# Patient Record
Sex: FEMALE | Race: BLACK OR AFRICAN AMERICAN | NOT HISPANIC OR LATINO | Employment: OTHER | URBAN - METROPOLITAN AREA
[De-identification: names, ages, dates, MRNs, and addresses within clinical notes are randomized per-mention and may not be internally consistent; named-entity substitution may affect disease eponyms.]

---

## 2017-10-26 ENCOUNTER — CONVERSION ENCOUNTER (OUTPATIENT)
Dept: GENERAL RADIOLOGY | Facility: HOSPITAL | Age: 67
End: 2017-10-26

## 2018-02-06 ENCOUNTER — OFFICE VISIT CONVERTED (OUTPATIENT)
Dept: INTERNAL MEDICINE | Facility: CLINIC | Age: 68
End: 2018-02-06
Attending: INTERNAL MEDICINE

## 2018-02-06 ENCOUNTER — CONVERSION ENCOUNTER (OUTPATIENT)
Dept: INTERNAL MEDICINE | Facility: CLINIC | Age: 68
End: 2018-02-06

## 2018-04-17 ENCOUNTER — OFFICE VISIT CONVERTED (OUTPATIENT)
Dept: GASTROENTEROLOGY | Facility: CLINIC | Age: 68
End: 2018-04-17
Attending: NURSE PRACTITIONER

## 2018-06-18 ENCOUNTER — CONVERSION ENCOUNTER (OUTPATIENT)
Dept: INTERNAL MEDICINE | Facility: CLINIC | Age: 68
End: 2018-06-18

## 2018-06-18 ENCOUNTER — OFFICE VISIT CONVERTED (OUTPATIENT)
Dept: INTERNAL MEDICINE | Facility: CLINIC | Age: 68
End: 2018-06-18
Attending: INTERNAL MEDICINE

## 2018-07-23 ENCOUNTER — OFFICE VISIT CONVERTED (OUTPATIENT)
Dept: GASTROENTEROLOGY | Facility: CLINIC | Age: 68
End: 2018-07-23
Attending: INTERNAL MEDICINE

## 2018-09-10 ENCOUNTER — OFFICE VISIT CONVERTED (OUTPATIENT)
Dept: INTERNAL MEDICINE | Facility: CLINIC | Age: 68
End: 2018-09-10
Attending: INTERNAL MEDICINE

## 2018-11-16 ENCOUNTER — CONVERSION ENCOUNTER (OUTPATIENT)
Dept: GENERAL RADIOLOGY | Facility: HOSPITAL | Age: 68
End: 2018-11-16

## 2018-12-10 ENCOUNTER — OFFICE VISIT CONVERTED (OUTPATIENT)
Dept: GASTROENTEROLOGY | Facility: CLINIC | Age: 68
End: 2018-12-10
Attending: INTERNAL MEDICINE

## 2019-01-01 ENCOUNTER — HOSPITAL ENCOUNTER (OUTPATIENT)
Dept: URGENT CARE | Facility: CLINIC | Age: 69
Discharge: HOME | End: 2019-01-01
Attending: FAMILY MEDICINE

## 2019-01-07 ENCOUNTER — CONVERSION ENCOUNTER (OUTPATIENT)
Dept: INTERNAL MEDICINE | Facility: CLINIC | Age: 69
End: 2019-01-07

## 2019-01-07 ENCOUNTER — OFFICE VISIT CONVERTED (OUTPATIENT)
Dept: INTERNAL MEDICINE | Facility: CLINIC | Age: 69
End: 2019-01-07
Attending: INTERNAL MEDICINE

## 2019-02-08 ENCOUNTER — HOSPITAL ENCOUNTER (OUTPATIENT)
Dept: URGENT CARE | Facility: CLINIC | Age: 69
Discharge: HOME | End: 2019-02-08

## 2019-02-12 ENCOUNTER — OFFICE VISIT CONVERTED (OUTPATIENT)
Dept: INTERNAL MEDICINE | Facility: CLINIC | Age: 69
End: 2019-02-12
Attending: PHYSICIAN ASSISTANT

## 2019-02-12 ENCOUNTER — CONVERSION ENCOUNTER (OUTPATIENT)
Dept: INTERNAL MEDICINE | Facility: CLINIC | Age: 69
End: 2019-02-12

## 2019-02-12 ENCOUNTER — HOSPITAL ENCOUNTER (OUTPATIENT)
Dept: OTHER | Facility: HOSPITAL | Age: 69
Discharge: HOME | End: 2019-02-12
Attending: PHYSICIAN ASSISTANT

## 2019-02-14 LAB — BACTERIA UR CULT: NORMAL

## 2019-03-18 ENCOUNTER — OFFICE VISIT CONVERTED (OUTPATIENT)
Dept: GASTROENTEROLOGY | Facility: CLINIC | Age: 69
End: 2019-03-18
Attending: NURSE PRACTITIONER

## 2019-03-20 ENCOUNTER — CONVERSION ENCOUNTER (OUTPATIENT)
Dept: INTERNAL MEDICINE | Facility: CLINIC | Age: 69
End: 2019-03-20

## 2019-03-22 ENCOUNTER — CONVERSION ENCOUNTER (OUTPATIENT)
Dept: INTERNAL MEDICINE | Facility: CLINIC | Age: 69
End: 2019-03-22

## 2019-04-17 ENCOUNTER — HOSPITAL ENCOUNTER (OUTPATIENT)
Dept: GASTROENTEROLOGY | Facility: HOSPITAL | Age: 69
Setting detail: HOSPITAL OUTPATIENT SURGERY
Discharge: HOME | End: 2019-04-17
Attending: INTERNAL MEDICINE

## 2019-04-26 ENCOUNTER — HOSPITAL ENCOUNTER (OUTPATIENT)
Dept: OTHER | Facility: HOSPITAL | Age: 69
Discharge: HOME | End: 2019-04-26
Attending: PHYSICIAN ASSISTANT

## 2019-04-26 ENCOUNTER — CONVERSION ENCOUNTER (OUTPATIENT)
Dept: INTERNAL MEDICINE | Facility: CLINIC | Age: 69
End: 2019-04-26

## 2019-04-26 ENCOUNTER — OFFICE VISIT CONVERTED (OUTPATIENT)
Dept: INTERNAL MEDICINE | Facility: CLINIC | Age: 69
End: 2019-04-26
Attending: PHYSICIAN ASSISTANT

## 2019-04-26 LAB
EST. AVERAGE GLUCOSE BLD GHB EST-MCNC: 120 MG/DL
HBA1C MFR BLD: 5.8 % (ref 3.5–5.7)
T4 FREE SERPL-MCNC: 1.2 NG/DL (ref 0.9–1.8)
TSH SERPL-ACNC: 1.46 M[IU]/L (ref 0.27–4.2)

## 2019-05-30 ENCOUNTER — HOSPITAL ENCOUNTER (OUTPATIENT)
Dept: NUCLEAR MEDICINE | Facility: HOSPITAL | Age: 69
Discharge: HOME | End: 2019-05-30
Attending: SPECIALIST

## 2019-08-20 ENCOUNTER — CONVERSION ENCOUNTER (OUTPATIENT)
Dept: OTHER | Facility: HOSPITAL | Age: 69
End: 2019-08-20

## 2019-08-20 ENCOUNTER — HOSPITAL ENCOUNTER (OUTPATIENT)
Dept: OTHER | Facility: HOSPITAL | Age: 69
Discharge: HOME | End: 2019-08-20
Attending: PHYSICIAN ASSISTANT

## 2019-08-20 ENCOUNTER — OFFICE VISIT CONVERTED (OUTPATIENT)
Dept: INTERNAL MEDICINE | Facility: CLINIC | Age: 69
End: 2019-08-20
Attending: PHYSICIAN ASSISTANT

## 2019-08-20 LAB
ALBUMIN SERPL-MCNC: 4.6 G/DL (ref 3.5–5)
ALBUMIN/GLOB SERPL: 1.4 {RATIO} (ref 1.4–2.6)
ALP SERPL-CCNC: 80 U/L (ref 43–160)
ALT SERPL-CCNC: 23 U/L (ref 10–40)
ANION GAP SERPL CALC-SCNC: 16 MMOL/L (ref 8–19)
AST SERPL-CCNC: 27 U/L (ref 15–50)
BASOPHILS # BLD AUTO: 0.06 10*3/UL (ref 0–0.2)
BASOPHILS NFR BLD AUTO: 1.1 % (ref 0–3)
BILIRUB SERPL-MCNC: 0.38 MG/DL (ref 0.2–1.3)
BUN SERPL-MCNC: 15 MG/DL (ref 5–25)
BUN/CREAT SERPL: 21 {RATIO} (ref 6–20)
CALCIUM SERPL-MCNC: 9.4 MG/DL (ref 8.7–10.4)
CHLORIDE SERPL-SCNC: 102 MMOL/L (ref 99–111)
CONV ABS IMM GRAN: 0.01 10*3/UL (ref 0–0.2)
CONV CO2: 28 MMOL/L (ref 22–32)
CONV IMMATURE GRAN: 0.2 % (ref 0–1.8)
CONV TOTAL PROTEIN: 7.9 G/DL (ref 6.3–8.2)
CREAT UR-MCNC: 0.72 MG/DL (ref 0.5–0.9)
DEPRECATED RDW RBC AUTO: 46.9 FL (ref 36.4–46.3)
EOSINOPHIL # BLD AUTO: 0.23 10*3/UL (ref 0–0.7)
EOSINOPHIL # BLD AUTO: 4.1 % (ref 0–7)
ERYTHROCYTE [DISTWIDTH] IN BLOOD BY AUTOMATED COUNT: 13.6 % (ref 11.7–14.4)
GFR SERPLBLD BASED ON 1.73 SQ M-ARVRAT: >60 ML/MIN/{1.73_M2}
GLOBULIN UR ELPH-MCNC: 3.3 G/DL (ref 2–3.5)
GLUCOSE SERPL-MCNC: 93 MG/DL (ref 65–99)
HCT VFR BLD AUTO: 41.3 % (ref 37–47)
HGB BLD-MCNC: 12.9 G/DL (ref 12–16)
LYMPHOCYTES # BLD AUTO: 2.9 10*3/UL (ref 1–5)
LYMPHOCYTES NFR BLD AUTO: 51.6 % (ref 20–45)
MAGNESIUM SERPL-MCNC: 2.1 MG/DL (ref 1.6–2.3)
MCH RBC QN AUTO: 28.9 PG (ref 27–31)
MCHC RBC AUTO-ENTMCNC: 31.2 G/DL (ref 33–37)
MCV RBC AUTO: 92.6 FL (ref 81–99)
MONOCYTES # BLD AUTO: 0.46 10*3/UL (ref 0.2–1.2)
MONOCYTES NFR BLD AUTO: 8.2 % (ref 3–10)
NEUTROPHILS # BLD AUTO: 1.96 10*3/UL (ref 2–8)
NEUTROPHILS NFR BLD AUTO: 34.8 % (ref 30–85)
NRBC CBCN: 0 % (ref 0–0.7)
OSMOLALITY SERPL CALC.SUM OF ELEC: 295 MOSM/KG (ref 273–304)
PLATELET # BLD AUTO: 251 10*3/UL (ref 130–400)
PMV BLD AUTO: 11.1 FL (ref 9.4–12.3)
POTASSIUM SERPL-SCNC: 3.9 MMOL/L (ref 3.5–5.3)
RBC # BLD AUTO: 4.46 10*6/UL (ref 4.2–5.4)
SODIUM SERPL-SCNC: 142 MMOL/L (ref 135–147)
TSH SERPL-ACNC: 1.36 M[IU]/L (ref 0.27–4.2)
WBC # BLD AUTO: 5.62 10*3/UL (ref 4.8–10.8)

## 2019-08-21 LAB
IRON SATN MFR SERPL: 27 % (ref 20–55)
IRON SERPL-MCNC: 107 UG/DL (ref 60–170)
TIBC SERPL-MCNC: 402 UG/DL (ref 245–450)
TRANSFERRIN SERPL-MCNC: 281 MG/DL (ref 250–380)

## 2019-09-18 ENCOUNTER — CONVERSION ENCOUNTER (OUTPATIENT)
Dept: GASTROENTEROLOGY | Facility: CLINIC | Age: 69
End: 2019-09-18

## 2019-09-18 ENCOUNTER — OFFICE VISIT CONVERTED (OUTPATIENT)
Dept: GASTROENTEROLOGY | Facility: CLINIC | Age: 69
End: 2019-09-18
Attending: NURSE PRACTITIONER

## 2019-10-15 ENCOUNTER — HOSPITAL ENCOUNTER (OUTPATIENT)
Dept: URGENT CARE | Facility: CLINIC | Age: 69
Discharge: HOME | End: 2019-10-15
Attending: EMERGENCY MEDICINE

## 2019-10-21 ENCOUNTER — OFFICE VISIT CONVERTED (OUTPATIENT)
Dept: INTERNAL MEDICINE | Facility: CLINIC | Age: 69
End: 2019-10-21
Attending: NURSE PRACTITIONER

## 2019-10-21 ENCOUNTER — CONVERSION ENCOUNTER (OUTPATIENT)
Dept: INTERNAL MEDICINE | Facility: CLINIC | Age: 69
End: 2019-10-21

## 2019-11-18 ENCOUNTER — HOSPITAL ENCOUNTER (OUTPATIENT)
Dept: GENERAL RADIOLOGY | Facility: HOSPITAL | Age: 69
Discharge: HOME | End: 2019-11-18
Attending: INTERNAL MEDICINE

## 2019-11-23 ENCOUNTER — HOSPITAL ENCOUNTER (OUTPATIENT)
Dept: URGENT CARE | Facility: CLINIC | Age: 69
Discharge: HOME | End: 2019-11-23
Attending: EMERGENCY MEDICINE

## 2020-01-13 ENCOUNTER — OFFICE VISIT CONVERTED (OUTPATIENT)
Dept: INTERNAL MEDICINE | Facility: CLINIC | Age: 70
End: 2020-01-13
Attending: INTERNAL MEDICINE

## 2020-01-14 ENCOUNTER — HOSPITAL ENCOUNTER (OUTPATIENT)
Dept: OTHER | Facility: HOSPITAL | Age: 70
Discharge: HOME | End: 2020-01-14
Attending: INTERNAL MEDICINE

## 2020-01-14 LAB
ALBUMIN SERPL-MCNC: 4.5 G/DL (ref 3.5–5)
ALBUMIN/GLOB SERPL: 1.3 {RATIO} (ref 1.4–2.6)
ALP SERPL-CCNC: 81 U/L (ref 43–160)
ALT SERPL-CCNC: 25 U/L (ref 10–40)
ANION GAP SERPL CALC-SCNC: 21 MMOL/L (ref 8–19)
AST SERPL-CCNC: 30 U/L (ref 15–50)
BASOPHILS # BLD AUTO: 0.05 10*3/UL (ref 0–0.2)
BASOPHILS NFR BLD AUTO: 0.8 % (ref 0–3)
BILIRUB SERPL-MCNC: 0.44 MG/DL (ref 0.2–1.3)
BUN SERPL-MCNC: 15 MG/DL (ref 5–25)
BUN/CREAT SERPL: 19 {RATIO} (ref 6–20)
CALCIUM SERPL-MCNC: 9.5 MG/DL (ref 8.7–10.4)
CHLORIDE SERPL-SCNC: 104 MMOL/L (ref 99–111)
CONV ABS IMM GRAN: 0.01 10*3/UL (ref 0–0.2)
CONV CO2: 25 MMOL/L (ref 22–32)
CONV IMMATURE GRAN: 0.2 % (ref 0–1.8)
CONV TOTAL PROTEIN: 7.9 G/DL (ref 6.3–8.2)
CREAT UR-MCNC: 0.81 MG/DL (ref 0.5–0.9)
DEPRECATED RDW RBC AUTO: 46.1 FL (ref 36.4–46.3)
EOSINOPHIL # BLD AUTO: 0.18 10*3/UL (ref 0–0.7)
EOSINOPHIL # BLD AUTO: 2.9 % (ref 0–7)
ERYTHROCYTE [DISTWIDTH] IN BLOOD BY AUTOMATED COUNT: 13.5 % (ref 11.7–14.4)
EST. AVERAGE GLUCOSE BLD GHB EST-MCNC: 123 MG/DL
GFR SERPLBLD BASED ON 1.73 SQ M-ARVRAT: >60 ML/MIN/{1.73_M2}
GLOBULIN UR ELPH-MCNC: 3.4 G/DL (ref 2–3.5)
GLUCOSE SERPL-MCNC: 106 MG/DL (ref 65–99)
HBA1C MFR BLD: 5.9 % (ref 3.5–5.7)
HCT VFR BLD AUTO: 42.1 % (ref 37–47)
HGB BLD-MCNC: 13.4 G/DL (ref 12–16)
LYMPHOCYTES # BLD AUTO: 3.63 10*3/UL (ref 1–5)
LYMPHOCYTES NFR BLD AUTO: 59 % (ref 20–45)
MCH RBC QN AUTO: 29.3 PG (ref 27–31)
MCHC RBC AUTO-ENTMCNC: 31.8 G/DL (ref 33–37)
MCV RBC AUTO: 92.1 FL (ref 81–99)
MONOCYTES # BLD AUTO: 0.44 10*3/UL (ref 0.2–1.2)
MONOCYTES NFR BLD AUTO: 7.2 % (ref 3–10)
NEUTROPHILS # BLD AUTO: 1.84 10*3/UL (ref 2–8)
NEUTROPHILS NFR BLD AUTO: 29.9 % (ref 30–85)
NRBC CBCN: 0 % (ref 0–0.7)
OSMOLALITY SERPL CALC.SUM OF ELEC: 301 MOSM/KG (ref 273–304)
PLATELET # BLD AUTO: 263 10*3/UL (ref 130–400)
PMV BLD AUTO: 10.6 FL (ref 9.4–12.3)
POTASSIUM SERPL-SCNC: 4.5 MMOL/L (ref 3.5–5.3)
RBC # BLD AUTO: 4.57 10*6/UL (ref 4.2–5.4)
SODIUM SERPL-SCNC: 145 MMOL/L (ref 135–147)
WBC # BLD AUTO: 6.15 10*3/UL (ref 4.8–10.8)

## 2020-01-17 ENCOUNTER — HOSPITAL ENCOUNTER (OUTPATIENT)
Dept: GENERAL RADIOLOGY | Facility: HOSPITAL | Age: 70
Discharge: HOME | End: 2020-01-17
Attending: INTERNAL MEDICINE

## 2020-02-07 ENCOUNTER — OFFICE VISIT CONVERTED (OUTPATIENT)
Dept: INTERNAL MEDICINE | Facility: CLINIC | Age: 70
End: 2020-02-07
Attending: INTERNAL MEDICINE

## 2020-04-07 ENCOUNTER — HOSPITAL ENCOUNTER (OUTPATIENT)
Dept: LAB | Facility: HOSPITAL | Age: 70
Discharge: HOME | End: 2020-04-07
Attending: SPECIALIST

## 2020-04-07 LAB
ALBUMIN SERPL-MCNC: 4.5 G/DL (ref 3.5–5)
ALBUMIN/GLOB SERPL: 1.5 {RATIO} (ref 1.4–2.6)
ALP SERPL-CCNC: 76 U/L (ref 43–160)
ALT SERPL-CCNC: 23 U/L (ref 10–40)
ANION GAP SERPL CALC-SCNC: 19 MMOL/L (ref 8–19)
AST SERPL-CCNC: 27 U/L (ref 15–50)
BILIRUB SERPL-MCNC: 0.41 MG/DL (ref 0.2–1.3)
BUN SERPL-MCNC: 12 MG/DL (ref 5–25)
BUN/CREAT SERPL: 13 {RATIO} (ref 6–20)
CALCIUM SERPL-MCNC: 9.5 MG/DL (ref 8.7–10.4)
CHLORIDE SERPL-SCNC: 105 MMOL/L (ref 99–111)
CHOLEST SERPL-MCNC: 185 MG/DL (ref 107–200)
CHOLEST/HDLC SERPL: 2.2 {RATIO} (ref 3–6)
CONV CO2: 24 MMOL/L (ref 22–32)
CONV TOTAL PROTEIN: 7.6 G/DL (ref 6.3–8.2)
CREAT UR-MCNC: 0.92 MG/DL (ref 0.5–0.9)
EST. AVERAGE GLUCOSE BLD GHB EST-MCNC: 123 MG/DL
GFR SERPLBLD BASED ON 1.73 SQ M-ARVRAT: >60 ML/MIN/{1.73_M2}
GLOBULIN UR ELPH-MCNC: 3.1 G/DL (ref 2–3.5)
GLUCOSE SERPL-MCNC: 96 MG/DL (ref 65–99)
HBA1C MFR BLD: 5.9 % (ref 3.5–5.7)
HDLC SERPL-MCNC: 83 MG/DL (ref 40–60)
LDLC SERPL CALC-MCNC: 89 MG/DL (ref 70–100)
OSMOLALITY SERPL CALC.SUM OF ELEC: 298 MOSM/KG (ref 273–304)
POTASSIUM SERPL-SCNC: 3.9 MMOL/L (ref 3.5–5.3)
SODIUM SERPL-SCNC: 144 MMOL/L (ref 135–147)
TRIGL SERPL-MCNC: 64 MG/DL (ref 40–150)
VLDLC SERPL-MCNC: 13 MG/DL (ref 5–37)

## 2020-05-13 ENCOUNTER — TELEPHONE CONVERTED (OUTPATIENT)
Dept: INTERNAL MEDICINE | Facility: CLINIC | Age: 70
End: 2020-05-13
Attending: PHYSICIAN ASSISTANT

## 2020-06-18 ENCOUNTER — OFFICE VISIT CONVERTED (OUTPATIENT)
Dept: INTERNAL MEDICINE | Facility: CLINIC | Age: 70
End: 2020-06-18
Attending: INTERNAL MEDICINE

## 2020-08-07 ENCOUNTER — HOSPITAL ENCOUNTER (OUTPATIENT)
Dept: LAB | Facility: HOSPITAL | Age: 70
Discharge: HOME | End: 2020-08-07
Attending: INTERNAL MEDICINE

## 2020-08-07 LAB
ALBUMIN SERPL-MCNC: 4.7 G/DL (ref 3.5–5)
ALBUMIN/GLOB SERPL: 1.4 {RATIO} (ref 1.4–2.6)
ALP SERPL-CCNC: 80 U/L (ref 43–160)
ALT SERPL-CCNC: 25 U/L (ref 10–40)
ANION GAP SERPL CALC-SCNC: 19 MMOL/L (ref 8–19)
APPEARANCE UR: CLEAR
AST SERPL-CCNC: 34 U/L (ref 15–50)
BASOPHILS # BLD AUTO: 0.05 10*3/UL (ref 0–0.2)
BASOPHILS NFR BLD AUTO: 1 % (ref 0–3)
BILIRUB SERPL-MCNC: 0.51 MG/DL (ref 0.2–1.3)
BILIRUB UR QL: NEGATIVE
BUN SERPL-MCNC: 10 MG/DL (ref 5–25)
BUN/CREAT SERPL: 10 {RATIO} (ref 6–20)
CALCIUM SERPL-MCNC: 10.2 MG/DL (ref 8.7–10.4)
CHLORIDE SERPL-SCNC: 101 MMOL/L (ref 99–111)
COLOR UR: YELLOW
CONV ABS IMM GRAN: 0.01 10*3/UL (ref 0–0.2)
CONV CO2: 26 MMOL/L (ref 22–32)
CONV COLLECTION SOURCE (UA): NORMAL
CONV CREATININE URINE, RANDOM: 189.9 MG/DL (ref 10–300)
CONV IMMATURE GRAN: 0.2 % (ref 0–1.8)
CONV MICROALBUM.,U,RANDOM: 15.8 MG/L (ref 0–20)
CONV TOTAL PROTEIN: 8 G/DL (ref 6.3–8.2)
CONV UROBILINOGEN IN URINE BY AUTOMATED TEST STRIP: 0.2 {EHRLICHU}/DL (ref 0.1–1)
CREAT UR-MCNC: 0.97 MG/DL (ref 0.5–0.9)
CRP SERPL-MCNC: 3.3 MG/L (ref 0–5)
DEPRECATED RDW RBC AUTO: 45 FL (ref 36.4–46.3)
EOSINOPHIL # BLD AUTO: 0.27 10*3/UL (ref 0–0.7)
EOSINOPHIL # BLD AUTO: 5.3 % (ref 0–7)
ERYTHROCYTE [DISTWIDTH] IN BLOOD BY AUTOMATED COUNT: 13.2 % (ref 11.7–14.4)
ERYTHROCYTE [SEDIMENTATION RATE] IN BLOOD: 8 MM/H (ref 0–30)
GFR SERPLBLD BASED ON 1.73 SQ M-ARVRAT: >60 ML/MIN/{1.73_M2}
GLOBULIN UR ELPH-MCNC: 3.3 G/DL (ref 2–3.5)
GLUCOSE SERPL-MCNC: 100 MG/DL (ref 65–99)
GLUCOSE UR QL: NEGATIVE MG/DL
HCT VFR BLD AUTO: 42.6 % (ref 37–47)
HGB BLD-MCNC: 13.6 G/DL (ref 12–16)
HGB UR QL STRIP: NEGATIVE
KETONES UR QL STRIP: NEGATIVE MG/DL
LEUKOCYTE ESTERASE UR QL STRIP: NEGATIVE
LYMPHOCYTES # BLD AUTO: 2.84 10*3/UL (ref 1–5)
LYMPHOCYTES NFR BLD AUTO: 55.7 % (ref 20–45)
MCH RBC QN AUTO: 29.2 PG (ref 27–31)
MCHC RBC AUTO-ENTMCNC: 31.9 G/DL (ref 33–37)
MCV RBC AUTO: 91.6 FL (ref 81–99)
MICROALBUMIN/CREAT UR: 8.3 MG/G{CRE} (ref 0–35)
MONOCYTES # BLD AUTO: 0.44 10*3/UL (ref 0.2–1.2)
MONOCYTES NFR BLD AUTO: 8.6 % (ref 3–10)
NEUTROPHILS # BLD AUTO: 1.49 10*3/UL (ref 2–8)
NEUTROPHILS NFR BLD AUTO: 29.2 % (ref 30–85)
NITRITE UR QL STRIP: NEGATIVE
NRBC CBCN: 0 % (ref 0–0.7)
OSMOLALITY SERPL CALC.SUM OF ELEC: 293 MOSM/KG (ref 273–304)
PH UR STRIP.AUTO: 7.5 [PH] (ref 5–8)
PLATELET # BLD AUTO: 247 10*3/UL (ref 130–400)
PMV BLD AUTO: 10.5 FL (ref 9.4–12.3)
POTASSIUM SERPL-SCNC: 3.8 MMOL/L (ref 3.5–5.3)
PROT UR QL: NEGATIVE MG/DL
PROT UR-MCNC: 14 MG/DL
RBC # BLD AUTO: 4.65 10*6/UL (ref 4.2–5.4)
SODIUM SERPL-SCNC: 142 MMOL/L (ref 135–147)
SP GR UR: 1.02 (ref 1–1.03)
WBC # BLD AUTO: 5.1 10*3/UL (ref 4.8–10.8)

## 2020-08-08 LAB
C3 SERPL-MCNC: 173 MG/DL (ref 82–167)
C4 SERPL-MCNC: 23 MG/DL (ref 14–44)
DSDNA AB SER-ACNC: 7 IU/ML (ref 0–9)

## 2020-09-03 ENCOUNTER — HOSPITAL ENCOUNTER (OUTPATIENT)
Dept: MAMMOGRAPHY | Facility: HOSPITAL | Age: 70
Discharge: HOME | End: 2020-09-03
Attending: INTERNAL MEDICINE

## 2020-10-22 ENCOUNTER — HOSPITAL ENCOUNTER (OUTPATIENT)
Dept: URGENT CARE | Facility: CLINIC | Age: 70
Discharge: HOME | End: 2020-10-22
Attending: PHYSICIAN ASSISTANT

## 2020-11-02 ENCOUNTER — HOSPITAL ENCOUNTER (OUTPATIENT)
Dept: URGENT CARE | Facility: CLINIC | Age: 70
Discharge: HOME | End: 2020-11-02

## 2021-01-22 ENCOUNTER — HOSPITAL ENCOUNTER (OUTPATIENT)
Dept: MAMMOGRAPHY | Facility: HOSPITAL | Age: 71
Discharge: HOME | End: 2021-01-22
Attending: INTERNAL MEDICINE

## 2021-02-24 ENCOUNTER — HOSPITAL ENCOUNTER (OUTPATIENT)
Dept: VACCINE CLINIC | Facility: HOSPITAL | Age: 71
Discharge: HOME | End: 2021-02-24
Attending: INTERNAL MEDICINE

## 2021-03-08 ENCOUNTER — HOSPITAL ENCOUNTER (OUTPATIENT)
Dept: URGENT CARE | Facility: CLINIC | Age: 71
Discharge: HOME | End: 2021-03-08
Attending: EMERGENCY MEDICINE

## 2021-03-09 ENCOUNTER — OFFICE VISIT CONVERTED (OUTPATIENT)
Dept: INTERNAL MEDICINE | Facility: CLINIC | Age: 71
End: 2021-03-09
Attending: PHYSICIAN ASSISTANT

## 2021-03-10 LAB — BACTERIA UR CULT: NORMAL

## 2021-03-19 ENCOUNTER — HOSPITAL ENCOUNTER (OUTPATIENT)
Dept: LAB | Facility: HOSPITAL | Age: 71
Discharge: HOME | End: 2021-03-19
Attending: SPECIALIST

## 2021-03-19 LAB
25(OH)D3 SERPL-MCNC: 59.1 NG/ML (ref 30–100)
ALBUMIN SERPL-MCNC: 4.4 G/DL (ref 3.5–5)
ALBUMIN/GLOB SERPL: 1.3 {RATIO} (ref 1.4–2.6)
ALP SERPL-CCNC: 78 U/L (ref 43–160)
ALT SERPL-CCNC: 33 U/L (ref 10–40)
ANION GAP SERPL CALC-SCNC: 16 MMOL/L (ref 8–19)
AST SERPL-CCNC: 42 U/L (ref 15–50)
BILIRUB SERPL-MCNC: 0.31 MG/DL (ref 0.2–1.3)
BUN SERPL-MCNC: 14 MG/DL (ref 5–25)
BUN/CREAT SERPL: 16 {RATIO} (ref 6–20)
CALCIUM SERPL-MCNC: 9.6 MG/DL (ref 8.7–10.4)
CHLORIDE SERPL-SCNC: 106 MMOL/L (ref 99–111)
CHOLEST SERPL-MCNC: 182 MG/DL (ref 107–200)
CHOLEST/HDLC SERPL: 2.2 {RATIO} (ref 3–6)
CONV CO2: 28 MMOL/L (ref 22–32)
CONV TOTAL PROTEIN: 7.9 G/DL (ref 6.3–8.2)
CREAT UR-MCNC: 0.9 MG/DL (ref 0.5–0.9)
EST. AVERAGE GLUCOSE BLD GHB EST-MCNC: 126 MG/DL
GFR SERPLBLD BASED ON 1.73 SQ M-ARVRAT: >60 ML/MIN/{1.73_M2}
GLOBULIN UR ELPH-MCNC: 3.5 G/DL (ref 2–3.5)
GLUCOSE SERPL-MCNC: 92 MG/DL (ref 65–99)
HBA1C MFR BLD: 6 % (ref 3.5–5.7)
HDLC SERPL-MCNC: 82 MG/DL (ref 40–60)
LDLC SERPL CALC-MCNC: 89 MG/DL (ref 70–100)
OSMOLALITY SERPL CALC.SUM OF ELEC: 302 MOSM/KG (ref 273–304)
POTASSIUM SERPL-SCNC: 4 MMOL/L (ref 3.5–5.3)
SODIUM SERPL-SCNC: 146 MMOL/L (ref 135–147)
TRIGL SERPL-MCNC: 55 MG/DL (ref 40–150)
VLDLC SERPL-MCNC: 11 MG/DL (ref 5–37)

## 2021-03-26 ENCOUNTER — HOSPITAL ENCOUNTER (OUTPATIENT)
Dept: VACCINE CLINIC | Facility: HOSPITAL | Age: 71
Discharge: HOME | End: 2021-03-26
Attending: INTERNAL MEDICINE

## 2021-03-29 ENCOUNTER — OFFICE VISIT CONVERTED (OUTPATIENT)
Dept: GASTROENTEROLOGY | Facility: CLINIC | Age: 71
End: 2021-03-29
Attending: NURSE PRACTITIONER

## 2021-05-10 NOTE — H&P
History and Physical      Patient Name: Yuliana Padilla   Patient ID: [id number]   Sex: Female   YOB: 1950    Primary Care Provider: Elicia Betts MD   Referring Provider: Elicia Betts MD    Visit Date: March 29, 2021    Provider: ABI Clark   Location: [facility]   Location Address: [address] [place] [address], [address]  [id number]   Location Phone: [phone]          Chief Complaint  · IBS      History Of Present Illness     She presents for f/u of IBS and chronic pancreatitis.  She is followed by Dr. Roberts for chronic pancreatitis.      States that she recently was evaluated in the ER for chest pain.  They felt that chest discomfort was related to GERD and r/o cardiac abnormality.  She was prescribed Prilosec for 14 days.  She reports that Prilosec was helpful.  Since she completed prescription, she's been taking TUMS nightly with evening meal.      States that pain is worse after lunch and/or dinner.   Denies dysphagia.      Reports a regular bowel pattern.  Denies hematochezia and melena.                 Past Medical History  Anemia; Asthma; Cataract; Cough variant asthma; Gastroesophageal Reflux Disorder; Headache; Hernia; Hypertension; Impaired fasting glucose; Insomnia; Leukocytopenia, unspecified; Lupus; Pancreatitis; Rhinitis, Allergic; Screening for breast cancer; Shortness of breath         Past Surgical History  Colonoscopy; EGD; Hysterectomy         Medication List  Advair -21 mcg/actuation inhalation HFA aerosol inhaler; albuterol sulfate 1.25 mg/3 mL inhalation solution for nebulization; amlodipine 5 mg oral tablet; cetirizine 10 mg oral tablet; Diflucan 150 mg oral tablet; hydrochlorothiazide 12.5 mg oral tablet; Levsin/SL 0.125 mg sublingual tablet, sublingual; Mucinex Fast-Max DM Max 5-100 mg/5 mL oral liquid; multivitamin oral tablet; Nasonex 50 mcg/actuation nasal spray,non-aerosol; Omega-3 350 mg-235 mg-  "90 mg-597 mg oral capsule,delayed release(DR/EC); omeprazole 20 mg Oral capsule,delayed release(DR/EC); Plaquenil 200 mg Oral tablet; Prilosec OTC 20 mg oral tablet,delayed release (DR/EC); Restasis 0.05 % ophthalmic dropperette; Toprol XL 25 mg oral tablet extended release 24 hr; Tylenol 325 mg Oral tablet; Ventolin HFA 90 mcg/actuation inhalation HFA aerosol inhaler; Vitamin D3 2,000 unit Oral capsule         Allergy List  Bactrim; Codeine Sulfate; Levaquin; SULFA (SULFONAMIDES); TETRACYCLINES       Allergies Reconciled  Family Medical History  Diabetes, unspecified type         Reproductive History   0 Para 0 0 0 0       Social History  Alcohol (Never); Tobacco (Never)         Immunizations  Name Date Admin   Hepatitis A 2018   Influenza 2019   Influenza 2017   Influenza 2013   Pneumococcal 2013   Prevnar 13 10/03/2019         Review of Systems  · Constitutional  o Denies  o : chills, fever  · Eyes  o Denies  o : blurred vision, changes in vision  · Cardiovascular  o Denies  o : chest pain, irregular heart beats  · Respiratory  o Denies  o : shortness of breath, cough  · Gastrointestinal  o Admits  o : See HPI  · Genitourinary  o Denies  o : dysuria, blood in urine  · Integument  o Denies  o : rash, new skin lesions  · Neurologic  o Denies  o : tingling or numbness  · Musculoskeletal  o Denies  o : joint pain, joint swelling  · Endocrine  o Denies  o : weight gain, weight loss  · Psychiatric  o Denies  o : anxiety, depression      Vitals  Date Time BP Position Site L\R Cuff Size HR RR TEMP (F) WT  HT  BMI kg/m2 BSA m2 O2 Sat FR L/min FiO2 HC       2021 01:50 /80 Sitting    83 - R 12  208lbs 0oz 5'  5\" 34.61 2.08             Physical Examination  · Constitutional  o Appearance  o : well developed, well-nourished, in no acute distress  · Eyes  o Vision  o :   § Visual Fields  § : eyes move symmetrical in all directions  o Sclerae  o : anicteric  o Pupils and " Irises  o : pupils equal and symmetrical  · Neck  o Inspection/Palpation  o : supple  · Respiratory  o Respiratory Effort  o : breathing unlabored  o Inspection of Chest  o : normal appearance, no retractions  o Auscultation of Lungs  o : clear to auscultation bilaterally  · Cardiovascular  o Heart  o :   § Auscultation of Heart  § : no murmurs, gallops or rubs  · Gastrointestinal  o Abdominal Examination  o : soft, nontender to palpation, with normal active bowel sounds, no appreciable hepatosplenomegaly  o Digital Rectal Exam  o : deferred  · Lymphatic  o Neck  o : no palpable lymphadenopathy  · Skin and Subcutaneous Tissue  o General Inspection  o : without focal lesions; turgor is normal  · Psychiatric  o General  o : Alert and oriented x3  o Mood and Affect  o : Mood and affect are appropriate to circumstances  · Extremities  o Extremities  o : No edema, no cyanosis          Assessment  · Pre-op testing     V72.84/Z01.818  · GERD (gastroesophageal reflux disease)     530.81/K21.9  · Atypical chest pain     786.59/R07.89      Plan  · Orders  o [facility] Pre-Op Covid-19 Screening (81156) - - 03/29/2021  o Consent for Esophagogastroduodenoscopy (EGD) - Possible risks/complications, benefits, and alternatives to surgical or invasive procedure have been explained to patient and/or legal guardian. - Patient has been evaluated and can tolerate anesthesia and/or sedation. Risks, benefits, and alternatives to anesthesia and sedation have been explained to patient and/or legal guardian. (74178) - - 03/29/2021  · Medications  o omeprazole 20 mg Oral capsule,delayed release(DR/EC)   SIG: take 1 capsule by oral route 2 times a day for 90 days   DISP: (180) Capsule with 1 refills  Refilled on 03/29/2021     o Medications have been Reconciled  · Instructions  o Handouts provided: Pre-procedure instructions including date and time and location of procedure.  o PLAN: Proceed with procedure. Patient understands risks and benefits and  is willing to proceed. Understands the risks include, but are not limited to, bleeding and/or perforation.  o Information given on current diagnoses.  o Electronically Identified Patient Education Materials Provided Electronically  · Disposition  o Follow Up after procedure            Electronically Signed by: ABI Clark -Author on March 29, 2021 04:18:48 PM

## 2021-05-13 NOTE — PROGRESS NOTES
Quick Note      Patient Name: Yuliana Padilla   Patient ID: [id number]   Sex: Female   YOB: 1950    Primary Care Provider: Elicia Betts MD   Referring Provider: Elicia Betts MD    Visit Date: May 13, 2020    Provider: Jenn Wilburn PA-C   Location: [facility]   Location Address: [address] [place] [address]  [id number]   Location Phone: [phone]          History Of Present Illness  TELEHEALTH TELEPHONE VISIT  Chief Complaint: CHIEF COMPLAINT   Yuliana Padilla is a 69 year old /Black female who is presenting for evaluation via telehealth telephone visit. Verbal consent obtained before beginning visit.   Provider spent 10 minutes with the patient during telehealth visit.   The following staff were present during this visit: Jenn Wilburn PA-C   Past Medical History/Overview of Patient Symptoms     9:06am- 9:16am    Swelling in left ankle x 6 months.  Not swollen every day per patient.  Some days she wakes up and swelling is worse.   Yesterday the swelling was non existent.   Denies pain with swelling. Denies redness, warmth, fever to area. Denies fever.   She states she notices a lump under the swelling, feels like a nodule when it swells up but it does not stay.  She does not take any medication when it is there because it is not painful.  She states it usually lasts 1-2 days then swelling resolves on its own.   She is able to move ankle normally, even if nodule is present.   She is able to stand and walk without issues.   Denies cp, palpitations, sob, unilateral weakness.   Denies swelling or numbness/tingling in toes.   Denies known injury.  She does not want any imaging or medications at this time.    She sees ortho for arthritis.       Physical Examination                Assessment  · Left ankle swelling     719.07/M25.472  Discussed ddx left ankle nodule and limited ability to do evaluation due to phone visit.  Encouraged pt to come to office for eval but she declines, she states it is not swollen right now and that she has been watching it x months. She is scheduled for a follow up with Dr. Betts next wk who will check on ankle. discussed XR but pt declined. Encouraged to come in or go to er if sx worsen, redness/warmth/fever to leg, pain develops. Pt understands and agrees.    Problems Reconciled  Plan  · Orders  o Physican Telephone evaluation, 5-10 min (45763) - - 05/13/2020  · Medications  o Medications have been Reconciled  o Transition of Care or Provider Policy  · Instructions  o Plan Of Care:   o Chronic conditions reviewed and taken into consideration for today's treatment plan.  o Patient instructed to seek medical attention urgently for new or worsening symptoms.  · Disposition  o Call or Return if symptoms worsen or persist.  o Keep follow up appt as scheduled            Electronically Signed by: Jenn Wilburn PA-C -Author on May 13, 2020 09:20:50 AM

## 2021-05-13 NOTE — PROGRESS NOTES
Progress Note      Patient Name: Yuliana Padilla   Patient ID: [id number]   Sex: Female   YOB: 1950    Primary Care Provider: Elicia Betts MD   Referring Provider: Elicia Betts MD    Visit Date: June 18, 2020    Provider: Elicia Betts MD   Location: [facility]   Location Address: [address] [place] [address]  [id number]   Location Phone: [phone]          Chief Complaint  · Annual Wellness Exam      History Of Present Illness  The patient is a 69 year old /Black female who is presenting for evaluation via video conferencing through Zoom for her Annual Wellness Visit. Verbal consent obtained before beginning visit.   The following staff were present during the visit: Dr Elicia Betts MD   Her Primary Care Provider is Elicia Betts MD. Her comprehensive Care Team list, including suppliers, has been updated on the Facesheet. Her medical/family history, height, weight, BMI, and blood pressure have been reviewed and are in the chart. The Health Risk Assessment has been completed and scanned in the chart.   Medications are listed in the medication list.   The active problem list includes: Anemia, Cough variant asthma, Gastroesophageal Reflux Disorder, Hypertension, Impaired fasting glucose, Insomnia, Lupus, and Rhinitis, Allergic   The patient does not have a history of substance use.   Patient reports her diet is adequate.   The Mini-Cog has been administered and is scanned in chart. The results are negative. Her cognitive function is without limitation.   A hearing loss screen was completed today and the result is negative.   Patient does not have any risk factors for depression. Patient completed the PHQ-9 today and it has been scanned in the chart. The total score is 0.   The GAIT SCREENING TOOL was administered today and the result is negative.   The Valdez Index of [place] in ADLs indicated full function (score of 6).    A Falls Risk Assessment has been completed, including a review of home fall hazards and medication review.   Overall, the patient's functional ability is noted by this provider to be within normal limits. Her level of safety is noted to be within normal limits. Her balance/gait is within normal limits. There have been no falls in the past year. Patient-specific home safety recommendations have been reviewed and a copy has been given to patient.   She denies issues with leaking urine.   There are no additional risk factors identified.   Living Will/Advanced Directive previously executed and scanned in chart.   Personalized health advice was given to the patient and a written health screening schedule was established; see Plan for details.       Past Medical History  Disease Name Date Onset Notes   Anemia --  --    Asthma --  Asthma w/Acute exacerbation   Cataract --  --    Cough variant asthma 06/09/2014 --    Gastroesophageal Reflux Disorder --  --    Headache --  --    Hernia --  Haital Hernia   Hypertension --  --    Impaired fasting glucose --  --    Insomnia --  --    Leukocytopenia, unspecified --  --    Lupus --  --    Pancreatitis --  Pancreatitis w/stent placement.   Rhinitis, Allergic 06/04/2013 --    Screening for breast cancer 11/2018 wnl   Shortness of breath --  --          Past Surgical History  Procedure Name Date Notes   Colonoscopy 2008 --    EGD 2004 --    Hysterectomy 1998 For Fibroids. Both Ovaries removed.         Medication List  Name Date Started Instructions   Advair -21 mcg/actuation inhalation HFA aerosol inhaler  inhale 2 puffs by inhalation route 2 times per day in the morning and evening   albuterol sulfate 1.25 mg/3 mL inhalation solution for nebulization  inhale 3 milliliters (1.25 mg) via nebulizer by inhalation route 4 times per day   amlodipine 5 mg oral tablet  take 1 tablet (5 mg) by oral route once daily   cetirizine 10 mg oral tablet 05/07/2020 take 1 tablet (10 mg) by  oral route once daily   hydrochlorothiazide 12.5 mg oral tablet  take 1 tablet (12.5 mg) by oral route once daily   Levsin/SL 0.125 mg sublingual tablet, sublingual 09/18/2019 place 1 tablet under tongue by translingual route Q6H PRN abdominal pain   Mucinex Fast-Max DM Max 5-100 mg/5 mL oral liquid 10/21/2019 take 30 milliliters by oral route every 8 hours   multivitamin oral tablet  take 1 tablet by oral route daily   Nasonex 50 mcg/actuation nasal spray,non-aerosol 06/09/2014 spray 2 sprays in each nostril by intranasal route once daily for 30 days   Omega-3 350 mg-235 mg- 90 mg-597 mg oral capsule,delayed release(DR/EC)  --    omeprazole 20 mg Oral capsule,delayed release(DR/EC)  take 1 capsule by oral route 2 times a day for 90 days   Plaquenil 200 mg Oral tablet  take 1 tablet (200 mg) by oral route once daily   Restasis 0.05 % ophthalmic dropperette  instill 1 drop into affected eye(s) by ophthalmic route 2 times per day   Toprol XL 25 mg oral tablet extended release 24 hr 10/15/2014 take 1 tablet (25 mg) by oral route once daily   Tylenol 325 mg Oral tablet 11/21/2013 take 1 - 2 tablets (325 - 650 mg) by oral route every 4-6 hours as needed for 30 days   Ventolin HFA 90 mcg/actuation inhalation HFA aerosol inhaler 02/17/2017 inhale 1 - 2 puffs (90 - 180 mcg) by inhalation route every 4 hours as needed   Vitamin D3 2,000 unit Oral capsule  take 1 gel capsule by oral route QD         Allergy List  Allergen Name Date Reaction Notes   Bactrim --  --  --    Codeine Sulfate --  --  --    Levaquin --  --  rash and diarrhea. pt reports tolerating cipro well.   SULFA (SULFONAMIDES) --  Diarrhea & Cramping --    TETRACYCLINES --  Hives --          Family Medical History  Disease Name Relative/Age Notes   Diabetes, unspecified type Father/   --          Reproductive History  Menstrual   Pregnancy Summary   Total Pregnancies: 0 Full Term: 0 Premature: 0   Ab Induced: 0 Ab Spontaneous: 0 Ectopics: 0   Multiples: 0  "Livin         Social History  Finding Status Start/Stop Quantity Notes   Alcohol Never --/-- --  --    Tobacco Never --/-- --  --          Immunizations  NameDate Admin Mfg Trade Name Lot Number Route Inj VIS Given VIS Publication   Hepatitis [id number] SKB HAVRIX-ADULT  IM RD 2018 10/25/2011   Comments: pt tolerated well and left in stable condition   Ghqadfkza53/30/2019 PMC FLUZONE-HIGH DOSE [id number] IM LD 2019    Comments: Pt tolerated well, left office in stable condition   Lffxsanvo24/26/2017 PMC FLUZONE-HIGH DOSE [id number] IM LD 2017   Comments: PT TOLERATED WELL, LEFT OFFICE IN STABLE CONDITION   Jeiqibpzp97/27/2013 NE Not Entered  IM LD 2013    Comments:    Qchziuvbhmib83/22/2013 MSD PNEUMOVAX 23  NE NE 2013    Comments: apothecare   Prevnar 131 WAL PREVNAR 13 [id number] IM LD 10/03/2019    Comments: pt tolerated well NDC# [id number]         Vitals  Date Time BP Position Site L\R Cuff Size HR RR TEMP (F) WT  HT  BMI kg/m2 BSA m2 O2 Sat HC       2020 04:17 /76 Sitting    78 - R  98.4 203lbs 0oz 5'  5\" 33.78 2.05 98 %    2020 03:40 /72 Sitting    76 - R 15 97.3 207lbs 0oz 5'  5\" 34.45 2.08 98 %    2020 02:16 /78 Sitting    88 - R  97.8 206lbs 2oz 5'  5\" 34.3 2.07 98 %          Physical Examination  · Head and Face  o Head  o :   § Inspection  § : atraumatic, normocephalic  · Eyes  o Eyes  o : extraocular movements intact, no scleral icterus, no conjunctival injection  · Ears, Nose, Mouth and Throat  o Ears  o :   § External Ears  § : normal  o Nose  o :   § Intranasal Exam  § : nares patent  o Oral Cavity  o :   § Oral Mucosa  § : moist mucous membranes  · Respiratory  o Respiratory Effort  o : breathing comfortably, symmetric chest rise  o Auscultation of Lungs  o : clear to asculatation bilaterally, no wheezes, rales, or rhonchii  · Cardiovascular  o Heart  o :   § Auscultation of Heart  § : regular rate and rhythm, " no murmurs, rubs, or gallops  o Peripheral Vascular System  o :   § Extremities  § : no edema  · Neurologic  o Mental Status Examination  o :   § Orientation  § : grossly oriented to person, place and time  o Gait and Station  o :   § Gait Screening  § : normal gait  · Psychiatric  o General  o : normal mood and affect          Assessment  · Encounter for Medicare annual wellness exam     V70.0/Z00.00    Problems Reconciled  Plan  · Orders  o Falls Risk Assessment Completed (3288F) - V70.0/Z00.00 - 06/18/2020  o Brief hearing screening (written) [facility] () - V70.0/Z00.00 - 06/18/2020  o Presence or absence of urinary incontinence assessed (ABBEY) (1090F) - V70.0/Z00.00 - 06/18/2020  o ACO-39: Current medications updated and reviewed () - - 06/18/2020  · Medications  o Medications have been Reconciled  o Transition of Care or Provider Policy  · Instructions  o Health Risk Assessment has been reviewed with the patient.  o Written health screening schedule for next 5-10 years was established with patient; information scanned in chart and given/mailed to patient.  o Fall prevention methods discussed and a copy of recommendations given/mailed to patient.            Electronically Signed by: Elicia Betts MD -Author on July 9, 2020 08:19:59 AM

## 2021-05-14 ENCOUNTER — HOSPITAL ENCOUNTER (OUTPATIENT)
Dept: URGENT CARE | Facility: CLINIC | Age: 71
Discharge: HOME | End: 2021-05-14
Attending: FAMILY MEDICINE

## 2021-05-14 VITALS
BODY MASS INDEX: 34.85 KG/M2 | OXYGEN SATURATION: 98 % | DIASTOLIC BLOOD PRESSURE: 80 MMHG | HEART RATE: 86 BPM | HEIGHT: 65 IN | SYSTOLIC BLOOD PRESSURE: 120 MMHG | TEMPERATURE: 97.9 F | WEIGHT: 209.19 LBS

## 2021-05-14 VITALS
DIASTOLIC BLOOD PRESSURE: 80 MMHG | SYSTOLIC BLOOD PRESSURE: 149 MMHG | BODY MASS INDEX: 34.66 KG/M2 | RESPIRATION RATE: 12 BRPM | HEIGHT: 65 IN | HEART RATE: 83 BPM | WEIGHT: 208 LBS

## 2021-05-14 NOTE — PROGRESS NOTES
"   Progress Note      Patient Name: Yuliana Padilla   Patient ID: [id number]   Sex: Female   YOB: 1950    Primary Care Provider: Elicia Betts MD   Referring Provider: Elciia Betts MD    Visit Date: March 9, 2021    Provider: Luna Dunlap PA-C   Location: [facility]   Location Address: [address] [place] [address], [facility]  [place], [place]  [id number]   Location Phone: [phone]          Chief Complaint  · Acute      History Of Present Illness  Yuliana Padilla is a 70 year old /Black female who presents for evaluation and treatment of:      vaginal discomfort, foul odor occasionally.  She has had associated low back pain occasionally.  Symptoms feel like a yeast infection per patient.  Some \"tingling\" when urinating.  No fevers.  Patient has been zpak and clindamycin for bronchitis.  She did have a yeast infection and took 1 diflucan but is concerned that it never went away.  She typically takes two diflucan previously when she has had problems in the past.       Past Medical History  Disease Name Date Onset Notes   Anemia --  --    Asthma --  Asthma w/Acute exacerbation   Cataract --  --    Cough variant asthma 06/09/2014 --    Gastroesophageal Reflux Disorder --  --    Headache --  --    Hernia --  Haital Hernia   Hypertension --  --    Impaired fasting glucose --  --    Insomnia --  --    Leukocytopenia, unspecified --  --    Lupus --  --    Pancreatitis --  Pancreatitis w/stent placement.   Rhinitis, Allergic 06/04/2013 --    Screening for breast cancer 11/2018 wnl   Shortness of breath --  --          Past Surgical History  Procedure Name Date Notes   Colonoscopy 2008 --    EGD 2004 --    Hysterectomy 1998 For Fibroids. Both Ovaries removed.         Medication List  Name Date Started Instructions   Advair -21 mcg/actuation inhalation HFA aerosol inhaler  inhale 2 puffs by inhalation route 2 times per day in the morning and evening "   albuterol sulfate 1.25 mg/3 mL inhalation solution for nebulization  inhale 3 milliliters (1.25 mg) via nebulizer by inhalation route 4 times per day   amlodipine 5 mg oral tablet  take 1 tablet (5 mg) by oral route once daily   cetirizine 10 mg oral tablet 12/28/2020 take 1 tablet (10 mg) by oral route once daily   hydrochlorothiazide 12.5 mg oral tablet  take 1 tablet (12.5 mg) by oral route once daily   Levsin/SL 0.125 mg sublingual tablet, sublingual 09/18/2019 place 1 tablet under tongue by translingual route Q6H PRN abdominal pain   Mucinex Fast-Max DM Max 5-100 mg/5 mL oral liquid 10/21/2019 take 30 milliliters by oral route every 8 hours   multivitamin oral tablet  take 1 tablet by oral route daily   Nasonex 50 mcg/actuation nasal spray,non-aerosol 06/09/2014 spray 2 sprays in each nostril by intranasal route once daily for 30 days   Omega-3 350 mg-235 mg- 90 mg-597 mg oral capsule,delayed release(DR/EC)  --    omeprazole 20 mg Oral capsule,delayed release(DR/EC) 03/29/2021 take 1 capsule by oral route 2 times a day for 90 days   Plaquenil 200 mg Oral tablet  take 1 tablet (200 mg) by oral route once daily   Prilosec OTC 20 mg oral tablet,delayed release (DR/EC)  take 1 tablet by oral route daily   Restasis 0.05 % ophthalmic dropperette  instill 1 drop into affected eye(s) by ophthalmic route 2 times per day   Toprol XL 25 mg oral tablet extended release 24 hr 10/15/2014 take 1 tablet (25 mg) by oral route once daily   Tylenol 325 mg Oral tablet 11/21/2013 take 1 - 2 tablets (325 - 650 mg) by oral route every 4-6 hours as needed for 30 days   Ventolin HFA 90 mcg/actuation inhalation HFA aerosol inhaler 02/17/2017 inhale 1 - 2 puffs (90 - 180 mcg) by inhalation route every 4 hours as needed   Vitamin D3 2,000 unit Oral capsule  take 1 gel capsule by oral route QD         Allergy List  Allergen Name Date Reaction Notes   Bactrim --  --  --    Codeine Sulfate --  --  --    Levaquin --  --  rash and diarrhea.  "pt reports tolerating cipro well.   SULFA (SULFONAMIDES) --  Diarrhea & Cramping --    TETRACYCLINES --  Hives --          Family Medical History  Disease Name Relative/Age Notes   Diabetes, unspecified type Father/   --          Reproductive History  Menstrual   Pregnancy Summary   Total Pregnancies: 0 Full Term: 0 Premature: 0   Ab Induced: 0 Ab Spontaneous: 0 Ectopics: 0   Multiples: 0 Livin         Social History  Finding Status Start/Stop Quantity Notes   Alcohol Never --/-- --  --    Tobacco Never --/-- --  --          Immunizations  NameDate Admin Mfg Trade Name Lot Number Route Inj VIS Given VIS Publication   Hepatitis [id number] SKB HAVRIX-ADULT  IM RD 2018 10/25/2011   Comments: pt tolerated well and left in stable condition   Chxbdpbzq80/30/2019 PMC FLUZONE-HIGH DOSE [id number] IM LD 2019    Comments: Pt tolerated well, left office in stable condition   Xvdfgdvxtzty56/22/2013 MSD PNEUMOVAX 23  NE NE 2013    Comments: [facility]othecare   Prevnar 131 WAL PREVNAR 13 [id number] IM LD 10/03/2019    Comments: pt tolerated well NDC# [id number]         Review of Systems  · Constitutional  o Denies  o : fever, fatigue, weight loss, weight gain  · Cardiovascular  o Denies  o : lower extremity edema, claudication, chest pressure, palpitations  · Respiratory  o Denies  o : shortness of breath, wheezing, cough, hemoptysis, dyspnea on exertion  · Gastrointestinal  o Denies  o : nausea, vomiting, diarrhea, constipation, abdominal pain      Vitals  Date Time BP Position Site L\R Cuff Size HR RR TEMP (F) WT  HT  BMI kg/m2 BSA m2 O2 Sat FR L/min FiO2 HC       2020 02:16 /78 Sitting    88 - R  97.8 206lbs 2oz 5'  5\" 34.3 2.07 98 %      2021 01:54 /80 Sitting    86 - R  97.9 209lbs 3oz 5'  5\" 34.81 2.09 98 %  21%          Physical Examination  · Constitutional  o Appearance  o : no acute distress, well-nourished  · Head and Face  o Head  o :   § Inspection  § : atraumatic, " normocephalic  · Eyes  o Eyes  o : extraocular movements intact, no scleral icterus, no conjunctival injection  · Ears, Nose, Mouth and Throat  o Ears  o :   § External Ears  § : normal  o Nose  o :   § Intranasal Exam  § : nares patent  o Oral Cavity  o :   § Oral Mucosa  § : moist mucous membranes  · Respiratory  o Respiratory Effort  o : breathing comfortably, symmetric chest rise  o Auscultation of Lungs  o : clear to asculatation bilaterally, no wheezes, rales, or rhonchii  · Cardiovascular  o Heart  o :   § Auscultation of Heart  § : regular rate and rhythm, no murmurs, rubs, or gallops  o Peripheral Vascular System  o :   § Extremities  § : no edema  · Skin and Subcutaneous Tissue  o General Inspection  o : no lesions present, no areas of discoloration, skin turgor normal  · Neurologic  o Mental Status Examination  o :   § Orientation  § : grossly oriented to person, place and time  o Gait and Station  o :   § Gait Screening  § : normal gait  · Psychiatric  o General  o : normal mood and affect      exam deferred per pt           Assessment  · Yeast infection     112.9/B37.9  Pt is very hesitant to start any antibiotics at this time as she has been on several recently due to bronchitis. Concern that symptoms are secondary to unresolved vaginal yeast infection. Will repeat dose of diflucan. If symptoms persist or worsen patient needs to return.       Plan  · Orders  o ACO-39: Current medications updated and reviewed (, 1159F) - - 03/09/2021  · Medications  o Diflucan 150 mg oral tablet   SIG: take 1 tablet (150 mg) by oral route once then repeat in 3 days   DISP: (2) Tablet with 0 refills  Prescribed on 03/09/2021     o Medications have been Reconciled  o Transition of Care or Provider Policy  · Instructions  o Take all medications as prescribed/directed.  o Patient was educated/instructed on their diagnosis, treatment and medications prior to discharge from the clinic today.  o Patient instructed to  seek medical attention urgently for new or worsening symptoms.  o Call the office with any concerns or questions.  · Disposition  o Call or Return if symptoms worsen or persist.  o follow up as needed  o Medications sent electronically to pharmacy            Electronically Signed by: Luna Dunlap PA-C -Author on April 1, 2021 09:28:25 PM

## 2021-05-15 VITALS
TEMPERATURE: 97.6 F | BODY MASS INDEX: 34.41 KG/M2 | OXYGEN SATURATION: 100 % | HEIGHT: 65 IN | HEART RATE: 80 BPM | RESPIRATION RATE: 15 BRPM | DIASTOLIC BLOOD PRESSURE: 64 MMHG | SYSTOLIC BLOOD PRESSURE: 110 MMHG | WEIGHT: 206.5 LBS

## 2021-05-15 VITALS
SYSTOLIC BLOOD PRESSURE: 130 MMHG | BODY MASS INDEX: 33.82 KG/M2 | HEIGHT: 65 IN | WEIGHT: 203 LBS | OXYGEN SATURATION: 98 % | TEMPERATURE: 98.4 F | DIASTOLIC BLOOD PRESSURE: 76 MMHG | HEART RATE: 78 BPM

## 2021-05-15 VITALS
HEIGHT: 65 IN | SYSTOLIC BLOOD PRESSURE: 139 MMHG | WEIGHT: 206 LBS | BODY MASS INDEX: 34.32 KG/M2 | DIASTOLIC BLOOD PRESSURE: 70 MMHG

## 2021-05-15 VITALS
DIASTOLIC BLOOD PRESSURE: 72 MMHG | OXYGEN SATURATION: 100 % | RESPIRATION RATE: 14 BRPM | SYSTOLIC BLOOD PRESSURE: 130 MMHG | BODY MASS INDEX: 34.49 KG/M2 | WEIGHT: 207 LBS | TEMPERATURE: 98.4 F | HEART RATE: 81 BPM | HEIGHT: 65 IN

## 2021-05-15 VITALS
BODY MASS INDEX: 34.34 KG/M2 | WEIGHT: 206.12 LBS | TEMPERATURE: 97.8 F | OXYGEN SATURATION: 98 % | SYSTOLIC BLOOD PRESSURE: 134 MMHG | HEART RATE: 88 BPM | DIASTOLIC BLOOD PRESSURE: 78 MMHG | HEIGHT: 65 IN

## 2021-05-15 VITALS
HEART RATE: 82 BPM | TEMPERATURE: 98.4 F | WEIGHT: 207.5 LBS | DIASTOLIC BLOOD PRESSURE: 72 MMHG | BODY MASS INDEX: 34.57 KG/M2 | OXYGEN SATURATION: 98 % | HEIGHT: 65 IN | SYSTOLIC BLOOD PRESSURE: 128 MMHG

## 2021-05-15 VITALS
WEIGHT: 205.5 LBS | SYSTOLIC BLOOD PRESSURE: 142 MMHG | HEIGHT: 65 IN | DIASTOLIC BLOOD PRESSURE: 93 MMHG | BODY MASS INDEX: 34.24 KG/M2

## 2021-05-15 VITALS
SYSTOLIC BLOOD PRESSURE: 134 MMHG | OXYGEN SATURATION: 98 % | BODY MASS INDEX: 34.49 KG/M2 | WEIGHT: 207 LBS | HEIGHT: 65 IN | TEMPERATURE: 97.3 F | HEART RATE: 76 BPM | RESPIRATION RATE: 15 BRPM | DIASTOLIC BLOOD PRESSURE: 72 MMHG

## 2021-05-15 VITALS — SYSTOLIC BLOOD PRESSURE: 152 MMHG | DIASTOLIC BLOOD PRESSURE: 85 MMHG

## 2021-05-15 VITALS — SYSTOLIC BLOOD PRESSURE: 142 MMHG | DIASTOLIC BLOOD PRESSURE: 82 MMHG | OXYGEN SATURATION: 100 % | HEART RATE: 80 BPM

## 2021-05-16 VITALS
TEMPERATURE: 98.2 F | SYSTOLIC BLOOD PRESSURE: 102 MMHG | RESPIRATION RATE: 14 BRPM | OXYGEN SATURATION: 98 % | DIASTOLIC BLOOD PRESSURE: 72 MMHG | WEIGHT: 200 LBS | HEART RATE: 86 BPM

## 2021-05-16 VITALS
SYSTOLIC BLOOD PRESSURE: 118 MMHG | WEIGHT: 202 LBS | RESPIRATION RATE: 12 BRPM | HEART RATE: 74 BPM | TEMPERATURE: 98.1 F | HEIGHT: 65 IN | OXYGEN SATURATION: 100 % | DIASTOLIC BLOOD PRESSURE: 68 MMHG | BODY MASS INDEX: 33.66 KG/M2

## 2021-05-16 VITALS
TEMPERATURE: 98.4 F | DIASTOLIC BLOOD PRESSURE: 64 MMHG | HEIGHT: 65 IN | OXYGEN SATURATION: 100 % | RESPIRATION RATE: 16 BRPM | SYSTOLIC BLOOD PRESSURE: 113 MMHG | HEART RATE: 66 BPM | BODY MASS INDEX: 33.82 KG/M2 | WEIGHT: 203 LBS

## 2021-05-16 VITALS
SYSTOLIC BLOOD PRESSURE: 134 MMHG | DIASTOLIC BLOOD PRESSURE: 82 MMHG | OXYGEN SATURATION: 100 % | RESPIRATION RATE: 16 BRPM | HEIGHT: 65 IN | WEIGHT: 207.25 LBS | HEART RATE: 90 BPM | TEMPERATURE: 97 F | BODY MASS INDEX: 34.53 KG/M2

## 2021-05-16 VITALS
HEIGHT: 65 IN | BODY MASS INDEX: 32.72 KG/M2 | SYSTOLIC BLOOD PRESSURE: 138 MMHG | DIASTOLIC BLOOD PRESSURE: 67 MMHG | WEIGHT: 196.37 LBS

## 2021-05-16 VITALS
BODY MASS INDEX: 33.74 KG/M2 | HEIGHT: 65 IN | SYSTOLIC BLOOD PRESSURE: 142 MMHG | WEIGHT: 202.5 LBS | DIASTOLIC BLOOD PRESSURE: 82 MMHG

## 2021-05-16 VITALS
TEMPERATURE: 97.1 F | SYSTOLIC BLOOD PRESSURE: 112 MMHG | WEIGHT: 198.25 LBS | HEIGHT: 65 IN | OXYGEN SATURATION: 100 % | HEART RATE: 73 BPM | BODY MASS INDEX: 33.03 KG/M2 | RESPIRATION RATE: 18 BRPM | DIASTOLIC BLOOD PRESSURE: 72 MMHG

## 2021-05-16 VITALS
DIASTOLIC BLOOD PRESSURE: 66 MMHG | BODY MASS INDEX: 33.34 KG/M2 | SYSTOLIC BLOOD PRESSURE: 154 MMHG | WEIGHT: 200.12 LBS | HEIGHT: 65 IN

## 2021-05-22 ENCOUNTER — TRANSCRIBE ORDERS (OUTPATIENT)
Dept: LAB | Facility: HOSPITAL | Age: 71
End: 2021-05-22

## 2021-05-22 DIAGNOSIS — Z01.818 PRE-OP TESTING: Primary | ICD-10-CM

## 2021-05-27 ENCOUNTER — OFFICE VISIT CONVERTED (OUTPATIENT)
Dept: CARDIOLOGY | Facility: CLINIC | Age: 71
End: 2021-05-27
Attending: SPECIALIST

## 2021-05-28 ENCOUNTER — TRANSCRIBE ORDERS (OUTPATIENT)
Dept: CARDIOLOGY | Facility: CLINIC | Age: 71
End: 2021-05-28

## 2021-05-28 DIAGNOSIS — R06.02 SOB (SHORTNESS OF BREATH): Primary | ICD-10-CM

## 2021-06-05 NOTE — H&P
History and Physical      Patient Name: Yuliana Padilla   Patient ID: [id number]   Sex: Female   YOB: 1950    Primary Care Provider: Elicia Betts MD   Referring Provider: Elicia Betts MD    Visit Date: May 27, 2021    Provider: Toby Lin MD   Location: [facility]   Location Address: [address] [place] [address], [facility]   [place], [place]  [id number]   Location Phone: [phone]          Chief Complaint     Palpitations and chest pain.       History Of Present Illness  Consult requested by: Elicia Betts MD   Yuliana Padilla is a 70 year old /Black female that has seen Dr. Paige before for palpitations. She has had palpitations on and off for the last several years. Palpitations are substernal, last for a few minutes and are relieved spontaneously. She still has palpitations at least once a week. She also has had chest pain, substernal, aching, not exertional, relieved spontaneously. She went to the emergency room, was evaluated and discharged. Cardiac risk factors: History of hypertension is positive. No history of diabetes mellitus. Not a smoker. No history of hyperlipidemia. Family history is negative.   PAST MEDICAL HISTORY: Palpitations; Hypertension; GERD; Arthritis; Lupus; Asthma.   FAMILY HISTORY: Positive for diabetes and hypertension.   PSYCHOSOCIAL HISTORY: Denies alcohol or tobacco use. Moderate caffeine intake.   CURRENT MEDICATIONS: Prilosec 20 mg daily; Symbicort as needed; Nasonex as needed; Aspirin 81 mg daily; Vitamin D 2000 units daily; Omega 3-6-9 Complex daily; Albuterol; Toprol XL 25 mg daily; Hydrochlorothiazide 12.5 mg daily; Amlodipine Besylate 2.5 mg daily.   ALLERGIES: Tetracycline; Bactrim; Levaquin.       Review of Systems  · Constitutional  o Admits  o : fatigue, good general health lately  o Denies  o : recent weight changes   · Eyes  o Admits  o : double vision  · HENT  o Admits  o : swollen glands in neck  o Denies  o :  "hearing loss or ringing, chronic sinus problem  · Cardiovascular  o Admits  o : chest pain, palpitations (fast, fluttering, or skipping beats), shortness of breath while walking or lying flat  o Denies  o : swelling (feet, ankles, hands)  · Respiratory  o Admits  o : chronic or frequent cough, asthma or wheezing  o Denies  o : COPD  · Gastrointestinal  o Denies  o : ulcers, nausea or vomiting  · Neurologic  o Denies  o : lightheaded or dizzy, stroke, headaches  · Musculoskeletal  o Admits  o : joint pain, back pain  · Endocrine  o Denies  o : thyroid disease, diabetes, heat or cold intolerance, excessive thirst or urination  · Heme-Lymph  o Denies  o : bleeding or bruising tendency, anemia      Vitals  Date Time BP Position Site L\R Cuff Size HR RR TEMP (F) WT  HT  BMI kg/m2 BSA m2 O2 Sat FR L/min FiO2 HC       05/27/2021 12:15 /62 Sitting    78 - R   208lbs 0oz 5'  5\" 34.61 2.08       05/27/2021 12:15 /76 Sitting    88 - R                   Physical Examination  · Constitutional  o Appearance  o : Awake, alert, cooperative, pleasant.  · Eyes  o Pupils and Irises  o : Pupils equal and reacting to light and accommodation.  · Ears, Nose, Mouth and Throat  o Ears  o : Tympanic membranes are normal.  o Nose  o : Clear with no maxillary tenderness.  o Oral Cavity  o : Clear.  · Neck  o Inspection/Palpation  o : No JVD, bruits, thyromegaly, or adenopathy. Trachea midline. No axillary or cervical lymphadenopathy.  o Thyroid  o : No thyroid enlargement.   · Respiratory  o Inspection of Chest  o : No chest wall deformities, moving equal.  o Auscultation of Lungs  o : Good air entry with vesicular breath sounds.  o Percussion of Chest  o : Resonant bilaterally.   o Palpation of Chest  o : Equal movements bilaterally.  · Cardiovascular  o Heart  o :   § Auscultation of Heart  § : PMI is in the 5th intercostal space. S1 and S2 regular. No S3. No S4. No murmurs.  o Peripheral Vascular System  o :   § Extremities  § : " No pedal edema. Peripheral pulses well felt. No cyanosis.  · Gastrointestinal  o Abdominal Examination  o : No organomegaly, masses, tenderness, bruits, or abnormal pulsations. Bowel sounds are normal.  · Musculoskeletal  o General  o : Muscle strength is normal with normal tone.  · Skin and Subcutaneous Tissue  o General Inspection  o : No rash, petechiae, or suspicious skin lesions. Skin turgor is normal.  · Data  o Data  o : I reviewed her reports from the emergency room including EKG. EKG showed sinus tachycardia.           Assessment     ASSESSMENT & PLAN:     1.  Precordial atypical chest pain with positive risk factors.  We will do a sestamibi stress test to rule out any        significant ischemia.   2.  Palpitations/sinus tachycardia.  Repeat 24-hour Holter and echocardiogram.  Continue metoprolol ER 25 mg        for now.  Be on a low caffeine diet.   3.  See me back in 3 months.      Toby Lin MD, [facility]  MERLE/rt                Electronically Signed by: Monse Dean-, Other -Author on May 30, 2021 05:21:19 PM  Electronically Co-signed by: Toby Lin MD -Reviewer on June 2, 2021 12:40:37 PM

## 2021-06-07 ENCOUNTER — APPOINTMENT (OUTPATIENT)
Dept: NUCLEAR MEDICINE | Facility: HOSPITAL | Age: 71
End: 2021-06-07

## 2021-06-21 RX ORDER — BLACK COHOSH ROOT EXTRACT 80 MG
CAPSULE ORAL DAILY
COMMUNITY
End: 2021-06-22 | Stop reason: HOSPADM

## 2021-06-21 RX ORDER — HYDROCHLOROTHIAZIDE 12.5 MG/1
TABLET ORAL
COMMUNITY
End: 2021-06-22 | Stop reason: HOSPADM

## 2021-06-21 RX ORDER — METOPROLOL SUCCINATE 25 MG/1
25 TABLET, EXTENDED RELEASE ORAL DAILY
COMMUNITY
End: 2021-06-22 | Stop reason: HOSPADM

## 2021-06-21 RX ORDER — AMLODIPINE BESYLATE 5 MG/1
0.5 TABLET ORAL DAILY
COMMUNITY
End: 2021-06-22 | Stop reason: HOSPADM

## 2021-06-21 RX ORDER — ASPIRIN 81 MG/1
81 TABLET ORAL DAILY
COMMUNITY
End: 2021-06-22 | Stop reason: HOSPADM

## 2021-06-21 RX ORDER — MOMETASONE FUROATE 50 UG/1
2 SPRAY, METERED NASAL AS NEEDED
COMMUNITY
End: 2021-06-22 | Stop reason: HOSPADM

## 2021-06-21 RX ORDER — ALBUTEROL SULFATE 1.25 MG/3ML
3 SOLUTION RESPIRATORY (INHALATION)
COMMUNITY
End: 2021-06-22 | Stop reason: HOSPADM

## 2021-06-21 RX ORDER — OMEPRAZOLE 20 MG/1
TABLET, DELAYED RELEASE ORAL
COMMUNITY
End: 2021-06-22 | Stop reason: HOSPADM

## 2021-06-23 ENCOUNTER — APPOINTMENT (OUTPATIENT)
Dept: NUCLEAR MEDICINE | Facility: HOSPITAL | Age: 71
End: 2021-06-23

## 2021-06-23 ENCOUNTER — HOSPITAL ENCOUNTER (OUTPATIENT)
Dept: NUCLEAR MEDICINE | Facility: HOSPITAL | Age: 71
End: 2021-06-23

## 2021-07-01 RX ORDER — MOMETASONE FUROATE 50 UG/1
2 SPRAY, METERED NASAL AS NEEDED
COMMUNITY

## 2021-07-01 RX ORDER — METOPROLOL SUCCINATE 25 MG/1
25 TABLET, EXTENDED RELEASE ORAL DAILY
COMMUNITY

## 2021-07-01 RX ORDER — HYDROCHLOROTHIAZIDE 12.5 MG/1
12.5 TABLET ORAL DAILY
COMMUNITY
End: 2023-01-31 | Stop reason: SDUPTHER

## 2021-07-01 RX ORDER — AMLODIPINE BESYLATE 5 MG/1
5 TABLET ORAL DAILY
COMMUNITY
End: 2021-10-15 | Stop reason: SDUPTHER

## 2021-07-01 RX ORDER — CHOLECALCIFEROL (VITAMIN D3) 50 MCG
2000 TABLET ORAL WEEKLY
COMMUNITY

## 2021-07-01 RX ORDER — OMEPRAZOLE 20 MG/1
20 CAPSULE, DELAYED RELEASE ORAL DAILY
COMMUNITY
End: 2021-09-23

## 2021-07-01 RX ORDER — ASPIRIN 81 MG/1
81 TABLET ORAL DAILY
COMMUNITY
End: 2021-10-20

## 2021-07-02 ENCOUNTER — HOSPITAL ENCOUNTER (OUTPATIENT)
Dept: NUCLEAR MEDICINE | Facility: HOSPITAL | Age: 71
Discharge: HOME | End: 2021-07-02

## 2021-07-02 VITALS — BODY MASS INDEX: 32.49 KG/M2 | HEIGHT: 65 IN | WEIGHT: 195 LBS

## 2021-07-02 DIAGNOSIS — R06.02 SOB (SHORTNESS OF BREATH): ICD-10-CM

## 2021-07-02 LAB
BH CV IMMEDIATE POST RECOVERY TECH DATA SYMPTOMS: NORMAL
BH CV IMMEDIATE POST TECH DATA BLOOD PRESSURE: NORMAL MMHG
BH CV IMMEDIATE POST TECH DATA HEART RATE: 108 BPM
BH CV IMMEDIATE POST TECH DATA OXYGEN SATS: 100 %
BH CV REST NUCLEAR ISOTOPE DOSE: 10 MCI
BH CV SIX MINUTE RECOVERY TECH DATA BLOOD PRESSURE: NORMAL
BH CV SIX MINUTE RECOVERY TECH DATA HEART RATE: 96 BPM
BH CV SIX MINUTE RECOVERY TECH DATA OXYGEN SATURATION: 99 %
BH CV SIX MINUTE RECOVERY TECH DATA SYMPTOMS: NORMAL
BH CV STRESS BP STAGE 1: NORMAL
BH CV STRESS BP STAGE 2: NORMAL
BH CV STRESS DURATION MIN STAGE 1: 3
BH CV STRESS DURATION SEC STAGE 1: 0
BH CV STRESS DURATION SEC STAGE 2: 52
BH CV STRESS GRADE STAGE 1: 10
BH CV STRESS GRADE STAGE 2: 12
BH CV STRESS HR STAGE 1: 125
BH CV STRESS HR STAGE 2: 125
BH CV STRESS METS STAGE 1: 5
BH CV STRESS METS STAGE 2: 7.5
BH CV STRESS NUCLEAR ISOTOPE DOSE: 37.8 MCI
BH CV STRESS O2 STAGE 1: 94
BH CV STRESS O2 STAGE 2: 94
BH CV STRESS PROTOCOL 1: NORMAL
BH CV STRESS PROTOCOL 2 BP STAGE 1: NORMAL
BH CV STRESS PROTOCOL 2 COMMENTS STAGE 1: NORMAL
BH CV STRESS PROTOCOL 2 DOSE REGADENOSON STAGE 1: 0.4
BH CV STRESS PROTOCOL 2 DURATION MIN STAGE 1: 0
BH CV STRESS PROTOCOL 2 DURATION SEC STAGE 1: 10
BH CV STRESS PROTOCOL 2 HR STAGE 1: 86
BH CV STRESS PROTOCOL 2 O2 STAGE 1: 99
BH CV STRESS PROTOCOL 2 STAGE 1: 1
BH CV STRESS PROTOCOL 2: NORMAL
BH CV STRESS RECOVERY BP: NORMAL MMHG
BH CV STRESS RECOVERY HR: 97 BPM
BH CV STRESS RECOVERY O2: 99 %
BH CV STRESS SPEED STAGE 1: 1.7
BH CV STRESS SPEED STAGE 2: 2.5
BH CV STRESS STAGE 1: 1
BH CV STRESS STAGE 2: 2
BH CV THREE MINUTE POST TECH DATA BLOOD PRESSURE: NORMAL MMHG
BH CV THREE MINUTE POST TECH DATA HEART RATE: 103 BPM
BH CV THREE MINUTE POST TECH DATA OXYGEN SATURATION: 99 %
LV EF NUC BP: 67 %
MAXIMAL PREDICTED HEART RATE: 149 BPM
PERCENT MAX PREDICTED HR: 76.51 %
STRESS BASELINE BP: NORMAL MMHG
STRESS BASELINE HR: 75 BPM
STRESS O2 SAT REST: 95 %
STRESS PERCENT HR: 90 %
STRESS POST ESTIMATED WORKLOAD: 4.6 METS
STRESS POST EXERCISE DUR MIN: 3 MIN
STRESS POST EXERCISE DUR SEC: 52 SEC
STRESS POST O2 SAT PEAK: 98 %
STRESS POST PEAK BP: NORMAL MMHG
STRESS POST PEAK HR: 114 BPM
STRESS TARGET HR: 127 BPM

## 2021-07-02 PROCEDURE — A9502 TC99M TETROFOSMIN: HCPCS | Performed by: SPECIALIST

## 2021-07-02 PROCEDURE — 93016 CV STRESS TEST SUPVJ ONLY: CPT | Performed by: NURSE PRACTITIONER

## 2021-07-02 PROCEDURE — 78452 HT MUSCLE IMAGE SPECT MULT: CPT | Performed by: SPECIALIST

## 2021-07-02 PROCEDURE — 0 TECHNETIUM TETROFOSMIN KIT: Performed by: SPECIALIST

## 2021-07-02 PROCEDURE — 93018 CV STRESS TEST I&R ONLY: CPT | Performed by: SPECIALIST

## 2021-07-02 PROCEDURE — 93017 CV STRESS TEST TRACING ONLY: CPT

## 2021-07-02 PROCEDURE — 78452 HT MUSCLE IMAGE SPECT MULT: CPT

## 2021-07-02 PROCEDURE — 25010000002 REGADENOSON 0.4 MG/5ML SOLUTION

## 2021-07-02 RX ADMIN — REGADENOSON 0.4 MG: 0.08 INJECTION, SOLUTION INTRAVENOUS at 11:29

## 2021-07-02 RX ADMIN — TETROFOSMIN 1 DOSE: 1.38 INJECTION, POWDER, LYOPHILIZED, FOR SOLUTION INTRAVENOUS at 10:01

## 2021-07-02 RX ADMIN — TETROFOSMIN 1 DOSE: 1.38 INJECTION, POWDER, LYOPHILIZED, FOR SOLUTION INTRAVENOUS at 11:28

## 2021-07-06 ENCOUNTER — TELEPHONE (OUTPATIENT)
Dept: CARDIOLOGY | Facility: CLINIC | Age: 71
End: 2021-07-06

## 2021-07-06 NOTE — TELEPHONE ENCOUNTER
----- Message from Velvet Denny RN sent at 7/2/2021  4:43 PM EDT -----  This one needs and appointment also.  I am off Tuesday, so please have the front schedule and you can call the patient.    Thanks!  ----- Message -----  From: Haven Garcia APRN  Sent: 7/2/2021   2:51 PM EDT  To: Velvet Denny RN    Notify patient stress test results: Myocardial perfusion imaging indicates a normal myocardial perfusion study with no evidence of ischemia. Needs follow up in 3 months with Dr Lin

## 2021-07-15 VITALS
SYSTOLIC BLOOD PRESSURE: 146 MMHG | BODY MASS INDEX: 34.66 KG/M2 | HEIGHT: 65 IN | DIASTOLIC BLOOD PRESSURE: 62 MMHG | WEIGHT: 208 LBS | HEART RATE: 78 BPM

## 2021-08-26 ENCOUNTER — TELEPHONE (OUTPATIENT)
Dept: INTERNAL MEDICINE | Facility: CLINIC | Age: 71
End: 2021-08-26

## 2021-08-26 NOTE — TELEPHONE ENCOUNTER
HUB UNABLE TO WARM TRANSFER    Caller: Yuliana Da Silva    Relationship: Self    Best call back number: [phone]     What is the best time to reach you: ANY    Who are you requesting to speak with (clinical staff, provider,  specific staff member): PROVIDER    What was the call regarding: PATIENT HIGHLY UPSET BECAUSE HER APPOINTMENT WAS CHANGED FROM DR. ABERNATHY TO [facility]. SHE STATES SHE CAN NEVER SEE HER DOCTOR ANYMORE, IT'S ALWAYS A PA. IF SHE IS NOT ABLE TO SEE DR. ABERNATHY SHE WILL TRY AND FIND A NEW DOCTOR. PATIENT WANTS TO KNOW WHY SHE NEVER GETS TO SEE HER DOCTOR, SHE WANTS TO KNOW WHAT'S GOING ON.    Do you require a callback: YES, PLEASE CALL AND ADVISE

## 2021-08-26 NOTE — TELEPHONE ENCOUNTER
Called and discussed with patient that we have some providers seeing sick patients and some seeing well and that is why we switched her at the last minute. She stated understanding. No other issues or concerns noted currently per patient.

## 2021-08-27 ENCOUNTER — OFFICE VISIT (OUTPATIENT)
Dept: INTERNAL MEDICINE | Facility: CLINIC | Age: 71
End: 2021-08-27

## 2021-08-27 VITALS
HEART RATE: 75 BPM | BODY MASS INDEX: 34.66 KG/M2 | OXYGEN SATURATION: 100 % | DIASTOLIC BLOOD PRESSURE: 82 MMHG | TEMPERATURE: 97.9 F | WEIGHT: 208 LBS | SYSTOLIC BLOOD PRESSURE: 138 MMHG | HEIGHT: 65 IN

## 2021-08-27 DIAGNOSIS — R63.5 WEIGHT GAIN: ICD-10-CM

## 2021-08-27 DIAGNOSIS — I10 ESSENTIAL HYPERTENSION: ICD-10-CM

## 2021-08-27 DIAGNOSIS — D72.824 BASOPHILIA: ICD-10-CM

## 2021-08-27 DIAGNOSIS — G47.00 INSOMNIA, UNSPECIFIED TYPE: ICD-10-CM

## 2021-08-27 DIAGNOSIS — M25.572 ACUTE LEFT ANKLE PAIN: Primary | ICD-10-CM

## 2021-08-27 DIAGNOSIS — R73.01 IMPAIRED FASTING GLUCOSE: ICD-10-CM

## 2021-08-27 LAB
ALBUMIN SERPL-MCNC: 4.6 G/DL (ref 3.5–5.2)
ALBUMIN/GLOB SERPL: 1.5 G/DL
ALP SERPL-CCNC: 82 U/L (ref 39–117)
ALT SERPL W P-5'-P-CCNC: 23 U/L (ref 1–33)
ANION GAP SERPL CALCULATED.3IONS-SCNC: 10.3 MMOL/L (ref 5–15)
AST SERPL-CCNC: 26 U/L (ref 1–32)
BASOPHILS # BLD MANUAL: 0.11 10*3/MM3 (ref 0–0.2)
BASOPHILS NFR BLD AUTO: 2 % (ref 0–1.5)
BILIRUB SERPL-MCNC: 0.2 MG/DL (ref 0–1.2)
BUN SERPL-MCNC: 13 MG/DL (ref 8–23)
BUN/CREAT SERPL: 16.7 (ref 7–25)
CALCIUM SPEC-SCNC: 9.5 MG/DL (ref 8.6–10.5)
CHLORIDE SERPL-SCNC: 102 MMOL/L (ref 98–107)
CHOLEST SERPL-MCNC: 191 MG/DL (ref 0–200)
CO2 SERPL-SCNC: 27.7 MMOL/L (ref 22–29)
CREAT SERPL-MCNC: 0.78 MG/DL (ref 0.57–1)
DEPRECATED RDW RBC AUTO: 41.5 FL (ref 37–54)
EOSINOPHIL # BLD MANUAL: 0.28 10*3/MM3 (ref 0–0.4)
EOSINOPHIL NFR BLD MANUAL: 5.1 % (ref 0.3–6.2)
ERYTHROCYTE [DISTWIDTH] IN BLOOD BY AUTOMATED COUNT: 12.8 % (ref 12.3–15.4)
GFR SERPL CREATININE-BSD FRML MDRD: 88 ML/MIN/1.73
GLOBULIN UR ELPH-MCNC: 3.1 GM/DL
GLUCOSE SERPL-MCNC: 86 MG/DL (ref 65–99)
HBA1C MFR BLD: 6 % (ref 4.8–5.6)
HCT VFR BLD AUTO: 40.5 % (ref 34–46.6)
HDLC SERPL-MCNC: 76 MG/DL (ref 40–60)
HGB BLD-MCNC: 13.4 G/DL (ref 12–15.9)
LDLC SERPL CALC-MCNC: 99 MG/DL (ref 0–100)
LDLC/HDLC SERPL: 1.28 {RATIO}
LYMPHOCYTES # BLD MANUAL: 2.35 10*3/MM3 (ref 0.7–3.1)
LYMPHOCYTES NFR BLD MANUAL: 42.4 % (ref 19.6–45.3)
LYMPHOCYTES NFR BLD MANUAL: 7.1 % (ref 5–12)
MCH RBC QN AUTO: 29.5 PG (ref 26.6–33)
MCHC RBC AUTO-ENTMCNC: 33.1 G/DL (ref 31.5–35.7)
MCV RBC AUTO: 89 FL (ref 79–97)
MONOCYTES # BLD AUTO: 0.39 10*3/MM3 (ref 0.1–0.9)
NEUTROPHILS # BLD AUTO: 2.41 10*3/MM3 (ref 1.7–7)
NEUTROPHILS NFR BLD MANUAL: 43.4 % (ref 42.7–76)
PLAT MORPH BLD: NORMAL
PLATELET # BLD AUTO: 248 10*3/MM3 (ref 140–450)
PMV BLD AUTO: 11 FL (ref 6–12)
POTASSIUM SERPL-SCNC: 4.1 MMOL/L (ref 3.5–5.2)
PROT SERPL-MCNC: 7.7 G/DL (ref 6–8.5)
RBC # BLD AUTO: 4.55 10*6/MM3 (ref 3.77–5.28)
RBC MORPH BLD: NORMAL
SODIUM SERPL-SCNC: 140 MMOL/L (ref 136–145)
T-UPTAKE NFR SERPL: 1.07 TBI (ref 0.8–1.3)
T4 SERPL-MCNC: 6.89 MCG/DL (ref 4.5–11.7)
TRIGL SERPL-MCNC: 88 MG/DL (ref 0–150)
TSH SERPL DL<=0.05 MIU/L-ACNC: 1.02 UIU/ML (ref 0.27–4.2)
VLDLC SERPL-MCNC: 16 MG/DL (ref 5–40)
WBC # BLD AUTO: 5.55 10*3/MM3 (ref 3.4–10.8)
WBC MORPH BLD: NORMAL

## 2021-08-27 PROCEDURE — 80053 COMPREHEN METABOLIC PANEL: CPT | Performed by: NURSE PRACTITIONER

## 2021-08-27 PROCEDURE — 36415 COLL VENOUS BLD VENIPUNCTURE: CPT | Performed by: NURSE PRACTITIONER

## 2021-08-27 PROCEDURE — 85025 COMPLETE CBC W/AUTO DIFF WBC: CPT | Performed by: NURSE PRACTITIONER

## 2021-08-27 PROCEDURE — 85007 BL SMEAR W/DIFF WBC COUNT: CPT | Performed by: NURSE PRACTITIONER

## 2021-08-27 PROCEDURE — 84479 ASSAY OF THYROID (T3 OR T4): CPT | Performed by: NURSE PRACTITIONER

## 2021-08-27 PROCEDURE — 99214 OFFICE O/P EST MOD 30 MIN: CPT | Performed by: NURSE PRACTITIONER

## 2021-08-27 PROCEDURE — 80061 LIPID PANEL: CPT | Performed by: NURSE PRACTITIONER

## 2021-08-27 PROCEDURE — 83036 HEMOGLOBIN GLYCOSYLATED A1C: CPT | Performed by: NURSE PRACTITIONER

## 2021-08-27 PROCEDURE — 84436 ASSAY OF TOTAL THYROXINE: CPT | Performed by: NURSE PRACTITIONER

## 2021-08-27 PROCEDURE — 84443 ASSAY THYROID STIM HORMONE: CPT | Performed by: NURSE PRACTITIONER

## 2021-08-27 RX ORDER — MONTELUKAST SODIUM 10 MG/1
10 TABLET ORAL DAILY
COMMUNITY
End: 2021-12-20 | Stop reason: SDUPTHER

## 2021-08-27 RX ORDER — BUDESONIDE AND FORMOTEROL FUMARATE DIHYDRATE 160; 4.5 UG/1; UG/1
2 AEROSOL RESPIRATORY (INHALATION) 2 TIMES DAILY
COMMUNITY
End: 2021-08-27

## 2021-08-27 RX ORDER — CARBOXYMETHYLCELLULOSE SODIUM 0.5 G/100ML
1 SOLUTION/ DROPS OPHTHALMIC DAILY
COMMUNITY
Start: 2021-07-28

## 2021-08-27 RX ORDER — HYDROXYCHLOROQUINE SULFATE 200 MG/1
200 TABLET, FILM COATED ORAL DAILY
COMMUNITY
End: 2023-02-28

## 2021-08-27 RX ORDER — PSEUDOEPHEDRINE HCL 30 MG
2 TABLET ORAL AS NEEDED
COMMUNITY
End: 2022-05-02

## 2021-08-27 RX ORDER — CETIRIZINE HYDROCHLORIDE 10 MG/1
10 TABLET ORAL DAILY
COMMUNITY
Start: 2020-12-28 | End: 2022-01-24 | Stop reason: SDUPTHER

## 2021-08-27 NOTE — PROGRESS NOTES
"Chief Complaint  Lower Extremity Issue (pt has swelling in her ankle area, states it's also painful), Weight Gain (pt would like a \"diet aid\" to help her lose weight), and Insomnia (pt needs refill on trazodone )    Subjective          Yuliana Da Silva presents to [facility]  Impaired fasting glucose-  Noted on previous labs.  Most recent A1c 6.0.  Patient states she is interested in starting a medication to help with weight loss.  She is considering oral Ozempic, states she would like to try this if insurance will pay for it.  Patient states she continues to gain weight although she is trying to lose.    Insomnia-  Managed with trazodone, patient requesting a refill.    Left ankle pain-  Patient reports swelling and pain directly superior to the ankle, lateral aspect of leg.  Denies injury, erythema, warmth.  Patient reports symptoms greater than 1 year.  Symptoms are intermittent.  Does not seem to be caused by anything.  Improves with Tylenol.  Patient states she was evaluated by Dr. Betts, was told to return to clinic during an episode of swelling for imaging.    HTN-  Managed with amlodipine, HCTZ.  She does not check her blood pressure at home.  Denies chest pain, blurry vision, headache, leg swelling.        Objective   Vital Signs:   /82   Pulse 75   Temp 97.9 °F (36.6 °C)   Ht 165.1 cm (65\")   Wt 94.3 kg (208 lb)   SpO2 100%   BMI 34.61 kg/m²     Physical Exam  Constitutional:       Appearance: Normal appearance. She is normal weight.   HENT:      Head: Normocephalic and atraumatic.      Nose: Nose normal.      Mouth/Throat:      Mouth: Mucous membranes are moist.      Pharynx: Oropharynx is clear.   Eyes:      Extraocular Movements: Extraocular movements intact.      Conjunctiva/sclera: Conjunctivae normal.      Pupils: Pupils are equal, round, and reactive to light.   Cardiovascular:      Rate and Rhythm: Normal rate and regular rhythm.    "   Heart sounds: Normal heart sounds.   Pulmonary:      Effort: Pulmonary effort is normal.      Breath sounds: Normal breath sounds.   Musculoskeletal:      Comments: Trace edema superior to left ankle, lateral aspect.  Full ROM, mild tenderness on palpation.   Skin:     General: Skin is warm and dry.   Neurological:      General: No focal deficit present.      Mental Status: She is alert and oriented to person, place, and time.   Psychiatric:         Mood and Affect: Mood normal.         Behavior: Behavior normal.         Thought Content: Thought content normal.        Result Review :                 Assessment and Plan    Diagnoses and all orders for this visit:    1. Acute left ankle pain (Primary)  Comments:  Mild swelling superior to left ankle.  X-ray in clinic today.  Will determine further invention based on results of imaging.  Orders:  -     XR Ankle 3+ View Left (In Office)    2. Essential hypertension  Assessment & Plan:  Mild elevation in clinic, continue amlodipine, HCTZ.  Encourage patient to monitor blood pressure at home and to call with consistent elevations greater than 130s/80s.  Labs in clinic today to include CBC, CMP.    Orders:  -     CBC w AUTO Differential  -     Comprehensive metabolic panel  -     Lipid panel  -     Thyroid Panel With TSH    3. Impaired fasting glucose  Assessment & Plan:  Most recent A1c 6.0.  Will check labs in clinic today.  Could consider Ozempic, also discussed the option for Saxenda.  Patient will research options, will determine further plan of action based on lab results.    Orders:  -     Hemoglobin A1c    4. Weight gain  -     Thyroid Panel With TSH    5. Insomnia, unspecified type  Assessment & Plan:  Will refill trazodone at this time.        Follow Up   Return in about 1 month (around 9/27/2021) for Follow up with Dr. Betts.  Patient was given instructions and counseling regarding her condition or for health maintenance advice. Please see specific  information pulled into the AVS if appropriate.

## 2021-08-27 NOTE — ASSESSMENT & PLAN NOTE
Mild elevation in clinic, continue amlodipine, HCTZ.  Encourage patient to monitor blood pressure at home and to call with consistent elevations greater than 130s/80s.  Labs in clinic today to include CBC, CMP.

## 2021-08-27 NOTE — ASSESSMENT & PLAN NOTE
Most recent A1c 6.0.  Will check labs in clinic today.  Could consider Ozempic, also discussed the option for Saxenda.  Patient will research options, will determine further plan of action based on lab results.

## 2021-08-30 ENCOUNTER — TELEPHONE (OUTPATIENT)
Dept: INTERNAL MEDICINE | Facility: CLINIC | Age: 71
End: 2021-08-30

## 2021-08-30 DIAGNOSIS — R79.89 ABNORMAL CBC: Primary | ICD-10-CM

## 2021-09-03 ENCOUNTER — TELEPHONE (OUTPATIENT)
Dept: INTERNAL MEDICINE | Facility: CLINIC | Age: 71
End: 2021-09-03

## 2021-09-03 NOTE — TELEPHONE ENCOUNTER
Caller: Yuliana Da Silva    Relationship to patient: Self    Best call back number: [phone]    Patient is needing: PATIENT WOULD ,LIKE A CALL BACK REGARDING HER XRAY AND LABS RUN LAST WEEK. PLEASE CALL PATIENT AND ADVISE. AND REQUESTING A REFILL ON HER TRAZODONE 50 MG ONCE DAILY. HAS ABOUT 3 DAYS LEFT.      [facility] [place] [facility] -  [place], [place] - [address] - [phone]  - [phone]   [phone]

## 2021-09-07 ENCOUNTER — TELEPHONE (OUTPATIENT)
Dept: INTERNAL MEDICINE | Facility: CLINIC | Age: 71
End: 2021-09-07

## 2021-09-08 RX ORDER — TRAZODONE HYDROCHLORIDE 50 MG/1
50 TABLET ORAL NIGHTLY
Qty: 60 TABLET | Refills: 1 | Status: SHIPPED | OUTPATIENT
Start: 2021-09-08 | End: 2021-10-15

## 2021-09-08 NOTE — TELEPHONE ENCOUNTER
Patient states that she was prescribed the Trazodone in 2019 by Luna jeff and only uses medication occasionally. Discussed this with Dr. Betts who gave verbal approval for patient to have a refill of this medication. Medication sent to pharmacy.

## 2021-09-23 RX ORDER — OMEPRAZOLE 20 MG/1
CAPSULE, DELAYED RELEASE ORAL
Qty: 180 CAPSULE | Refills: 1 | Status: SHIPPED | OUTPATIENT
Start: 2021-09-23 | End: 2021-10-20 | Stop reason: SDUPTHER

## 2021-10-01 ENCOUNTER — TELEPHONE (OUTPATIENT)
Dept: INTERNAL MEDICINE | Facility: CLINIC | Age: 71
End: 2021-10-01

## 2021-10-01 DIAGNOSIS — Z12.31 VISIT FOR SCREENING MAMMOGRAM: Primary | ICD-10-CM

## 2021-10-01 NOTE — TELEPHONE ENCOUNTER
Caller: Yuliana Da Silva    Relationship: Self    Best call back number: [phone]    What orders are you requesting (i.e. lab or imaging): MAMMOGRAM    In what timeframe would the patient need to come in: WHEN POSSIBLE    Additional notes: PATIENT WOULD LIKE TO BE SCHEDULED FOR  A MAMMOGRAM.

## 2021-10-01 NOTE — TELEPHONE ENCOUNTER
Pt had mammo done in January with advisory for repeat in 12 months. This does not need to be ordered again, correct?

## 2021-10-06 NOTE — TELEPHONE ENCOUNTER
Called patient and let her know that the orders are in and doesn't need her mammo until 1-22-22. Patient stated understanding and said she has already set up appointment. No other issues or concerns noted currently per patient.

## 2021-10-14 NOTE — PROGRESS NOTES
[facility]  Cardiology progress Note    Patient Name: Yuliana Da Silva  : 1950    CHIEF COMPLAINT  Palpitations.      Subjective   Subjective     HISTORY OF PRESENT ILLNESS    Yuliana Da Silva is a 71 y.o. female with history of palpitations.  No further palpitations.  No chest pain.    Review of Systems:   Constitutional no fever,  no weight loss   Skin no rash   Otolaryngeal no difficulty swallowing   Cardiovascular See HPI   Pulmonary no cough, no sputum production   Gastrointestinal no constipation, no diarrhea   Genitourinary no dysuria, no hematuria   Hematologic no easy bruisability, no abnormal bleeding   Musculoskeletal no muscle pain   Neurologic no dizziness, no falls         Personal History     Social History:  reports that she has never smoked. She has never used smokeless tobacco. She reports that she does not drink alcohol and does not use drugs.    Home Medications:  Current Outpatient Medications on File Prior to Visit   Medication Sig   • ALBUTEROL SULFATE IN Inhale.   • amLODIPine (NORVASC) 2.5 MG tablet Take 2.5 mg by mouth Daily.   • aspirin 81 MG EC tablet Take 81 mg by mouth Daily.   • Budesonide-Formoterol Fumarate (SYMBICORT IN) Inhale 1 puff As Needed.   • cetirizine (zyrTEC) 10 MG tablet Take 10 mg by mouth Daily.   • Chlorpheniramine Maleate (CHLORPHEN SR PO) Take 10 mg by mouth Daily.   • Cholecalciferol (Vitamin D) 50 MCG (2000 UT) tablet Take 2,000 Units by mouth Daily.   • cycloSPORINE (RESTASIS) 0.05 % ophthalmic emulsion 1 drop 2 (Two) Times a Day.   • hydroCHLOROthiazide (HYDRODIURIL) 12.5 MG tablet Take 12.5 mg by mouth Daily.   • hydroxychloroquine (Plaquenil) 200 MG tablet Take 200 mg by mouth Daily.   • Lubricating Plus Eye Drops 0.5 % solution Administer 1 drop to both eyes Daily.   • metoprolol succinate XL (TOPROL-XL) 25 MG 24 hr tablet Take 25 mg by mouth Daily.   • mometasone (Asmanex, 120 Metered Doses,) 220 MCG/INH inhaler Inhale 2 puffs  Every Night.   • mometasone (NASONEX) 50 MCG/ACT nasal spray 2 sprays into the nostril(s) as directed by provider As Needed.   • montelukast (Singulair) 10 MG tablet Take 10 mg by mouth Daily.   • multivitamin with minerals tablet tablet Take 1 tablet by mouth Daily.   • Omega 3-6-9 Fatty Acids (OMEGA 3-6-9 PO) Take 1 capsule by mouth Daily.   • omeprazole (priLOSEC) 20 MG capsule TAKE 1 CAPSULE BY MOUTH TWICE DAILY   • Probiotic Product (PROBIOTIC-10 PO) Take  by mouth Daily.   • pseudoephedrine (SUDAFED) 30 MG tablet Take 2 tablets by mouth As Needed.   • traZODone (DESYREL) 50 MG tablet Take 1 tablet by mouth Every Night.   • [DISCONTINUED] amLODIPine (NORVASC) 5 MG tablet Take 5 mg by mouth Daily.     No current facility-administered medications on file prior to visit.     Allergies:  Allergies   Allergen Reactions   • Codeine Hives   • Levofloxacin Hives and Diarrhea   • Sulfamethoxazole-Trimethoprim Diarrhea   • Tetracycline Hives   • Tetracyclines & Related GI Intolerance   • Sulfa Antibiotics GI Intolerance       Objective    Objective       Vitals:   Heart Rate:  [79] 79  BP: (135-157)/(69-73) 135/73  Body mass index is 33.95 kg/m².     Physical Exam:   Constitutional: Awake, alert, No acute distress    Eyes: PERRLA, sclerae anicteric, no conjunctival injection   HENT: NCAT, mucous membranes moist   Neck: Supple, no thyromegaly, no lymphadenopathy, trachea midline   Respiratory: Clear to auscultation bilaterally, nonlabored respirations    Cardiovascular: RRR, no murmurs or rubs. Palpable pedal pulses bilaterally   Musculoskeletal: No bilateral ankle edema, no cyanosis to extremities   Psychiatric: Appropriate affect, cooperative   Neurologic: Oriented x 3, strength symmetric in all extremities, Cranial Nerves grossly intact to confrontation, speech clear   Skin: No rashes.    Result Review    Result Review:  I have personally reviewed the available results from  [x]  Laboratory  [x]  EKG  [x]   Cardiology  [x]  Medications  [x]  Old records  []  Other:   Procedures  Lab Results   Component Value Date    CHOL 191 08/27/2021     Lab Results   Component Value Date    TRIG 88 08/27/2021    TRIG 55 03/19/2021    TRIG 64 04/07/2020     Lab Results   Component Value Date    HDL 76 (H) 08/27/2021    HDL 82 (H) 03/19/2021    HDL 83 (H) 04/07/2020     Lab Results   Component Value Date    LDL 99 08/27/2021    LDL 89 03/19/2021    LDL 89 04/07/2020     Lab Results   Component Value Date    VLDL 16 08/27/2021    VLDL 11 03/19/2021    VLDL 13 04/07/2020         Impression/Plan:  1.  Palpitations/sinus tachycardia improved: Continue Toprol-XL 25 mg once a day.  2.  Precordial atypical chest pain: Negative stress test.  3.  Hypertension: Continue amlodipine 2.5 mg once a day.  Blood pressure controlled at home.  4.  Hyperlipidemia: Lipid profile is reviewed.        Toby Lin MD   10/15/21   12:32 EDT

## 2021-10-15 ENCOUNTER — OFFICE VISIT (OUTPATIENT)
Dept: CARDIOLOGY | Facility: CLINIC | Age: 71
End: 2021-10-15

## 2021-10-15 VITALS
SYSTOLIC BLOOD PRESSURE: 135 MMHG | HEART RATE: 79 BPM | DIASTOLIC BLOOD PRESSURE: 73 MMHG | WEIGHT: 204 LBS | HEIGHT: 65 IN | BODY MASS INDEX: 33.99 KG/M2

## 2021-10-15 DIAGNOSIS — I10 HYPERTENSION, ESSENTIAL: ICD-10-CM

## 2021-10-15 DIAGNOSIS — R00.2 PALPITATIONS: Primary | ICD-10-CM

## 2021-10-15 PROCEDURE — 99213 OFFICE O/P EST LOW 20 MIN: CPT | Performed by: SPECIALIST

## 2021-10-15 RX ORDER — CYCLOSPORINE 0.5 MG/ML
1 EMULSION OPHTHALMIC 2 TIMES DAILY
COMMUNITY

## 2021-10-15 RX ORDER — AMLODIPINE BESYLATE 2.5 MG/1
2.5 TABLET ORAL DAILY
COMMUNITY
Start: 2021-07-22 | End: 2021-10-20

## 2021-10-15 RX ORDER — MULTIPLE VITAMINS W/ MINERALS TAB 9MG-400MCG
1 TAB ORAL DAILY
COMMUNITY
End: 2022-01-11

## 2021-10-20 ENCOUNTER — OFFICE VISIT (OUTPATIENT)
Dept: GASTROENTEROLOGY | Facility: CLINIC | Age: 71
End: 2021-10-20

## 2021-10-20 VITALS
DIASTOLIC BLOOD PRESSURE: 84 MMHG | SYSTOLIC BLOOD PRESSURE: 143 MMHG | BODY MASS INDEX: 34.22 KG/M2 | HEIGHT: 65 IN | WEIGHT: 205.4 LBS | HEART RATE: 84 BPM

## 2021-10-20 DIAGNOSIS — K86.1 CHRONIC PANCREATITIS, UNSPECIFIED PANCREATITIS TYPE (HCC): ICD-10-CM

## 2021-10-20 DIAGNOSIS — K21.9 GASTROESOPHAGEAL REFLUX DISEASE, UNSPECIFIED WHETHER ESOPHAGITIS PRESENT: Primary | ICD-10-CM

## 2021-10-20 PROCEDURE — 99213 OFFICE O/P EST LOW 20 MIN: CPT | Performed by: NURSE PRACTITIONER

## 2021-10-20 RX ORDER — OMEPRAZOLE 20 MG/1
20 CAPSULE, DELAYED RELEASE ORAL 2 TIMES DAILY
Qty: 180 CAPSULE | Refills: 1 | Status: SHIPPED | OUTPATIENT
Start: 2021-10-20

## 2021-10-20 NOTE — PROGRESS NOTES
Chief Complaint  Follow-up (medication refills)    Yuliana Da Silva is a 71 y.o. female who presents to [facility] for follow-up of GERD and atypical chest pain.  History of present Illness  She reports that reflux is controlled with current medication.      Denies change in bowel pattern, hematochezia and melena.      Reports having a difficult time losing weight and is concerned about weight loss medications due to history of chronic pancreatitis.  She was previously followed by Dr. Roberts and underwent EUS which was c/w chronic pancreatitis.  She is asymptomatic at this time.            Past Medical History:   Diagnosis Date   • Allergic rhinitis 06/04/2013   • Anemia    • Asthma     W/ ACUTE EXACERBATION   • Atypical chest pain    • Cataract    • Cough variant asthma 06/09/2014   • Gastroesophageal reflux disease    • Headache    • Hiatal hernia    • HTN (hypertension)    • Impaired fasting glucose    • Insomnia    • Leukocytopenia    • Lupus (HCC)    • Palpitations    • Pancreatitis     W/ STENT PLACEMENT   • Screening for breast cancer 11/2018    WNL   • SOB (shortness of breath)        Past Surgical History:   Procedure Laterality Date   • COLONOSCOPY  2008   • ENDOSCOPY  2004   • HYSTERECTOMY  1998    FOR FIBROIDS. BOTH ovaries removed          Current Outpatient Medications:   •  ALBUTEROL SULFATE IN, Inhale., Disp: , Rfl:   •  Budesonide-Formoterol Fumarate (SYMBICORT IN), Inhale 1 puff As Needed., Disp: , Rfl:   •  cetirizine (zyrTEC) 10 MG tablet, Take 10 mg by mouth Daily., Disp: , Rfl:   •  Chlorpheniramine Maleate (CHLORPHEN SR PO), Take 10 mg by mouth Daily., Disp: , Rfl:   •  Cholecalciferol (Vitamin D) 50 MCG (2000 UT) tablet, Take 2,000 Units by mouth Daily., Disp: , Rfl:   •  cycloSPORINE (RESTASIS) 0.05 % ophthalmic emulsion, 1 drop 2 (Two) Times a Day., Disp: , Rfl:   •  hydroCHLOROthiazide (HYDRODIURIL) 12.5 MG tablet, Take 12.5 mg by mouth Daily.,  Disp: , Rfl:   •  hydroxychloroquine (Plaquenil) 200 MG tablet, Take 200 mg by mouth Daily., Disp: , Rfl:   •  Lubricating Plus Eye Drops 0.5 % solution, Administer 1 drop to both eyes Daily., Disp: , Rfl:   •  metoprolol succinate XL (TOPROL-XL) 25 MG 24 hr tablet, Take 25 mg by mouth Daily., Disp: , Rfl:   •  mometasone (Asmanex, 120 Metered Doses,) 220 MCG/INH inhaler, Inhale 2 puffs Every Night., Disp: , Rfl:   •  mometasone (NASONEX) 50 MCG/ACT nasal spray, 2 sprays into the nostril(s) as directed by provider As Needed., Disp: , Rfl:   •  montelukast (Singulair) 10 MG tablet, Take 10 mg by mouth Daily., Disp: , Rfl:   •  multivitamin with minerals tablet tablet, Take 1 tablet by mouth Daily., Disp: , Rfl:   •  Omega 3-6-9 Fatty Acids (OMEGA 3-6-9 PO), Take 1 capsule by mouth Daily., Disp: , Rfl:   •  omeprazole (priLOSEC) 20 MG capsule, Take 1 capsule by mouth 2 (Two) Times a Day., Disp: 180 capsule, Rfl: 1  •  Probiotic Product (PROBIOTIC-10 PO), Take  by mouth Daily., Disp: , Rfl:   •  pseudoephedrine (SUDAFED) 30 MG tablet, Take 2 tablets by mouth As Needed., Disp: , Rfl:      Allergies   Allergen Reactions   • Codeine Hives   • Levofloxacin Hives and Diarrhea   • Sulfamethoxazole-Trimethoprim Diarrhea   • Tetracycline Hives   • Tetracyclines & Related GI Intolerance   • Sulfa Antibiotics GI Intolerance       Family History   Problem Relation Age of Onset   • Diabetes Father    • Hypertension Mother    • Lung cancer Brother         Social History     Social History Narrative   • Not on file       Objective     Review of Systems   Constitutional: Negative for fever and unexpected weight loss.   Respiratory: Negative for cough and shortness of breath.    Cardiovascular: Negative for chest pain and palpitations.   Gastrointestinal:        See HPI   Neurological: Negative for weakness and confusion.        Vital Signs:   /84 (BP Location: Left arm, Patient Position: Sitting, Cuff Size: Adult)   Pulse 84   " Ht 165.1 cm (65\")   Wt 93.2 kg (205 lb 6.4 oz)   BMI 34.18 kg/m²       Physical Exam  Constitutional:       General: She is not in acute distress.     Appearance: Normal appearance. She is well-developed and normal weight.   HENT:      Head: Normocephalic and atraumatic.   Eyes:      Conjunctiva/sclera: Conjunctivae normal.      Pupils: Pupils are equal, round, and reactive to light.      Visual Fields: Right eye visual fields normal and left eye visual fields normal.   Cardiovascular:      Rate and Rhythm: Normal rate and regular rhythm.      Heart sounds: Normal heart sounds.   Pulmonary:      Effort: Pulmonary effort is normal. No retractions.      Breath sounds: Normal breath sounds and air entry.   Abdominal:      General: Bowel sounds are normal.      Palpations: Abdomen is soft.      Tenderness: There is no abdominal tenderness.      Comments: No appreciable hepatosplenomegaly or ascites   Musculoskeletal:         General: Normal range of motion.      Cervical back: Neck supple.      Right lower leg: No edema.      Left lower leg: No edema.   Lymphadenopathy:      Cervical: No cervical adenopathy.   Skin:     General: Skin is warm and dry.      Findings: No lesion.   Neurological:      General: No focal deficit present.      Mental Status: She is alert and oriented to person, place, and time.   Psychiatric:         Mood and Affect: Mood and affect normal.         Behavior: Behavior normal.         Result Review :                   Assessment and Plan    Diagnoses and all orders for this visit:    1. Gastroesophageal reflux disease, unspecified whether esophagitis present (Primary)  -     omeprazole (priLOSEC) 20 MG capsule; Take 1 capsule by mouth 2 (Two) Times a Day.  Dispense: 180 capsule; Refill: 1    2. Chronic pancreatitis, unspecified pancreatitis type (HCC)      Continue omeprazole his heartburn is currently controlled.  Recommend follow-up with PCP regarding weight management.      Follow Up "   Return in about 1 year (around 10/20/2022).  Patient was given instructions and counseling regarding her condition or for health maintenance advice. Please see specific information pulled into the AVS if appropriate.

## 2021-12-20 RX ORDER — MONTELUKAST SODIUM 10 MG/1
10 TABLET ORAL DAILY
Qty: 90 TABLET | Refills: 1 | Status: SHIPPED | OUTPATIENT
Start: 2021-12-20 | End: 2022-07-19 | Stop reason: SDUPTHER

## 2021-12-20 NOTE — TELEPHONE ENCOUNTER
Caller: Yuliana Da Silva    Relationship: Self    Best call back number: [phone]    Requested Prescriptions:   Requested Prescriptions     Pending Prescriptions Disp Refills   • montelukast (Singulair) 10 MG tablet       Sig: Take 1 tablet by mouth Daily.        Pharmacy where request should be sent: [facility] [place] [facility] [place], [place] - [address] [place] - [phone]  - [phone] FX     Additional details provided by patient: PATIENT WOULD LIKE A 90 DAY SUPPLY OF THIS MEDICATION    Does the patient have less than a 3 day supply:  [x] Yes  [] No    Delores Pruitt Rep   12/20/21 09:15 EST

## 2022-01-11 ENCOUNTER — OFFICE VISIT (OUTPATIENT)
Dept: INTERNAL MEDICINE | Facility: CLINIC | Age: 72
End: 2022-01-11

## 2022-01-11 VITALS
HEART RATE: 73 BPM | SYSTOLIC BLOOD PRESSURE: 130 MMHG | BODY MASS INDEX: 33.41 KG/M2 | TEMPERATURE: 97.6 F | HEIGHT: 65 IN | OXYGEN SATURATION: 99 % | DIASTOLIC BLOOD PRESSURE: 70 MMHG | WEIGHT: 200.5 LBS | RESPIRATION RATE: 16 BRPM

## 2022-01-11 DIAGNOSIS — R05.9 COUGH: Primary | ICD-10-CM

## 2022-01-11 PROCEDURE — 99214 OFFICE O/P EST MOD 30 MIN: CPT | Performed by: PHYSICIAN ASSISTANT

## 2022-01-11 RX ORDER — CIPROFLOXACIN 500 MG/1
500 TABLET, FILM COATED ORAL 2 TIMES DAILY
Qty: 10 TABLET | Refills: 0 | Status: SHIPPED | OUTPATIENT
Start: 2022-01-11 | End: 2022-01-16

## 2022-01-11 NOTE — PROGRESS NOTES
"Chief Complaint  Cough, Shortness of Breath (going on for abouta month, went to the Pulm.  and was given meds.  Finished Z pack, but still has it.  Given steriod, but didn't take it.), and Abdominal Pain (Ribs hurt when she coughs)    Subjective          Yuliana Da Silva presents to [facility]  Cough, SOB- symptoms started a few weeks ago.  She was seen by pulm and put on azithromycin last week.  It has not made any difference.  She is having pain on lower left side.  Tenderness in her chest to the touch.  She was given prednisone but hasn't taken it because she tries to limit her steroid use. Patient states that she has had Cipro before and did well with that. No fevers or difficulty breathing.      Objective   Vital Signs:   /70   Pulse 73   Temp 97.6 °F (36.4 °C) (Temporal)   Resp 16   Ht 165.1 cm (65\")   Wt 90.9 kg (200 lb 8 oz)   SpO2 99%   BMI 33.36 kg/m²     Physical Exam  Vitals reviewed.   Constitutional:       Appearance: Normal appearance. She is well-developed.   HENT:      Head: Normocephalic and atraumatic.      Right Ear: Tympanic membrane, ear canal and external ear normal.      Left Ear: Tympanic membrane, ear canal and external ear normal.      Mouth/Throat:      Pharynx: No oropharyngeal exudate.   Eyes:      Conjunctiva/sclera: Conjunctivae normal.      Pupils: Pupils are equal, round, and reactive to light.   Cardiovascular:      Rate and Rhythm: Normal rate and regular rhythm.      Heart sounds: No murmur heard.  No friction rub. No gallop.    Pulmonary:      Effort: Pulmonary effort is normal.      Breath sounds: Normal breath sounds. No wheezing or rhonchi.   Abdominal:      General: Bowel sounds are normal. There is no distension.      Palpations: Abdomen is soft.      Tenderness: There is no abdominal tenderness.   Skin:     General: Skin is warm and dry.   Neurological:      Mental Status: She is alert and oriented to " person, place, and time.      Cranial Nerves: No cranial nerve deficit.   Psychiatric:         Mood and Affect: Mood and affect normal.         Behavior: Behavior normal.         Thought Content: Thought content normal.         Judgment: Judgment normal.        Result Review :          Procedures      Assessment and Plan    Diagnoses and all orders for this visit:    1. Cough (Primary)  Assessment & Plan:  Will get CXR in office.  Vitals stable.  Likely will treat with Cipro and treat as exacerbation of chronic lung disease.  Will send patient to [facility] for second opinion for management of lung conditions. If patient develops new or worsening symptoms she needs to return, especially fevers, chest pain or difficulty breathing.    Orders:  -     XR Chest PA & Lateral (In Office)  -     Ambulatory Referral to Pulmonology    Other orders  -     ciprofloxacin (Cipro) 500 MG tablet; Take 1 tablet by mouth 2 (Two) Times a Day for 5 days.  Dispense: 10 tablet; Refill: 0            Follow Up   Return if symptoms worsen or fail to improve.  Patient was given instructions and counseling regarding her condition or for health maintenance advice. Please see specific information pulled into the AVS if appropriate.

## 2022-01-12 NOTE — ASSESSMENT & PLAN NOTE
Will get CXR in office.  Vitals stable.  Likely will treat with Cipro and treat as exacerbation of chronic lung disease.  Will send patient to [facility] for second opinion for management of lung conditions. If patient develops new or worsening symptoms she needs to return, especially fevers, chest pain or difficulty breathing.

## 2022-01-14 ENCOUNTER — TELEPHONE (OUTPATIENT)
Dept: INTERNAL MEDICINE | Facility: CLINIC | Age: 72
End: 2022-01-14

## 2022-01-14 NOTE — TELEPHONE ENCOUNTER
Caller: Rekha Yuliana L    Relationship: Self    Best call back number: [phone]    What medications are you currently taking:   Current Outpatient Medications on File Prior to Visit   Medication Sig Dispense Refill   • ALBUTEROL SULFATE IN Inhale.     • Budesonide-Formoterol Fumarate (SYMBICORT IN) Inhale 1 puff As Needed.     • cetirizine (zyrTEC) 10 MG tablet Take 10 mg by mouth Daily.     • Chlorpheniramine Maleate (CHLORPHEN SR PO) Take 10 mg by mouth Daily.     • Cholecalciferol (Vitamin D) 50 MCG (2000 UT) tablet Take 2,000 Units by mouth Daily.     • ciprofloxacin (Cipro) 500 MG tablet Take 1 tablet by mouth 2 (Two) Times a Day for 5 days. 10 tablet 0   • cycloSPORINE (RESTASIS) 0.05 % ophthalmic emulsion 1 drop 2 (Two) Times a Day.     • hydroCHLOROthiazide (HYDRODIURIL) 12.5 MG tablet Take 12.5 mg by mouth Daily.     • hydroxychloroquine (Plaquenil) 200 MG tablet Take 200 mg by mouth Daily.     • Lubricating Plus Eye Drops 0.5 % solution Administer 1 drop to both eyes Daily.     • metoprolol succinate XL (TOPROL-XL) 25 MG 24 hr tablet Take 25 mg by mouth Daily.     • mometasone (Asmanex, 120 Metered Doses,) 220 MCG/INH inhaler Inhale 2 puffs Every Night.     • mometasone (NASONEX) 50 MCG/ACT nasal spray 2 sprays into the nostril(s) as directed by provider As Needed.     • montelukast (Singulair) 10 MG tablet Take 1 tablet by mouth Daily. 90 tablet 1   • Omega 3-6-9 Fatty Acids (OMEGA 3-6-9 PO) Take 1 capsule by mouth Daily.     • omeprazole (priLOSEC) 20 MG capsule Take 1 capsule by mouth 2 (Two) Times a Day. 180 capsule 1   • Probiotic Product (PROBIOTIC-10 PO) Take  by mouth Daily.     • pseudoephedrine (SUDAFED) 30 MG tablet Take 2 tablets by mouth As Needed.       No current facility-administered medications on file prior to visit.        Which medication are you concerned about:    ciprofloxacin (Cipro) 500 MG tablet     Who prescribed you this medication: JEREMY ROJO    What are your  concerns: PATIENT STATES THAT SHE SEEN JEREMYHARPAL DIASB ON Monday AND WAS PRESCRIBED    ciprofloxacin (Cipro) 500 MG tablet   PATIENT STATES THAT SHE WAS TOLD BY THE PHARMACY THAT SHE SHOULD NOT BE TAKING THIS MEDICATION DUE TO THE OTHER MEDICATION SHE IS ALREADY ON. PATIENT IS WANTING A DIFFERENT ANTIBIOTIC IN PLACE OF THIS ONE. PLEASE ADVISE.     [facility] [address] [place] [phone]

## 2022-01-18 RX ORDER — AMOXICILLIN AND CLAVULANATE POTASSIUM 875; 125 MG/1; MG/1
1 TABLET, FILM COATED ORAL 2 TIMES DAILY
Qty: 20 TABLET | Refills: 0 | Status: SHIPPED | OUTPATIENT
Start: 2022-01-18 | End: 2022-01-28

## 2022-01-24 RX ORDER — CETIRIZINE HYDROCHLORIDE 10 MG/1
10 TABLET ORAL DAILY
Qty: 90 TABLET | Refills: 1 | Status: SHIPPED | OUTPATIENT
Start: 2022-01-24 | End: 2022-08-18 | Stop reason: SDUPTHER

## 2022-01-24 NOTE — TELEPHONE ENCOUNTER
Caller: Yuliana Da Silva    Relationship: Self    Best call back number: [phone]    Requested Prescriptions:   Requested Prescriptions     Pending Prescriptions Disp Refills   • cetirizine (zyrTEC) 10 MG tablet       Sig: Take 1 tablet by mouth Daily.        Pharmacy where request should be sent:    [facility] [place] [facility] [place]  [place] - [address] - [phone] [facility] [phone]   [phone]    Additional details provided by patient: PATIENT IS CURRENTLY OUT OF THIS MEDICATION.     Does the patient have less than a 3 day supply:  [x] Yes  [] No    Delores Vaughan Rep   01/24/22 10:39 EST

## 2022-01-26 ENCOUNTER — HOSPITAL ENCOUNTER (OUTPATIENT)
Dept: MAMMOGRAPHY | Facility: HOSPITAL | Age: 72
Discharge: HOME | End: 2022-01-26
Admitting: INTERNAL MEDICINE

## 2022-01-26 DIAGNOSIS — Z12.31 VISIT FOR SCREENING MAMMOGRAM: ICD-10-CM

## 2022-01-26 PROCEDURE — 77063 BREAST TOMOSYNTHESIS BI: CPT

## 2022-01-26 PROCEDURE — 77067 SCR MAMMO BI INCL CAD: CPT

## 2022-02-28 ENCOUNTER — TRANSCRIBE ORDERS (OUTPATIENT)
Dept: LAB | Facility: HOSPITAL | Age: 72
End: 2022-02-28

## 2022-02-28 ENCOUNTER — LAB (OUTPATIENT)
Dept: LAB | Facility: HOSPITAL | Age: 72
End: 2022-02-28

## 2022-02-28 DIAGNOSIS — I10 ESSENTIAL HYPERTENSION, MALIGNANT: ICD-10-CM

## 2022-02-28 DIAGNOSIS — E78.5 HYPERLIPIDEMIA, UNSPECIFIED HYPERLIPIDEMIA TYPE: ICD-10-CM

## 2022-02-28 DIAGNOSIS — R73.03 PREDIABETES: ICD-10-CM

## 2022-02-28 DIAGNOSIS — R73.03 PREDIABETES: Primary | ICD-10-CM

## 2022-02-28 LAB
ALBUMIN SERPL-MCNC: 4.9 G/DL (ref 3.5–5.2)
ALBUMIN/GLOB SERPL: 1.7 G/DL
ALP SERPL-CCNC: 78 U/L (ref 39–117)
ALT SERPL W P-5'-P-CCNC: 28 U/L (ref 1–33)
ANION GAP SERPL CALCULATED.3IONS-SCNC: 12 MMOL/L (ref 5–15)
AST SERPL-CCNC: 30 U/L (ref 1–32)
BILIRUB SERPL-MCNC: 0.5 MG/DL (ref 0–1.2)
BUN SERPL-MCNC: 11 MG/DL (ref 8–23)
BUN/CREAT SERPL: 13.3 (ref 7–25)
CALCIUM SPEC-SCNC: 9.6 MG/DL (ref 8.6–10.5)
CHLORIDE SERPL-SCNC: 105 MMOL/L (ref 98–107)
CHOLEST SERPL-MCNC: 203 MG/DL (ref 0–200)
CO2 SERPL-SCNC: 27 MMOL/L (ref 22–29)
CREAT SERPL-MCNC: 0.83 MG/DL (ref 0.57–1)
EGFRCR SERPLBLD CKD-EPI 2021: 75 ML/MIN/1.73
GLOBULIN UR ELPH-MCNC: 2.9 GM/DL
GLUCOSE SERPL-MCNC: 87 MG/DL (ref 65–99)
HBA1C MFR BLD: 6 % (ref 4.8–5.6)
HDLC SERPL-MCNC: 89 MG/DL (ref 40–60)
LDLC SERPL CALC-MCNC: 104 MG/DL (ref 0–100)
LDLC/HDLC SERPL: 1.16 {RATIO}
POTASSIUM SERPL-SCNC: 3.5 MMOL/L (ref 3.5–5.2)
PROT SERPL-MCNC: 7.8 G/DL (ref 6–8.5)
SODIUM SERPL-SCNC: 144 MMOL/L (ref 136–145)
TRIGL SERPL-MCNC: 54 MG/DL (ref 0–150)
VLDLC SERPL-MCNC: 10 MG/DL (ref 5–40)

## 2022-02-28 PROCEDURE — 80061 LIPID PANEL: CPT

## 2022-02-28 PROCEDURE — 36415 COLL VENOUS BLD VENIPUNCTURE: CPT

## 2022-02-28 PROCEDURE — 80053 COMPREHEN METABOLIC PANEL: CPT

## 2022-02-28 PROCEDURE — 83036 HEMOGLOBIN GLYCOSYLATED A1C: CPT

## 2022-03-22 ENCOUNTER — OFFICE VISIT (OUTPATIENT)
Dept: INTERNAL MEDICINE | Facility: CLINIC | Age: 72
End: 2022-03-22

## 2022-03-22 VITALS
WEIGHT: 202 LBS | DIASTOLIC BLOOD PRESSURE: 80 MMHG | HEIGHT: 65 IN | TEMPERATURE: 97.8 F | SYSTOLIC BLOOD PRESSURE: 140 MMHG | HEART RATE: 80 BPM | OXYGEN SATURATION: 98 % | BODY MASS INDEX: 33.66 KG/M2

## 2022-03-22 DIAGNOSIS — I10 ESSENTIAL HYPERTENSION: ICD-10-CM

## 2022-03-22 DIAGNOSIS — R73.03 PRE-DIABETES: Primary | ICD-10-CM

## 2022-03-22 PROCEDURE — 99214 OFFICE O/P EST MOD 30 MIN: CPT | Performed by: PHYSICIAN ASSISTANT

## 2022-03-22 RX ORDER — AMLODIPINE BESYLATE 2.5 MG/1
2.5 TABLET ORAL DAILY
COMMUNITY
End: 2023-01-31 | Stop reason: SDUPTHER

## 2022-03-22 RX ORDER — METFORMIN HYDROCHLORIDE 500 MG/1
500 TABLET, EXTENDED RELEASE ORAL
Qty: 30 TABLET | Refills: 1 | Status: SHIPPED | OUTPATIENT
Start: 2022-03-22 | End: 2022-05-18 | Stop reason: SDUPTHER

## 2022-03-22 NOTE — ASSESSMENT & PLAN NOTE
Slightly elevated today, continue to monitor daily and call if staying above 140/90.  Will check labs before next visit.

## 2022-03-22 NOTE — ASSESSMENT & PLAN NOTE
Will go ahead and start patient on Metformin.  Will recheck A1c in 2 months prior to visit with me.  Discussed starting ozempic to also help with weight loss but due to risk of pancreatitis flare will hold off on that for now.  Discussed common side effects of metformin including diarrhea, call for any concerns or questions.

## 2022-03-22 NOTE — PROGRESS NOTES
"Chief Complaint  Prediabetes (Would like to talk about ozempic)    Subjective          Yuliana Da Silva presents to [facility]  Pre-diabetes: patient states that she recently had her blood drawn and her levels are still in the pre-diabetic range.  She is interested in starting medication to help lower her risk of developing diabetes.  Patient is interested in starting Ozempic but is concerned about her history of chronic pancreatitis which is current well controlled. Patient has not been on metformin previously.  Patient states that she is planning to work on her diet and exercise by trying to cut back on the sweets and start going to the gym more.  She did discuss the possibility of going to pain management for her chronic pain but she would like to wait on this for now.     Reviewed recent labs with patient, A1c 6.0%.      Objective   Vital Signs:   /80 (BP Location: Right arm, Patient Position: Sitting, Cuff Size: Large Adult)   Pulse 80   Temp 97.8 °F (36.6 °C) (Temporal)   Ht 165.1 cm (65\")   Wt 91.6 kg (202 lb)   SpO2 98%   BMI 33.61 kg/m²     Physical Exam  Vitals reviewed.   Constitutional:       Appearance: Normal appearance. She is well-developed.   HENT:      Head: Normocephalic and atraumatic.   Eyes:      Conjunctiva/sclera: Conjunctivae normal.      Pupils: Pupils are equal, round, and reactive to light.   Cardiovascular:      Rate and Rhythm: Normal rate and regular rhythm.      Heart sounds: No murmur heard.    No friction rub. No gallop.   Pulmonary:      Effort: Pulmonary effort is normal.      Breath sounds: Normal breath sounds. No wheezing or rhonchi.   Skin:     General: Skin is warm and dry.   Neurological:      Mental Status: She is alert and oriented to person, place, and time.      Cranial Nerves: No cranial nerve deficit.   Psychiatric:         Mood and Affect: Mood and affect normal.         Behavior: Behavior normal.       "   Thought Content: Thought content normal.         Judgment: Judgment normal.        Result Review :          Procedures      Assessment and Plan    Diagnoses and all orders for this visit:    1. Pre-diabetes (Primary)  Assessment & Plan:  Will go ahead and start patient on Metformin.  Will recheck A1c in 2 months prior to visit with me.  Discussed starting ozempic to also help with weight loss but due to risk of pancreatitis flare will hold off on that for now.  Discussed common side effects of metformin including diarrhea, call for any concerns or questions.    Orders:  -     metFORMIN ER (GLUCOPHAGE-XR) 500 MG 24 hr tablet; Take 1 tablet by mouth Daily With Breakfast for 30 days.  Dispense: 30 tablet; Refill: 1  -     Hemoglobin A1c; Future    2. Essential hypertension  Assessment & Plan:  Slightly elevated today, continue to monitor daily and call if staying above 140/90.  Will check labs before next visit.    Orders:  -     CBC w AUTO Differential; Future  -     Comprehensive metabolic panel; Future  -     Lipid panel; Future  -     Hemoglobin A1c; Future            Follow Up   Return in about 2 months (around 5/22/2022).  Patient was given instructions and counseling regarding her condition or for health maintenance advice. Please see specific information pulled into the AVS if appropriate.

## 2022-04-19 ENCOUNTER — OFFICE VISIT (OUTPATIENT)
Dept: PULMONOLOGY | Facility: CLINIC | Age: 72
End: 2022-04-19

## 2022-04-19 VITALS
OXYGEN SATURATION: 97 % | HEART RATE: 79 BPM | RESPIRATION RATE: 19 BRPM | DIASTOLIC BLOOD PRESSURE: 77 MMHG | HEIGHT: 65 IN | WEIGHT: 204 LBS | TEMPERATURE: 97.3 F | BODY MASS INDEX: 33.99 KG/M2 | SYSTOLIC BLOOD PRESSURE: 132 MMHG

## 2022-04-19 DIAGNOSIS — R05.9 COUGH: Primary | ICD-10-CM

## 2022-04-19 DIAGNOSIS — J45.909 PERSISTENT ASTHMA WITHOUT COMPLICATION, UNSPECIFIED ASTHMA SEVERITY: ICD-10-CM

## 2022-04-19 DIAGNOSIS — M32.13 SYSTEMIC LUPUS ERYTHEMATOSUS WITH LUNG INVOLVEMENT, UNSPECIFIED SLE TYPE: ICD-10-CM

## 2022-04-19 PROCEDURE — 99204 OFFICE O/P NEW MOD 45 MIN: CPT | Performed by: INTERNAL MEDICINE

## 2022-04-19 RX ORDER — FLUTICASONE PROPIONATE 50 MCG
2 SPRAY, SUSPENSION (ML) NASAL 2 TIMES DAILY
COMMUNITY
Start: 2022-03-28

## 2022-04-19 RX ORDER — PREDNISONE 20 MG/1
TABLET ORAL DAILY PRN
COMMUNITY
Start: 2022-04-05 | End: 2022-12-23

## 2022-04-19 RX ORDER — AZITHROMYCIN 250 MG/1
TABLET, FILM COATED ORAL
COMMUNITY
Start: 2022-04-05 | End: 2022-05-02

## 2022-04-19 NOTE — PROGRESS NOTES
Primary Care Provider  Elicia Betts MD   Referring Provider  Luna Dunlap PA-C      Patient Complaint  Establish Care, Cough (Dry ), Bronchitis (Chronic), Wheezing, and Shortness of Breath      Subjective          Yuliana Da Silva presents to [facility]MONARY [facility]ARE MEDICINE      History of Presenting Illness  Yuliana Da Silva is a 71 y.o. female  presents to [facility] PULMONARY & CRITICAL CARE MEDICINE  for further evaluation and management of chronic cough.  The patient reports history of allergies to medications and asthma, currently on Symbicort 2 puffs at night and in the morning albuterol and as needed Asmanex.  I think she misunderstood the instructions regarding Asmanex and albuterol.  She is also on montelukast.  The patient despite the above management she continues to have significant cough that bothers her and interferes with her sleep.  The patient also reports wheezing especially at night and dyspnea on exertion, modified medical research Curyung dyspnea scale of 3.  The patient's past medical history is significant for SLE diagnosed in 1989 for which she was placed on prednisone and Plaquenil.  She was tapered off prednisone around 6 years ago and continues on Plaquenil 200 mg daily.  The patient denies hemoptysis, fever, chills, night sweats, and weight loss.  She she is a non-smoker.    I have personally reviewed the review of systems, past family, social, medical and surgical histories; and agree with their findings.        Review of Systems  Constitutional symptoms:  Denied complaints   Ear, nose, throat: Denied complaints  Cardiovascular:  Denied complaints  Respiratory: See above  Gastrointestinal: Denied complaints  Musculoskeletal: Denied complaints  Genitourinary: Denied complaints  Allergy / Immunology: Denied complaints  Hematologic: Denied complaints  Neurologic: Denied complaints  Skin: Denied  complaints  Endocrine: Denied complaints  Psychiatric: Denied complaints      Family History   Problem Relation Age of Onset   • Diabetes Father    • Hypertension Mother    • Lung cancer Brother         Social History     Socioeconomic History   • Marital status:    Tobacco Use   • Smoking status: Never Smoker   • Smokeless tobacco: Never Used   • Tobacco comment: Grew up exposed to second hand smoke   Vaping Use   • Vaping Use: Never used   Substance and Sexual Activity   • Alcohol use: Never   • Drug use: Never   • Sexual activity: Defer        Past Medical History:   Diagnosis Date   • Allergic rhinitis 06/04/2013   • Anemia    • Asthma     W/ ACUTE EXACERBATION   • Atypical chest pain    • Cataract    • Cough variant asthma 06/09/2014   • Gastroesophageal reflux disease    • Headache    • Hiatal hernia    • HTN (hypertension)    • Impaired fasting glucose    • Insomnia    • Leukocytopenia    • Lupus (HCC)    • Palpitations    • Pancreatitis     W/ STENT PLACEMENT   • Screening for breast cancer 11/2018    WNL   • SOB (shortness of breath)         Immunization History   Administered Date(s) Administered   • COVID-19 (MODERNA) 1st, 2nd, 3rd Dose Only 02/24/2021   • Fluzone High Dose =>65 Years ([employer] ONLY) 09/01/2021   • Fluzone High-Dose 65+yrs 09/09/2020   • Hepatitis A 04/26/2018   • Influenza TIV (IM) 09/27/2013, 09/26/2017, 09/30/2019   • Pneumococcal Conjugate 13-Valent (PCV13) 10/03/2019   • Pneumococcal Polysaccharide (PPSV23) 11/22/2013         Allergies   Allergen Reactions   • Codeine Hives   • Levofloxacin Hives and Diarrhea   • Sulfamethoxazole-Trimethoprim Diarrhea   • Tetracycline Hives   • Tetracyclines & Related GI Intolerance   • Sulfa Antibiotics GI Intolerance          Current Outpatient Medications:   •  ALBUTEROL SULFATE IN, Inhale., Disp: , Rfl:   •  amLODIPine (NORVASC) 2.5 MG tablet, Take 2.5 mg by mouth Daily., Disp: , Rfl:   •  Budesonide-Formoterol Fumarate (SYMBICORT IN),  Inhale 1 puff As Needed., Disp: , Rfl:   •  cetirizine (zyrTEC) 10 MG tablet, Take 1 tablet by mouth Daily., Disp: 90 tablet, Rfl: 1  •  Cholecalciferol (Vitamin D) 50 MCG (2000 UT) tablet, Take 2,000 Units by mouth Daily., Disp: , Rfl:   •  cycloSPORINE (RESTASIS) 0.05 % ophthalmic emulsion, 1 drop 2 (Two) Times a Day., Disp: , Rfl:   •  fluticasone (FLONASE) 50 MCG/ACT nasal spray, , Disp: , Rfl:   •  hydroCHLOROthiazide (HYDRODIURIL) 12.5 MG tablet, Take 12.5 mg by mouth Daily., Disp: , Rfl:   •  hydroxychloroquine (PLAQUENIL) 200 MG tablet, Take 200 mg by mouth Daily., Disp: , Rfl:   •  Lubricating Plus Eye Drops 0.5 % solution, Administer 1 drop to both eyes Daily., Disp: , Rfl:   •  metFORMIN ER (GLUCOPHAGE-XR) 500 MG 24 hr tablet, Take 1 tablet by mouth Daily With Breakfast for 30 days., Disp: 30 tablet, Rfl: 1  •  metoprolol succinate XL (TOPROL-XL) 25 MG 24 hr tablet, Take 25 mg by mouth Daily., Disp: , Rfl:   •  mometasone (Asmanex, 120 Metered Doses,) 220 MCG/INH inhaler, Inhale 2 puffs Every Night., Disp: , Rfl:   •  montelukast (Singulair) 10 MG tablet, Take 1 tablet by mouth Daily., Disp: 90 tablet, Rfl: 1  •  Omega 3-6-9 Fatty Acids (OMEGA 3-6-9 PO), Take 1 capsule by mouth Daily., Disp: , Rfl:   •  omeprazole (priLOSEC) 20 MG capsule, Take 1 capsule by mouth 2 (Two) Times a Day., Disp: 180 capsule, Rfl: 1  •  Probiotic Product (PROBIOTIC-10 PO), Take  by mouth Daily., Disp: , Rfl:   •  pseudoephedrine (SUDAFED) 30 MG tablet, Take 2 tablets by mouth As Needed., Disp: , Rfl:   •  azithromycin (ZITHROMAX) 250 MG tablet, , Disp: , Rfl:   •  Chlorpheniramine Maleate (CHLORPHEN SR PO), Take 10 mg by mouth Daily., Disp: , Rfl:   •  mometasone (NASONEX) 50 MCG/ACT nasal spray, 2 sprays into the nostril(s) as directed by provider As Needed., Disp: , Rfl:   •  predniSONE (DELTASONE) 20 MG tablet, , Disp: , Rfl:          Objective     Vital Signs:   /77 (BP Location: Left arm, Patient Position: Sitting,  "Cuff Size: Large Adult)   Pulse 79   Temp 97.3 °F (36.3 °C) (Temporal)   Resp 19   Ht 165.1 cm (65\")   Wt 92.5 kg (204 lb)   SpO2 97% Comment: room air  BMI 33.95 kg/m²     Physical Exam  Vital Signs Reviewed   WDWN, Alert, pleasant and well-groomed, NAD.    HEENT:  PERRL, EOMI.  OP, nares clear, no sinus tenderness  Neck:  Supple, no masses  Lymph: no axillary, cervical, supraclavicular lymphadenopathy noted bilaterally  Chest:  good aeration, clear to auscultation bilaterally, no work of breathing noted  CV: RRR, no MGR, pulses 2+, equal.  Abd:  Soft, NT, ND, + BS, no HSM  EXT:  no clubbing, no cyanosis, no edema, no joint tenderness  Neuro:  A&Ox3, CN grossly intact, no focal deficits.  Skin: No rashes or lesions noted       Result Review :   I have personally reviewed the the patient's medical records including provider notes, labs, imaging reports and films.    January 11, 2022      PROCEDURE:  XR CHEST PA AND LATERAL     COMPARISON: [facility], CR, CHEST AP/PA 1 VIEW, 3/09/2021, 17:55.     INDICATIONS:  cough     FINDINGS:          The heart is normal in size.  The lungs are well-expanded and free of infiltrates.     Moderate degenerative spurring is seen in the thoracic spine.     IMPRESSION:               No active cardiopulmonary disease is seen.            KIN JORGENSEN MD         Electronically Signed and Approved By: KIN JORGENSEN MD on 1/11/2022 at 15:04             Pulmonary Functions Testing Results:    No results found for: FEV1, FVC, ZRL0IKO, TLC, DLCO        Assessment and Plan      Patient Active Problem List   Diagnosis   • Essential hypertension   • Impaired fasting glucose   • Weight gain   • Insomnia   • Cough   • Pancreatitis   • Pre-diabetes           Impression and Plan:  Mrs. Da Silva is a 71-year-old -American pleasant lady who presents with chronic dry cough strongly suggestive of asthma but additional SLE related interstitial lung disease could add to the " patient's symptom.  Discussed with the patient the differential diagnosis.  Plan:  1) we will increase the dose of Symbicort to 2 puffs twice daily.  The patient will stop using the Asmanex and continue using albuterol on an as-needed basis.  Montelukast to be continued.  2) the patient is scheduled for high-resolution CT scan of the chest to evaluate for SLE related interstitial lung disease  3) the patient is scheduled for full pulmonary function test including spirometry lung volumes and DLCO to evaluate for the above differential diagnosis.  4) CBC and differential, and total IgE.  CBC done last year showed no eosinophilia.  5) the patient will return for a follow-up visit in late May.        Medications personally reviewed.      Follow Up     Patient was given instructions and counseling regarding her condition or for health maintenance advice. Please see specific information pulled into the AVS if appropriate.

## 2022-04-21 ENCOUNTER — TELEPHONE (OUTPATIENT)
Dept: PULMONOLOGY | Facility: CLINIC | Age: 72
End: 2022-04-21

## 2022-04-21 ENCOUNTER — PREP FOR SURGERY (OUTPATIENT)
Dept: OTHER | Facility: HOSPITAL | Age: 72
End: 2022-04-21

## 2022-04-21 ENCOUNTER — CLINICAL SUPPORT (OUTPATIENT)
Dept: GASTROENTEROLOGY | Facility: CLINIC | Age: 72
End: 2022-04-21

## 2022-04-21 ENCOUNTER — TELEPHONE (OUTPATIENT)
Dept: GASTROENTEROLOGY | Facility: CLINIC | Age: 72
End: 2022-04-21

## 2022-04-21 DIAGNOSIS — Z12.11 COLON CANCER SCREENING: Primary | ICD-10-CM

## 2022-04-21 RX ORDER — PEG-3350, SODIUM SULFATE, SODIUM CHLORIDE, POTASSIUM CHLORIDE, SODIUM ASCORBATE AND ASCORBIC ACID 7.5-2.691G
1000 KIT ORAL TAKE AS DIRECTED
Qty: 1000 ML | Refills: 0 | Status: ON HOLD | OUTPATIENT
Start: 2022-04-21 | End: 2023-03-01

## 2022-04-21 NOTE — PROGRESS NOTES
SPOKE WITH PT ON A DATE FOR COLONOSCOPY OF 8/25/22 . MADE SURE CHART WAS UP TO DATE. WENT OVER PREP AND MAILED OUT INSTRUCTIONS. PUT IN ORDER FOR COLON AND SENT PREP TO PHARMACY

## 2022-04-21 NOTE — TELEPHONE ENCOUNTER
Patient called for guidance on lab testing. Advised the orders are entered in the computer and can be collected at any of the  facilities.  Patient verbalized understanding.

## 2022-04-21 NOTE — TELEPHONE ENCOUNTER
Yuliana Da Silva  REASON FOR CALL encounter for colon screening  SENT IN PREP moviprep  Past Medical History:   Diagnosis Date   • Allergic rhinitis 06/04/2013   • Anemia    • Asthma     W/ ACUTE EXACERBATION   • Atypical chest pain    • Cataract    • Cough variant asthma 06/09/2014   • Gastroesophageal reflux disease    • Headache    • Hiatal hernia    • HTN (hypertension)    • Impaired fasting glucose    • Insomnia    • Leukocytopenia    • Lupus (HCC)    • Palpitations    • Pancreatitis     W/ STENT PLACEMENT   • Screening for breast cancer 11/2018    WNL   • SOB (shortness of breath)      Allergies   Allergen Reactions   • Codeine Hives   • Levofloxacin Hives and Diarrhea   • Sulfamethoxazole-Trimethoprim Diarrhea   • Tetracycline Hives   • Tetracyclines & Related GI Intolerance   • Sulfa Antibiotics GI Intolerance     Past Surgical History:   Procedure Laterality Date   • COLONOSCOPY  2008   • ENDOSCOPY  2004   • HYSTERECTOMY  1998    FOR FIBROIDS. BOTH ovaries removed      Social History     Socioeconomic History   • Marital status:    Tobacco Use   • Smoking status: Never Smoker   • Smokeless tobacco: Never Used   • Tobacco comment: Grew up exposed to second hand smoke   Vaping Use   • Vaping Use: Never used   Substance and Sexual Activity   • Alcohol use: Never   • Drug use: Never   • Sexual activity: Defer     Family History   Problem Relation Age of Onset   • Diabetes Father    • Hypertension Mother    • Lung cancer Brother        Current Outpatient Medications:   •  ALBUTEROL SULFATE IN, Inhale., Disp: , Rfl:   •  amLODIPine (NORVASC) 2.5 MG tablet, Take 2.5 mg by mouth Daily., Disp: , Rfl:   •  azithromycin (ZITHROMAX) 250 MG tablet, , Disp: , Rfl:   •  Budesonide-Formoterol Fumarate (SYMBICORT IN), Inhale 1 puff As Needed., Disp: , Rfl:   •  cetirizine (zyrTEC) 10 MG tablet, Take 1 tablet by mouth Daily., Disp: 90 tablet, Rfl: 1  •  Chlorpheniramine Maleate (CHLORPHEN SR PO), Take 10 mg by  mouth Daily., Disp: , Rfl:   •  Cholecalciferol (Vitamin D) 50 MCG (2000 UT) tablet, Take 2,000 Units by mouth Daily., Disp: , Rfl:   •  cycloSPORINE (RESTASIS) 0.05 % ophthalmic emulsion, 1 drop 2 (Two) Times a Day., Disp: , Rfl:   •  fluticasone (FLONASE) 50 MCG/ACT nasal spray, , Disp: , Rfl:   •  hydroCHLOROthiazide (HYDRODIURIL) 12.5 MG tablet, Take 12.5 mg by mouth Daily., Disp: , Rfl:   •  hydroxychloroquine (PLAQUENIL) 200 MG tablet, Take 200 mg by mouth Daily., Disp: , Rfl:   •  Lubricating Plus Eye Drops 0.5 % solution, Administer 1 drop to both eyes Daily., Disp: , Rfl:   •  metFORMIN ER (GLUCOPHAGE-XR) 500 MG 24 hr tablet, Take 1 tablet by mouth Daily With Breakfast for 30 days., Disp: 30 tablet, Rfl: 1  •  metoprolol succinate XL (TOPROL-XL) 25 MG 24 hr tablet, Take 25 mg by mouth Daily., Disp: , Rfl:   •  mometasone (Asmanex, 120 Metered Doses,) 220 MCG/INH inhaler, Inhale 2 puffs Every Night., Disp: , Rfl:   •  mometasone (NASONEX) 50 MCG/ACT nasal spray, 2 sprays into the nostril(s) as directed by provider As Needed., Disp: , Rfl:   •  montelukast (Singulair) 10 MG tablet, Take 1 tablet by mouth Daily., Disp: 90 tablet, Rfl: 1  •  Omega 3-6-9 Fatty Acids (OMEGA 3-6-9 PO), Take 1 capsule by mouth Daily., Disp: , Rfl:   •  omeprazole (priLOSEC) 20 MG capsule, Take 1 capsule by mouth 2 (Two) Times a Day., Disp: 180 capsule, Rfl: 1  •  predniSONE (DELTASONE) 20 MG tablet, , Disp: , Rfl:   •  Probiotic Product (PROBIOTIC-10 PO), Take  by mouth Daily., Disp: , Rfl:   •  pseudoephedrine (SUDAFED) 30 MG tablet, Take 2 tablets by mouth As Needed., Disp: , Rfl:

## 2022-04-22 ENCOUNTER — LAB (OUTPATIENT)
Dept: LAB | Facility: HOSPITAL | Age: 72
End: 2022-04-22

## 2022-04-22 DIAGNOSIS — J45.909 PERSISTENT ASTHMA WITHOUT COMPLICATION, UNSPECIFIED ASTHMA SEVERITY: ICD-10-CM

## 2022-04-22 DIAGNOSIS — M32.13 SYSTEMIC LUPUS ERYTHEMATOSUS WITH LUNG INVOLVEMENT, UNSPECIFIED SLE TYPE: ICD-10-CM

## 2022-04-22 DIAGNOSIS — R05.9 COUGH: ICD-10-CM

## 2022-04-22 LAB
BASOPHILS # BLD AUTO: 0.04 10*3/MM3 (ref 0–0.2)
BASOPHILS NFR BLD AUTO: 0.8 % (ref 0–1.5)
DEPRECATED RDW RBC AUTO: 41.8 FL (ref 37–54)
EOSINOPHIL # BLD AUTO: 0.12 10*3/MM3 (ref 0–0.4)
EOSINOPHIL NFR BLD AUTO: 2.5 % (ref 0.3–6.2)
ERYTHROCYTE [DISTWIDTH] IN BLOOD BY AUTOMATED COUNT: 12.9 % (ref 12.3–15.4)
HCT VFR BLD AUTO: 40.2 % (ref 34–46.6)
HGB BLD-MCNC: 13 G/DL (ref 12–15.9)
IMM GRANULOCYTES # BLD AUTO: 0.01 10*3/MM3 (ref 0–0.05)
IMM GRANULOCYTES NFR BLD AUTO: 0.2 % (ref 0–0.5)
LYMPHOCYTES # BLD AUTO: 2.22 10*3/MM3 (ref 0.7–3.1)
LYMPHOCYTES NFR BLD AUTO: 46.2 % (ref 19.6–45.3)
MCH RBC QN AUTO: 28.5 PG (ref 26.6–33)
MCHC RBC AUTO-ENTMCNC: 32.3 G/DL (ref 31.5–35.7)
MCV RBC AUTO: 88.2 FL (ref 79–97)
MONOCYTES # BLD AUTO: 0.44 10*3/MM3 (ref 0.1–0.9)
MONOCYTES NFR BLD AUTO: 9.1 % (ref 5–12)
NEUTROPHILS NFR BLD AUTO: 1.98 10*3/MM3 (ref 1.7–7)
NEUTROPHILS NFR BLD AUTO: 41.2 % (ref 42.7–76)
NRBC BLD AUTO-RTO: 0 /100 WBC (ref 0–0.2)
PLATELET # BLD AUTO: 243 10*3/MM3 (ref 140–450)
PMV BLD AUTO: 10.4 FL (ref 6–12)
RBC # BLD AUTO: 4.56 10*6/MM3 (ref 3.77–5.28)
WBC NRBC COR # BLD: 4.81 10*3/MM3 (ref 3.4–10.8)

## 2022-04-22 PROCEDURE — 36415 COLL VENOUS BLD VENIPUNCTURE: CPT

## 2022-04-22 PROCEDURE — 85025 COMPLETE CBC W/AUTO DIFF WBC: CPT

## 2022-04-22 PROCEDURE — 82785 ASSAY OF IGE: CPT

## 2022-04-25 LAB — IGE SERPL-ACNC: 34.8 KU/L

## 2022-05-02 ENCOUNTER — OFFICE VISIT (OUTPATIENT)
Dept: INTERNAL MEDICINE | Facility: CLINIC | Age: 72
End: 2022-05-02

## 2022-05-02 VITALS
HEIGHT: 65 IN | OXYGEN SATURATION: 100 % | HEART RATE: 63 BPM | SYSTOLIC BLOOD PRESSURE: 138 MMHG | DIASTOLIC BLOOD PRESSURE: 86 MMHG | BODY MASS INDEX: 33.99 KG/M2 | WEIGHT: 204 LBS | TEMPERATURE: 97.5 F

## 2022-05-02 DIAGNOSIS — J01.10 ACUTE NON-RECURRENT FRONTAL SINUSITIS: Primary | ICD-10-CM

## 2022-05-02 PROCEDURE — 99213 OFFICE O/P EST LOW 20 MIN: CPT | Performed by: PHYSICIAN ASSISTANT

## 2022-05-02 RX ORDER — AMOXICILLIN AND CLAVULANATE POTASSIUM 875; 125 MG/1; MG/1
1 TABLET, FILM COATED ORAL 2 TIMES DAILY
Qty: 20 TABLET | Refills: 0 | Status: SHIPPED | OUTPATIENT
Start: 2022-05-02 | End: 2022-05-12

## 2022-05-02 NOTE — PROGRESS NOTES
"Chief Complaint  Earache (Both ears/)    Subjective          Yuliana Da Silva presents to [facility] MEDIC[facility]ED[facility]ATRICS  Ear fullness, sinus pressure and congestion-  Symptoms started last week.  She has been taking chloracidine and tylenol, zyrtec and singulair. No fevers.  No sick contacts.        Objective   Vital Signs:   /86 (BP Location: Left arm, Patient Position: Sitting, Cuff Size: Adult)   Pulse 63   Temp 97.5 °F (36.4 °C) (Temporal)   Ht 165.1 cm (65\")   Wt 92.5 kg (204 lb)   SpO2 100%   BMI 33.95 kg/m²     Physical Exam  Vitals reviewed.   Constitutional:       Appearance: Normal appearance. She is well-developed.   HENT:      Head: Normocephalic and atraumatic.      Comments: Bilateral frontal sinus tenderness     Right Ear: Tympanic membrane, ear canal and external ear normal.      Left Ear: Tympanic membrane, ear canal and external ear normal.      Mouth/Throat:      Pharynx: No oropharyngeal exudate.   Eyes:      Conjunctiva/sclera: Conjunctivae normal.      Pupils: Pupils are equal, round, and reactive to light.   Cardiovascular:      Rate and Rhythm: Normal rate and regular rhythm.      Heart sounds: No murmur heard.    No friction rub. No gallop.   Pulmonary:      Effort: Pulmonary effort is normal.      Breath sounds: Normal breath sounds. No wheezing or rhonchi.   Skin:     General: Skin is warm and dry.   Neurological:      Mental Status: She is alert and oriented to person, place, and time.      Cranial Nerves: No cranial nerve deficit.   Psychiatric:         Mood and Affect: Mood and affect normal.         Behavior: Behavior normal.         Thought Content: Thought content normal.         Judgment: Judgment normal.        Result Review :          Procedures      Assessment and Plan    Diagnoses and all orders for this visit:    1. Acute non-recurrent frontal sinusitis (Primary)  Assessment & Plan:  Due to duration of symptoms and physical exam " findings will treat with Augmentin due to concern of bacterial rhinosinusitis.  Okay to continue conservative treatment as well with Mucinex, Flonase and ibuprofen as needed.  If symptoms persist or worsen patient needs to return.      Other orders  -     amoxicillin-clavulanate (Augmentin) 875-125 MG per tablet; Take 1 tablet by mouth 2 (Two) Times a Day for 10 days.  Dispense: 20 tablet; Refill: 0            Follow Up   Return if symptoms worsen or fail to improve.  Patient was given instructions and counseling regarding her condition or for health maintenance advice. Please see specific information pulled into the AVS if appropriate.

## 2022-05-02 NOTE — ASSESSMENT & PLAN NOTE
Due to duration of symptoms and physical exam findings will treat with Augmentin due to concern of bacterial rhinosinusitis.  Okay to continue conservative treatment as well with Mucinex, Flonase and ibuprofen as needed.  If symptoms persist or worsen patient needs to return.

## 2022-05-18 DIAGNOSIS — R73.03 PRE-DIABETES: ICD-10-CM

## 2022-05-18 RX ORDER — METFORMIN HYDROCHLORIDE 500 MG/1
500 TABLET, EXTENDED RELEASE ORAL
Qty: 90 TABLET | Refills: 1 | Status: SHIPPED | OUTPATIENT
Start: 2022-05-18 | End: 2022-09-22 | Stop reason: SDUPTHER

## 2022-05-18 NOTE — TELEPHONE ENCOUNTER
Caller: Yuliana Da Silva    Relationship: Self    Best call back number: [phone]    Requested Prescriptions:   Requested Prescriptions     Pending Prescriptions Disp Refills   • metFORMIN ER (GLUCOPHAGE-XR) 500 MG 24 hr tablet 30 tablet 1     Sig: Take 1 tablet by mouth Daily With Breakfast for 30 days.        [facility]  [place] [facility] [address] [phone] [facility] [phone] FX     THE PATIENT WOULD LIKE A CALL BACK TO CONFIRM THIS HAS BEEN SENT TO THE PHARMACY. A DETAILED VOICEMAIL CAN BE LEFT IF SHE CANNOT ANSWER    Does the patient have less than a 3 day supply:  [] Yes  [x] No    Delores Carmichael Rep   05/18/22 10:42 EDT

## 2022-05-20 ENCOUNTER — HOSPITAL ENCOUNTER (OUTPATIENT)
Dept: CT IMAGING | Facility: HOSPITAL | Age: 72
Discharge: HOME | End: 2022-05-20
Admitting: INTERNAL MEDICINE

## 2022-05-20 DIAGNOSIS — R05.9 COUGH: ICD-10-CM

## 2022-05-20 DIAGNOSIS — M32.13 SYSTEMIC LUPUS ERYTHEMATOSUS WITH LUNG INVOLVEMENT, UNSPECIFIED SLE TYPE: ICD-10-CM

## 2022-05-20 PROCEDURE — 71250 CT THORAX DX C-: CPT

## 2022-05-23 ENCOUNTER — OFFICE VISIT (OUTPATIENT)
Dept: INTERNAL MEDICINE | Facility: CLINIC | Age: 72
End: 2022-05-23

## 2022-05-23 VITALS
TEMPERATURE: 97.3 F | WEIGHT: 202 LBS | BODY MASS INDEX: 33.66 KG/M2 | HEIGHT: 65 IN | SYSTOLIC BLOOD PRESSURE: 126 MMHG | HEART RATE: 80 BPM | OXYGEN SATURATION: 100 % | DIASTOLIC BLOOD PRESSURE: 72 MMHG

## 2022-05-23 DIAGNOSIS — J30.1 SEASONAL ALLERGIC RHINITIS DUE TO POLLEN: ICD-10-CM

## 2022-05-23 DIAGNOSIS — R73.03 PRE-DIABETES: Primary | ICD-10-CM

## 2022-05-23 PROCEDURE — 99213 OFFICE O/P EST LOW 20 MIN: CPT | Performed by: PHYSICIAN ASSISTANT

## 2022-05-23 NOTE — PROGRESS NOTES
"Chief Complaint  Sinusitis    Subjective          Yuliana Da Silva presents to [facility] MEDICINE [facility] PED[facility]ATRICS  Pre-diabetes: doing well on metformin.  Has not experienced any side effects that she is aware of.  Patient has not lost much weight but is interested to see how her a1c is running.  Patient would like to try ozempic to help with weight loss but due to her history of pancreatitis we will hold off on that for now.     Seasonal allergies- is not able to get allergy shots while taking Metoprolol.  She has appointment with cardiologist this week and is going to discuss this further with him as she has been experiencing significant allergy symptoms the past month.       Objective   Vital Signs:   /72 (BP Location: Left arm, Patient Position: Sitting, Cuff Size: Adult)   Pulse 80   Temp 97.3 °F (36.3 °C) (Temporal)   Ht 165.1 cm (65\")   Wt 91.6 kg (202 lb)   SpO2 100%   BMI 33.61 kg/m²     Physical Exam  Vitals reviewed.   Constitutional:       Appearance: Normal appearance. She is well-developed.   HENT:      Head: Normocephalic and atraumatic.   Eyes:      Conjunctiva/sclera: Conjunctivae normal.      Pupils: Pupils are equal, round, and reactive to light.   Cardiovascular:      Rate and Rhythm: Normal rate and regular rhythm.      Heart sounds: No murmur heard.    No friction rub. No gallop.   Pulmonary:      Effort: Pulmonary effort is normal.      Breath sounds: Normal breath sounds. No wheezing or rhonchi.   Musculoskeletal:      Right lower leg: No edema.      Left lower leg: No edema.   Skin:     General: Skin is warm and dry.   Neurological:      Mental Status: She is alert and oriented to person, place, and time.      Cranial Nerves: No cranial nerve deficit.   Psychiatric:         Mood and Affect: Mood and affect normal.         Behavior: Behavior normal.         Thought Content: Thought content normal.         Judgment: Judgment normal.        Result " Review :          Procedures      Assessment and Plan    Diagnoses and all orders for this visit:    1. Pre-diabetes (Primary)  Assessment & Plan:  Tolerating metformin.  Will continue this for now while working on diet and exercise.  Discussed with patient to try to cut back on sweets daily.  Will have patient follow up with Dr. Betts to review labs and discuss other medications to help with glucose control and possible weight loss.        2. Seasonal allergic rhinitis due to pollen  Assessment & Plan:  Patient is to discuss risk vs. Benefits with cardiologist and allergist about allergy shots and metoprolol.  Call for any questions or concerns.               Follow Up   Return in about 3 months (around 8/23/2022), or recheck with Dr. Betts.  Patient was given instructions and counseling regarding her condition or for health maintenance advice. Please see specific information pulled into the AVS if appropriate.

## 2022-05-23 NOTE — ASSESSMENT & PLAN NOTE
Patient is to discuss risk vs. Benefits with cardiologist and allergist about allergy shots and metoprolol.  Call for any questions or concerns.

## 2022-05-23 NOTE — ASSESSMENT & PLAN NOTE
Tolerating metformin.  Will continue this for now while working on diet and exercise.  Discussed with patient to try to cut back on sweets daily.  Will have patient follow up with Dr. Betts to review labs and discuss other medications to help with glucose control and possible weight loss.

## 2022-05-31 ENCOUNTER — OFFICE VISIT (OUTPATIENT)
Dept: PULMONOLOGY | Facility: CLINIC | Age: 72
End: 2022-05-31

## 2022-05-31 VITALS
DIASTOLIC BLOOD PRESSURE: 75 MMHG | SYSTOLIC BLOOD PRESSURE: 116 MMHG | OXYGEN SATURATION: 98 % | WEIGHT: 201 LBS | HEART RATE: 86 BPM | BODY MASS INDEX: 33.49 KG/M2 | RESPIRATION RATE: 19 BRPM | TEMPERATURE: 97.8 F | HEIGHT: 65 IN

## 2022-05-31 DIAGNOSIS — J45.909 PERSISTENT ASTHMA WITHOUT COMPLICATION, UNSPECIFIED ASTHMA SEVERITY: Primary | ICD-10-CM

## 2022-05-31 PROCEDURE — 99213 OFFICE O/P EST LOW 20 MIN: CPT | Performed by: INTERNAL MEDICINE

## 2022-05-31 RX ORDER — BUDESONIDE AND FORMOTEROL FUMARATE DIHYDRATE 160; 4.5 UG/1; UG/1
2 AEROSOL RESPIRATORY (INHALATION) AS NEEDED
Qty: 1 EACH | Refills: 12 | Status: SHIPPED | OUTPATIENT
Start: 2022-05-31

## 2022-05-31 NOTE — PROGRESS NOTES
Primary Care Provider  Elicia Betts MD   Referring Provider  No ref. provider found      Patient Complaint  No chief complaint on file.      Subjective          Yuliana Da Silva presents to [facility]      History of Presenting Illness  Yuliana Da Silva is a 71 y.o. pleasant -American lady presents to [facility] for follow-up for asthma.  The patient was seen last about 6 weeks ago for chronic cough felt to be asthma related but because of history of SLE lupus related ILD needed to be ruled out.  The patient comes to the clinic today after undergoing blood tests and high-resolution CT scan of the chest.  PFT is still pending and is scheduled for August 18.  The patient was instructed to up her Symbicort dose to 2 puffs twice daily but unfortunately she did not as there was some issue with her prescription and she waited to be seen today.  As a result the patient has been complaining of cough as in the previous visit with dyspnea on exertion and frequent nighttime wheezing.  There is no history of fever, chills, night sweats, hemoptysis.      I have personally reviewed the review of systems, medication, past , family, social, medical and surgical histories; and agree with their findings.      Review of Systems  Constitutional symptoms:  Denied complaints   Ear, nose, throat: Denied complaints  Cardiovascular:  Denied complaints  Respiratory: see above  Gastrointestinal: Denied complaints  Musculoskeletal: Denied complaints        Family History   Problem Relation Age of Onset   • Diabetes Father    • Hypertension Mother    • Lung cancer Brother         Social History     Socioeconomic History   • Marital status:    Tobacco Use   • Smoking status: Never Smoker   • Smokeless tobacco: Never Used   • Tobacco comment: Grew up exposed to second hand smoke   Vaping Use   • Vaping  Use: Never used   Substance and Sexual Activity   • Alcohol use: Never   • Drug use: Never   • Sexual activity: Defer        Past Medical History:   Diagnosis Date   • Allergic rhinitis 06/04/2013   • Anemia    • Asthma     W/ ACUTE EXACERBATION   • Atypical chest pain    • Cataract    • Cough variant asthma 06/09/2014   • Gastroesophageal reflux disease    • Headache    • Hiatal hernia    • HTN (hypertension)    • Impaired fasting glucose    • Insomnia    • Leukocytopenia    • Lupus (HCC)    • Palpitations    • Pancreatitis     W/ STENT PLACEMENT   • Screening for breast cancer 11/2018    WNL   • SOB (shortness of breath)         Immunization History   Administered Date(s) Administered   • COVID-19 (MODERNA) 1st, 2nd, 3rd Dose Only 02/24/2021   • Fluzone High Dose =>65 Years ([employer] ONLY) 09/01/2021   • Fluzone High-Dose 65+yrs 09/09/2020   • Hepatitis A 04/26/2018   • Influenza TIV (IM) 09/27/2013, 09/26/2017, 09/30/2019   • Pneumococcal Conjugate 13-Valent (PCV13) 10/03/2019   • Pneumococcal Polysaccharide (PPSV23) 11/22/2013         Allergies   Allergen Reactions   • Codeine Hives   • Levofloxacin Hives and Diarrhea   • Sulfamethoxazole-Trimethoprim Diarrhea   • Tetracycline Hives   • Tetracyclines & Related GI Intolerance   • Sulfa Antibiotics GI Intolerance          Current Outpatient Medications:   •  ALBUTEROL SULFATE IN, Inhale., Disp: , Rfl:   •  amLODIPine (NORVASC) 2.5 MG tablet, Take 2.5 mg by mouth Daily., Disp: , Rfl:   •  Budesonide-Formoterol Fumarate (SYMBICORT IN), Inhale 1 puff As Needed., Disp: , Rfl:   •  cetirizine (zyrTEC) 10 MG tablet, Take 1 tablet by mouth Daily., Disp: 90 tablet, Rfl: 1  •  Chlorpheniramine Maleate (CHLORPHEN SR PO), Take 10 mg by mouth Daily., Disp: , Rfl:   •  Cholecalciferol (Vitamin D) 50 MCG (2000 UT) tablet, Take 2,000 Units by mouth Daily., Disp: , Rfl:   •  cycloSPORINE (RESTASIS) 0.05 % ophthalmic emulsion, 1 drop 2 (Two) Times a Day., Disp: , Rfl:   •   fluticasone (FLONASE) 50 MCG/ACT nasal spray, , Disp: , Rfl:   •  hydroCHLOROthiazide (HYDRODIURIL) 12.5 MG tablet, Take 12.5 mg by mouth Daily., Disp: , Rfl:   •  hydroxychloroquine (PLAQUENIL) 200 MG tablet, Take 200 mg by mouth Daily., Disp: , Rfl:   •  Lubricating Plus Eye Drops 0.5 % solution, Administer 1 drop to both eyes Daily., Disp: , Rfl:   •  metFORMIN ER (GLUCOPHAGE-XR) 500 MG 24 hr tablet, Take 1 tablet by mouth Daily With Breakfast for 30 days., Disp: 90 tablet, Rfl: 1  •  metoprolol succinate XL (TOPROL-XL) 25 MG 24 hr tablet, Take 25 mg by mouth Daily., Disp: , Rfl:   •  mometasone (NASONEX) 50 MCG/ACT nasal spray, 2 sprays into the nostril(s) as directed by provider As Needed., Disp: , Rfl:   •  montelukast (Singulair) 10 MG tablet, Take 1 tablet by mouth Daily., Disp: 90 tablet, Rfl: 1  •  Omega 3-6-9 Fatty Acids (OMEGA 3-6-9 PO), Take 1 capsule by mouth Daily., Disp: , Rfl:   •  omeprazole (priLOSEC) 20 MG capsule, Take 1 capsule by mouth 2 (Two) Times a Day., Disp: 180 capsule, Rfl: 1  •  PEG-KCl-NaCl-NaSulf-Na Asc-C (MoviPrep) 100 g reconstituted solution powder, Take 1,000 mL by mouth Take As Directed. Take as directed by prescriber's office, Disp: 1000 mL, Rfl: 0  •  predniSONE (DELTASONE) 20 MG tablet, , Disp: , Rfl:   •  Probiotic Product (PROBIOTIC-10 PO), Take  by mouth Daily., Disp: , Rfl:          Objective     Vital Signs:   There were no vitals taken for this visit.    Physical Exam  Vital Signs Reviewed   WDWN, Alert, NAD.    HEENT:  PERRL, EOMI.  OP, nares clear  Neck:  Supple, no JVD, no masses  Chest:  good aeration, clear to auscultation bilaterally, resonant to percussion bilaterally, no work of breathing noted  CV: RRR, no MGR, pulses 2+, equal.  Abd:  Soft, NT, ND, + BS  EXT:  no clubbing, no cyanosis, no edema  Neuro:  A&Ox3, CN grossly intact, no focal deficits.  Skin: No rashes or lesions noted       Result Review :   I have personally reviewed the the patient's medical  records including provider notes, labs, imaging reports and films.        CT Chest Hi Resolution Diagnostic    Result Date: 5/20/2022  PROCEDURE: CT CHEST HI RESOLUTION DIAGNOSTIC  COMPARISON: [facility], CT, CHEST W/O CONTRAST, 1/17/2020, 11:05.  INDICATIONS: INTERSTITAL LUNG DISEASE. CHRONIC COUGH. NON SMOKER.  TECHNIQUE: CT images were created without the administration of contrast material.   PROTOCOL:   High Resolution Protocol    RADIATION:   DLP: 1149.8mGy*cm   Automated exposure control was utilized to minimize radiation dose.  FINDINGS:  The central tracheobronchial tree is clear.  The lungs are clear.  No significant emphysema or interstitial lung disease is identified.  There is no pleural effusion.  The heart size appears normal.  The great vessels are normal in caliber.  No abnormally enlarged lymph nodes are identified.  Partial evaluation of the upper abdomen is unremarkable.  No aggressive osseous lesions are identified.      Impression:   1. No acute cardiopulmonary process. 2. No significant interstitial lung disease identified.     OMID CARRANZA MD       Electronically Signed and Approved By: OMID CARRANZA MD on 5/20/2022 at 12:13              Pulmonary Functions Testing Results:    Scheduled for 8/18/2022       Assessment and Plan      Patient Active Problem List   Diagnosis   • Essential hypertension   • Impaired fasting glucose   • Weight gain   • Insomnia   • Cough   • Pancreatitis   • Pre-diabetes   • Colon cancer screening   • Acute non-recurrent frontal sinusitis   • Allergic rhinitis           Impression and Plan:  Mrs. Da Silva is a 71-year-old lady with history of asthma not controlled off Symbicort for the past few weeks.  Blood work-up showed no eosinophilia with borderline minimal elevation of IgE and high-resolution CT scan of the chest that ruled out interstitial lung disease considering the patient's history of SLE.  Plan: Symbicort was represcribed to  another pharmacy 2 puffs twice daily.  The patient will return to the clinic in late August to evaluate response and to go over the PFT results.        Follow Up     Patient was given instructions and counseling regarding her condition or for health maintenance advice. Please see specific information pulled into the AVS if appropriate.

## 2022-06-22 NOTE — PROGRESS NOTES
[facility]  Cardiology progress Note    Patient Name: Yuliana Da Silva  : 1950    CHIEF COMPLAINT  Palpitations      Subjective   Subjective     HISTORY OF PRESENT ILLNESS    Yuliana Da Silva is a 71 y.o. female with palpitations and sinus tachycardia.  No chest pain or shortness of breath.    Review of Systems:   Constitutional no fever,  no weight loss   Skin no rash   Otolaryngeal no difficulty swallowing   Cardiovascular See HPI   Pulmonary no cough, no sputum production   Gastrointestinal no constipation, no diarrhea   Genitourinary no dysuria, no hematuria   Hematologic no easy bruisability, no abnormal bleeding   Musculoskeletal no muscle pain   Neurologic no dizziness, no falls         Personal History     Social History:  reports that she has never smoked. She has never used smokeless tobacco. She reports that she does not drink alcohol and does not use drugs.    Home Medications:  Current Outpatient Medications on File Prior to Visit   Medication Sig   • ALBUTEROL SULFATE IN Inhale.   • amLODIPine (NORVASC) 2.5 MG tablet Take 2.5 mg by mouth Daily.   • budesonide-formoterol (Symbicort) 160-4.5 MCG/ACT inhaler Inhale 2 puffs As Needed (Cough).   • cetirizine (zyrTEC) 10 MG tablet Take 1 tablet by mouth Daily.   • Chlorpheniramine Maleate (CHLORPHEN SR PO) Take 10 mg by mouth Daily.   • Cholecalciferol (Vitamin D) 50 MCG (2000 UT) tablet Take 2,000 Units by mouth Daily.   • cycloSPORINE (RESTASIS) 0.05 % ophthalmic emulsion 1 drop 2 (Two) Times a Day.   • fluticasone (FLONASE) 50 MCG/ACT nasal spray    • hydroCHLOROthiazide (HYDRODIURIL) 12.5 MG tablet Take 12.5 mg by mouth Daily.   • hydroxychloroquine (PLAQUENIL) 200 MG tablet Take 200 mg by mouth Daily.   • Lubricating Plus Eye Drops 0.5 % solution Administer 1 drop to both eyes Daily.   • metFORMIN ER (GLUCOPHAGE-XR) 500 MG 24 hr tablet Take 1 tablet by mouth Daily With Breakfast for 30 days.   • metoprolol succinate XL  (TOPROL-XL) 25 MG 24 hr tablet Take 25 mg by mouth Daily.   • mometasone (NASONEX) 50 MCG/ACT nasal spray 2 sprays into the nostril(s) as directed by provider As Needed.   • montelukast (Singulair) 10 MG tablet Take 1 tablet by mouth Daily.   • Omega 3-6-9 Fatty Acids (OMEGA 3-6-9 PO) Take 1 capsule by mouth Daily.   • omeprazole (priLOSEC) 20 MG capsule Take 1 capsule by mouth 2 (Two) Times a Day.   • PEG-KCl-NaCl-NaSulf-Na Asc-C (MoviPrep) 100 g reconstituted solution powder Take 1,000 mL by mouth Take As Directed. Take as directed by prescriber's office   • predniSONE (DELTASONE) 20 MG tablet    • Probiotic Product (PROBIOTIC-10 PO) Take  by mouth Daily.     No current facility-administered medications on file prior to visit.     Allergies:  Allergies   Allergen Reactions   • Codeine Hives   • Levofloxacin Hives and Diarrhea   • Sulfamethoxazole-Trimethoprim Diarrhea   • Tetracycline Hives   • Tetracyclines & Related GI Intolerance   • Sulfa Antibiotics GI Intolerance       Objective    Objective       Vitals:   Heart Rate:  [76] 76  BP: (138)/(70) 138/70  Body mass index is 32.95 kg/m².     Physical Exam:   Constitutional: Awake, alert, No acute distress    Eyes: PERRLA, sclerae anicteric, no conjunctival injection   HENT: NCAT, mucous membranes moist   Neck: Supple, no thyromegaly, no lymphadenopathy, trachea midline   Respiratory: Clear to auscultation bilaterally, nonlabored respirations    Cardiovascular: RRR, no murmurs or rubs. Palpable pedal pulses bilaterally   Musculoskeletal: No bilateral ankle edema, no cyanosis to extremities   Psychiatric: Appropriate affect, cooperative   Neurologic: Oriented x 3, strength symmetric in all extremities, Cranial Nerves grossly intact to confrontation, speech clear   Skin: No rashes.    Result Review    Result Review:  I have personally reviewed the available results from  [x]  Laboratory  [x]  EKG  [x]  Cardiology  [x]  Medications  [x]  Old records  []  Other:    Procedures  Lab Results   Component Value Date    CHOL 203 (H) 02/28/2022    CHOL 191 08/27/2021     Lab Results   Component Value Date    TRIG 54 02/28/2022    TRIG 88 08/27/2021    TRIG 55 03/19/2021     Lab Results   Component Value Date    HDL 89 (H) 02/28/2022    HDL 76 (H) 08/27/2021    HDL 82 (H) 03/19/2021     Lab Results   Component Value Date     (H) 02/28/2022    LDL 99 08/27/2021    LDL 89 03/19/2021     Lab Results   Component Value Date    VLDL 10 02/28/2022    VLDL 16 08/27/2021    VLDL 11 03/19/2021        Impression/Plan:   1.  Palpitations/sinus tachycardia improved: Continue Toprol-XL 25 mg once a day.  2.  Precordial atypical chest pain: Negative stress test.  3.  Hypertension: Continue amlodipine 2.5 mg once a day.  Blood pressure controlled at home.  4.  Hyperlipidemia: Lipid profile is reviewed.        Toby Lin MD   06/24/22   10:59 EDT

## 2022-06-24 ENCOUNTER — OFFICE VISIT (OUTPATIENT)
Dept: CARDIOLOGY | Facility: CLINIC | Age: 72
End: 2022-06-24

## 2022-06-24 VITALS
HEIGHT: 65 IN | HEART RATE: 76 BPM | SYSTOLIC BLOOD PRESSURE: 138 MMHG | DIASTOLIC BLOOD PRESSURE: 70 MMHG | BODY MASS INDEX: 32.99 KG/M2 | WEIGHT: 198 LBS

## 2022-06-24 DIAGNOSIS — E78.2 HYPERLIPEMIA, MIXED: ICD-10-CM

## 2022-06-24 DIAGNOSIS — I10 HYPERTENSION, ESSENTIAL: ICD-10-CM

## 2022-06-24 DIAGNOSIS — R00.2 PALPITATIONS: Primary | ICD-10-CM

## 2022-06-24 PROCEDURE — 99214 OFFICE O/P EST MOD 30 MIN: CPT | Performed by: SPECIALIST

## 2022-07-19 NOTE — TELEPHONE ENCOUNTER
Caller: Yuliana Da Silva    Relationship: Self    Best call back number: [phone]    Requested Prescriptions:   Requested Prescriptions     Pending Prescriptions Disp Refills   • montelukast (Singulair) 10 MG tablet 90 tablet 1     Sig: Take 1 tablet by mouth Daily.        Pharmacy where request should be sent: [facility] [place], [place]  [address] [facility] - [phone] [facility] [phone] FX     Additional details provided by patient: PATIENT STATES TO PLEASE CALL WHEN THE REFILL IS SENT.    Does the patient have less than a 3 day supply:  [] Yes  [x] No    Delores NAZARIO Rep   07/19/22 10:08 EDT

## 2022-07-20 RX ORDER — MONTELUKAST SODIUM 10 MG/1
10 TABLET ORAL DAILY
Qty: 90 TABLET | Refills: 1 | Status: SHIPPED | OUTPATIENT
Start: 2022-07-20

## 2022-08-02 PROCEDURE — 82962 GLUCOSE BLOOD TEST: CPT

## 2022-08-15 ENCOUNTER — TELEPHONE (OUTPATIENT)
Dept: GASTROENTEROLOGY | Facility: CLINIC | Age: 72
End: 2022-08-15

## 2022-08-18 ENCOUNTER — APPOINTMENT (OUTPATIENT)
Dept: RESPIRATORY THERAPY | Facility: HOSPITAL | Age: 72
End: 2022-08-18

## 2022-08-18 RX ORDER — CETIRIZINE HYDROCHLORIDE 10 MG/1
10 TABLET ORAL DAILY
Qty: 90 TABLET | Refills: 1 | Status: SHIPPED | OUTPATIENT
Start: 2022-08-18 | End: 2023-03-02 | Stop reason: SDUPTHER

## 2022-08-18 NOTE — TELEPHONE ENCOUNTER
Caller: Yuliana Da Silva    Relationship: Self    Best call back number: [phone]    Requested Prescriptions:   Requested Prescriptions     Pending Prescriptions Disp Refills   • cetirizine (zyrTEC) 10 MG tablet 90 tablet 1     Sig: Take 1 tablet by mouth Daily.      Pharmacy where request should be sent:    [facility]  [address] [place], [place] [address] [phone]    Additional details provided by patient: PLEASE CALL PATIENT WHEN THE REFILL HAS BEEN SENT TO THE PHARMACY.    Does the patient have less than a 3 day supply:  [] Yes  [x] No    Delores Betts Rep   08/18/22 12:02 EDT

## 2022-08-19 ENCOUNTER — HOSPITAL ENCOUNTER (OUTPATIENT)
Dept: RESPIRATORY THERAPY | Facility: HOSPITAL | Age: 72
Discharge: HOME | End: 2022-08-19
Admitting: INTERNAL MEDICINE

## 2022-08-19 DIAGNOSIS — M32.13 SYSTEMIC LUPUS ERYTHEMATOSUS WITH LUNG INVOLVEMENT, UNSPECIFIED SLE TYPE: ICD-10-CM

## 2022-08-19 DIAGNOSIS — J45.909 PERSISTENT ASTHMA WITHOUT COMPLICATION, UNSPECIFIED ASTHMA SEVERITY: Primary | ICD-10-CM

## 2022-08-19 DIAGNOSIS — J45.909 PERSISTENT ASTHMA WITHOUT COMPLICATION, UNSPECIFIED ASTHMA SEVERITY: ICD-10-CM

## 2022-08-19 DIAGNOSIS — R05.9 COUGH: ICD-10-CM

## 2022-08-19 PROCEDURE — 94729 DIFFUSING CAPACITY: CPT

## 2022-08-19 PROCEDURE — 94060 EVALUATION OF WHEEZING: CPT

## 2022-08-19 PROCEDURE — 94726 PLETHYSMOGRAPHY LUNG VOLUMES: CPT | Performed by: INTERNAL MEDICINE

## 2022-08-19 PROCEDURE — 94729 DIFFUSING CAPACITY: CPT | Performed by: INTERNAL MEDICINE

## 2022-08-19 PROCEDURE — 94726 PLETHYSMOGRAPHY LUNG VOLUMES: CPT

## 2022-08-19 PROCEDURE — 94060 EVALUATION OF WHEEZING: CPT | Performed by: INTERNAL MEDICINE

## 2022-08-19 RX ORDER — ALBUTEROL SULFATE 2.5 MG/3ML
2.5 SOLUTION RESPIRATORY (INHALATION) ONCE
Status: COMPLETED | OUTPATIENT
Start: 2022-08-19 | End: 2022-08-19

## 2022-08-19 RX ADMIN — ALBUTEROL SULFATE 2.5 MG: 2.5 SOLUTION RESPIRATORY (INHALATION) at 13:21

## 2022-08-19 NOTE — PROGRESS NOTES
The ABCs of the Annual Wellness Visit  Subsequent Medicare Wellness Visit    Chief Complaint   Patient presents with   • Diabetes     3 month   • Annual Exam     AWV      Subjective    History of Present Illness:  Yuliana Da Silva is a 71 y.o. female who presents for a Subsequent Medicare Wellness Visit.    The following portions of the patient's history were reviewed and   updated as appropriate: allergies, current medications, past family history, past medical history, past social history, past surgical history and problem list.    Compared to one year ago, the patient feels her physical   health is better.    Compared to one year ago, the patient feels her mental   health is better.    Recent Hospitalizations:  She was not admitted to the hospital during the last year.       Current Medical Providers:  Patient Care Team:  Elicia Betts MD as PCP - General (Pediatrics)    Outpatient Medications Prior to Visit   Medication Sig Dispense Refill   • ALBUTEROL SULFATE IN Inhale.     • amLODIPine (NORVASC) 2.5 MG tablet Take 2.5 mg by mouth Daily.     • budesonide-formoterol (Symbicort) 160-4.5 MCG/ACT inhaler Inhale 2 puffs As Needed (Cough). 1 each 12   • cetirizine (zyrTEC) 10 MG tablet Take 1 tablet by mouth Daily. 90 tablet 1   • Chlorpheniramine Maleate (CHLORPHEN SR PO) Take 10 mg by mouth Daily.     • Cholecalciferol (Vitamin D) 50 MCG (2000 UT) tablet Take 2,000 Units by mouth Daily.     • cycloSPORINE (RESTASIS) 0.05 % ophthalmic emulsion 1 drop 2 (Two) Times a Day.     • fluticasone (FLONASE) 50 MCG/ACT nasal spray      • hydroCHLOROthiazide (HYDRODIURIL) 12.5 MG tablet Take 12.5 mg by mouth Daily.     • hydroxychloroquine (PLAQUENIL) 200 MG tablet Take 200 mg by mouth Daily.     • Lubricating Plus Eye Drops 0.5 % solution Administer 1 drop to both eyes Daily.     • metFORMIN ER (GLUCOPHAGE-XR) 500 MG 24 hr tablet Take 1 tablet by mouth Daily With Breakfast for 30 days. 90 tablet 1   •  "metoprolol succinate XL (TOPROL-XL) 25 MG 24 hr tablet Take 25 mg by mouth Daily.     • mometasone (NASONEX) 50 MCG/ACT nasal spray 2 sprays into the nostril(s) as directed by provider As Needed.     • montelukast (Singulair) 10 MG tablet Take 1 tablet by mouth Daily. 90 tablet 1   • Omega 3-6-9 Fatty Acids (OMEGA 3-6-9 PO) Take 1 capsule by mouth Daily.     • omeprazole (priLOSEC) 20 MG capsule Take 1 capsule by mouth 2 (Two) Times a Day. 180 capsule 1   • PEG-KCl-NaCl-NaSulf-Na Asc-C (MoviPrep) 100 g reconstituted solution powder Take 1,000 mL by mouth Take As Directed. Take as directed by prescriber's office 1000 mL 0   • predniSONE (DELTASONE) 20 MG tablet Daily As Needed.     • Probiotic Product (PROBIOTIC-10 PO) Take  by mouth Daily.       No facility-administered medications prior to visit.       No opioid medication identified on active medication list. I have reviewed chart for other potential  high risk medication/s and harmful drug interactions in the elderly.          Aspirin is not on active medication list.  Aspirin use is not indicated based on review of current medical condition/s. Risk of harm outweighs potential benefits.  .    Patient Active Problem List   Diagnosis   • Essential hypertension   • Impaired fasting glucose   • Weight gain   • Insomnia   • Cough   • Pancreatitis   • Pre-diabetes   • Colon cancer screening   • Acute non-recurrent frontal sinusitis   • Allergic rhinitis     Advance Care Planning  Advance Directive is on file.  ACP discussion was held with the patient during this visit. Patient has an advance directive in EMR which is still valid.           Objective    Vitals:    08/24/22 1357   BP: 90/70   BP Location: Left arm   Patient Position: Sitting   Cuff Size: Adult   Pulse: 77   Resp: 12   Temp: 97.2 °F (36.2 °C)   TempSrc: Temporal   SpO2: 100%   Weight: 87.2 kg (192 lb 3.2 oz)   Height: 166.4 cm (65.5\")     Estimated body mass index is 32.8 kg/m² as calculated from the " "following:    Height as of 6/24/22: 165.1 cm (65\").    Weight as of 8/2/22: 89.4 kg (197 lb 1.6 oz).    BMI is >= 30 and <35. (Class 1 Obesity). The following options were offered after discussion;: exercise counseling/recommendations and nutrition counseling/recommendations      Does the patient have evidence of cognitive impairment? No    Physical Exam  Vitals reviewed.   Constitutional:       Appearance: Normal appearance. She is well-developed.   HENT:      Head: Normocephalic and atraumatic.      Mouth/Throat:      Pharynx: No oropharyngeal exudate.   Eyes:      Conjunctiva/sclera: Conjunctivae normal.      Pupils: Pupils are equal, round, and reactive to light.   Cardiovascular:      Rate and Rhythm: Normal rate and regular rhythm.      Heart sounds: No murmur heard.    No friction rub. No gallop.   Pulmonary:      Effort: Pulmonary effort is normal.      Breath sounds: Normal breath sounds. No wheezing or rhonchi.   Skin:     General: Skin is warm and dry.   Neurological:      Mental Status: She is alert and oriented to person, place, and time.   Psychiatric:         Mood and Affect: Affect normal.                 HEALTH RISK ASSESSMENT    Smoking Status:  Social History     Tobacco Use   Smoking Status Never Smoker   Smokeless Tobacco Never Used   Tobacco Comment    Grew up exposed to second hand smoke     Alcohol Consumption:  Social History     Substance and Sexual Activity   Alcohol Use Never     Fall Risk Screen:    STEADI Fall Risk Assessment was completed, and patient is at LOW risk for falls.Assessment completed on:3/22/2022    Depression Screening:  PHQ-2/PHQ-9 Depression Screening 3/22/2022   Retired PHQ-9 Total Score -   Retired Total Score -   Little Interest or Pleasure in Doing Things 0-->not at all   Feeling Down, Depressed or Hopeless 0-->not at all   PHQ-9: Brief Depression Severity Measure Score 0       Health Habits and Functional and Cognitive Screening:  Functional & Cognitive Status " 8/24/2022   Do you have difficulty preparing food and eating? No   Do you have difficulty bathing yourself, getting dressed or grooming yourself? No   Do you have difficulty using the toilet? No   Do you have difficulty moving around from place to place? No   Do you have trouble with steps or getting out of a bed or a chair? No   Current Diet Well Balanced Diet   Dental Exam Up to date   Eye Exam Up to date   Exercise (times per week) 4 times per week   Current Exercises Include Walking   Do you need help using the phone?  No   Are you deaf or do you have serious difficulty hearing?  No   Do you need help with transportation? No   Do you need help shopping? No   Do you need help preparing meals?  No   Do you need help with housework?  No   Do you need help with laundry? No   Do you need help taking your medications? No   Do you need help managing money? No   Do you ever drive or ride in a car without wearing a seat belt? No   Have you felt unusual stress, anger or loneliness in the last month? Yes   Who do you live with? Spouse   If you need help, do you have trouble finding someone available to you? No   Have you been bothered in the last four weeks by sexual problems? No       Age-appropriate Screening Schedule:  Refer to the list below for future screening recommendations based on patient's age, sex and/or medical conditions. Orders for these recommended tests are listed in the plan section. The patient has been provided with a written plan.    Health Maintenance   Topic Date Due   • TDAP/TD VACCINES (1 - Tdap) Never done   • ZOSTER VACCINE (1 of 2) Never done   • DXA SCAN  09/03/2022   • INFLUENZA VACCINE  10/01/2022   • LIPID PANEL  02/28/2023   • MAMMOGRAM  01/26/2024              Assessment & Plan   CMS Preventative Services Quick Reference  Risk Factors Identified During Encounter  Obesity/Overweight   The above risks/problems have been discussed with the patient.  Follow up actions/plans if indicated are  seen below in the Assessment/Plan Section.  Pertinent information has been shared with the patient in the After Visit Summary.    Diagnoses and all orders for this visit:    1. Encounter for subsequent annual wellness visit (AWV) in Medicare patient (Primary)    2. Impaired fasting glucose  Assessment & Plan:  Checking follow up labs today        Follow Up:   Return in about 6 months (around 2/24/2023) for Next scheduled follow up.     An After Visit Summary and PPPS were made available to the patient.

## 2022-08-22 NOTE — PROGRESS NOTES
Primary Care Provider  Elicia Betts MD   Referring Provider  No ref. provider found    Patient Complaint  Shortness of Breath (Every day, no more than usual. ), Asthma (6 month f/u ), and Wheezing (Often, mostly at night when laying down )      SUBJECTIVE    History of Presenting Illness  Yuliana Da Silva is a pleasant 71 y.o. female who presents to [facility] for follow-up on pulmonary function test.  Patient last saw Dr. Benavidez 5/31/2022.  Patient had been having a chronic cough.  Patient was given Symbicort however she did not get that filled at [place].  In addition patient is under the care of a another pulmonologist, Dr. Hemphill.  He has her on Advair albuterol Singulair and Zyrtec at this time.  Patient states she only came here to get a second opinion on her lung function.  At present time she states she does not have any dyspnea, coughing, wheezing, headaches, chest pain, weight loss or hemoptysis. Denies fevers, chills and night sweats. Yuliana Da Silva is able to perform ADLs without difficulties and denies any swollen glands/lymph nodes in the head or neck.    I have personally reviewed the review of systems, past family, social, medical and surgical histories; and agree with their findings.    Review of Systems   Constitutional: Negative.  Negative for chills, fatigue, fever and unexpected weight change.   HENT: Negative.    Eyes: Negative.    Respiratory: Negative.  Negative for cough, shortness of breath and wheezing.    Cardiovascular: Negative.  Negative for chest pain, palpitations and leg swelling.   Musculoskeletal: Negative.    Skin: Negative.         Family History   Problem Relation Age of Onset   • Diabetes Father    • Hypertension Mother    • Lung cancer Brother         Social History     Socioeconomic History   • Marital status:    Tobacco Use   • Smoking status: Never Smoker   • Smokeless tobacco: Never  Used   • Tobacco comment: Grew up exposed to second hand smoke   Vaping Use   • Vaping Use: Never used   Substance and Sexual Activity   • Alcohol use: Never   • Drug use: Never   • Sexual activity: Defer        Past Medical History:   Diagnosis Date   • Allergic rhinitis 06/04/2013   • Anemia    • Asthma     W/ ACUTE EXACERBATION   • Atypical chest pain    • Cataract    • Cough variant asthma 06/09/2014   • Gastroesophageal reflux disease    • Headache    • Hiatal hernia    • HTN (hypertension)    • Impaired fasting glucose    • Insomnia    • Leukocytopenia    • Lupus (HCC)    • Palpitations    • Pancreatitis     W/ STENT PLACEMENT   • Screening for breast cancer 11/2018    WNL   • SOB (shortness of breath)         Immunization History   Administered Date(s) Administered   • COVID-19 (MODERNA) 1st, 2nd, 3rd Dose Only 02/24/2021   • Fluzone High Dose =>65 Years ([employer] ONLY) 09/01/2021   • Fluzone High-Dose 65+yrs 09/09/2020   • Hepatitis A 04/26/2018   • Influenza TIV (IM) 09/27/2013, 09/26/2017, 09/30/2019   • Pneumococcal Conjugate 13-Valent (PCV13) 10/03/2019   • Pneumococcal Polysaccharide (PPSV23) 11/22/2013       Allergies   Allergen Reactions   • Codeine Hives   • Levofloxacin Hives and Diarrhea   • Sulfamethoxazole-Trimethoprim Diarrhea   • Tetracycline Hives   • Tetracyclines & Related GI Intolerance   • Sulfa Antibiotics GI Intolerance          Current Outpatient Medications:   •  ALBUTEROL SULFATE IN, Inhale., Disp: , Rfl:   •  amLODIPine (NORVASC) 2.5 MG tablet, Take 2.5 mg by mouth Daily., Disp: , Rfl:   •  budesonide-formoterol (Symbicort) 160-4.5 MCG/ACT inhaler, Inhale 2 puffs As Needed (Cough)., Disp: 1 each, Rfl: 12  •  cetirizine (zyrTEC) 10 MG tablet, Take 1 tablet by mouth Daily., Disp: 90 tablet, Rfl: 1  •  Chlorpheniramine Maleate (CHLORPHEN SR PO), Take 10 mg by mouth Daily., Disp: , Rfl:   •  Cholecalciferol (Vitamin D) 50 MCG (2000 UT) tablet, Take 2,000 Units by mouth Daily., Disp: ,  "Rfl:   •  cycloSPORINE (RESTASIS) 0.05 % ophthalmic emulsion, 1 drop 2 (Two) Times a Day., Disp: , Rfl:   •  fluticasone (FLONASE) 50 MCG/ACT nasal spray, , Disp: , Rfl:   •  hydroCHLOROthiazide (HYDRODIURIL) 12.5 MG tablet, Take 12.5 mg by mouth Daily., Disp: , Rfl:   •  hydroxychloroquine (PLAQUENIL) 200 MG tablet, Take 200 mg by mouth Daily., Disp: , Rfl:   •  hydroxychloroquine (PLAQUENIL) 200 MG tablet, Take 1 tablet by mouth Daily., Disp: , Rfl:   •  Lubricating Plus Eye Drops 0.5 % solution, Administer 1 drop to both eyes Daily., Disp: , Rfl:   •  metFORMIN (GLUCOPHAGE) 500 MG tablet, Take 1 tablet by mouth Daily., Disp: , Rfl:   •  metoprolol succinate XL (TOPROL-XL) 25 MG 24 hr tablet, Take 25 mg by mouth Daily., Disp: , Rfl:   •  mometasone (NASONEX) 50 MCG/ACT nasal spray, 2 sprays into the nostril(s) as directed by provider As Needed., Disp: , Rfl:   •  montelukast (Singulair) 10 MG tablet, Take 1 tablet by mouth Daily., Disp: 90 tablet, Rfl: 1  •  Omega 3-6-9 Fatty Acids (OMEGA 3-6-9 PO), Take 1 capsule by mouth Daily., Disp: , Rfl:   •  omeprazole (priLOSEC) 20 MG capsule, Take 1 capsule by mouth 2 (Two) Times a Day., Disp: 180 capsule, Rfl: 1  •  PEG-KCl-NaCl-NaSulf-Na Asc-C (MoviPrep) 100 g reconstituted solution powder, Take 1,000 mL by mouth Take As Directed. Take as directed by prescriber's office, Disp: 1000 mL, Rfl: 0  •  Probiotic Product (PROBIOTIC-10 PO), Take  by mouth Daily., Disp: , Rfl:   •  metFORMIN ER (GLUCOPHAGE-XR) 500 MG 24 hr tablet, Take 1 tablet by mouth Daily With Breakfast for 30 days., Disp: 90 tablet, Rfl: 1  •  predniSONE (DELTASONE) 20 MG tablet, Daily As Needed., Disp: , Rfl:        OBJECTIVE    Vital Signs   /83 (BP Location: Left arm, Patient Position: Sitting, Cuff Size: Adult)   Pulse 72   Temp 97.7 °F (36.5 °C) (Infrared)   Resp 16   Ht 165.1 cm (65\")   Wt 88.5 kg (195 lb)   SpO2 99% Comment: Room air  BMI 32.45 kg/m²     Physical Exam  Vitals reviewed. "   Constitutional:       General: She is not in acute distress.     Appearance: Normal appearance. She is obese. She is not ill-appearing.   HENT:      Head: Normocephalic and atraumatic.      Nose: Nose normal.      Mouth/Throat:      Mouth: Mucous membranes are moist.      Pharynx: Oropharynx is clear.   Eyes:      Extraocular Movements: Extraocular movements intact.      Conjunctiva/sclera: Conjunctivae normal.      Pupils: Pupils are equal, round, and reactive to light.   Cardiovascular:      Rate and Rhythm: Normal rate and regular rhythm.      Pulses: Normal pulses.      Heart sounds: Normal heart sounds.   Pulmonary:      Effort: Pulmonary effort is normal. No respiratory distress.      Breath sounds: Normal breath sounds. No stridor. No wheezing, rhonchi or rales.   Abdominal:      General: Bowel sounds are normal.   Musculoskeletal:         General: Normal range of motion.      Cervical back: Normal range of motion and neck supple.      Right lower leg: No edema.      Left lower leg: No edema.   Lymphadenopathy:      Cervical: No cervical adenopathy.   Skin:     General: Skin is warm and dry.   Neurological:      General: No focal deficit present.      Mental Status: She is alert and oriented to person, place, and time.   Psychiatric:         Mood and Affect: Mood normal.         Behavior: Behavior normal.          Results Review  I have personally reviewed the prior office note of Dr. Benavidez and pulmonary function test which was within normal limits.                                      File Link    Scan on 8/19/2022 by Casimiro Benavidez MD: PULMONARY FUNCTION TEST, [facility], 08/19/2022        Key Information    Document ID File Type Document Type Description   [id number] Image PULMONARY FUNCTION TEST - SCAN PULMONARY FUNCTION TEST, [facility], 08/19/2022       Import Information    Attached At Date Time User Dept   Order Level 8/19/2022  Casimiro Benavidez MD        Order    Pulmonary Function Test [[id number]]        Encounter    Hospital Encounter on 8/19/22 with [facility]       ASSESSMENT & PLAN    Patient Active Problem List   Diagnosis   • Essential hypertension   • Impaired fasting glucose   • Weight gain   • Insomnia   • Cough   • Pancreatitis   • Pre-diabetes   • Colon cancer screening   • Acute non-recurrent frontal sinusitis   • Allergic rhinitis       Diagnoses and all orders for this visit:    1. Cough (Primary)    2. Systemic lupus erythematosus with lung involvement, unspecified SLE type (HCC)    3. Persistent asthma without complication, unspecified asthma severity           Plan:  At this time patient to continue with her current medications as prescribed by her other pulmonologist.  Recommend patient to follow-up on a as needed basis.    Smoking status: Never  Vaccination status: 1 dose of COVID current on flu and pneumonia  Medications personally reviewed    Follow Up  Return if symptoms worsen or fail to improve.    Patient was given instructions and counseling regarding her condition or for health maintenance advice. Please see specific information pulled into the AVS if appropriate.

## 2022-08-23 ENCOUNTER — OFFICE VISIT (OUTPATIENT)
Dept: PULMONOLOGY | Facility: CLINIC | Age: 72
End: 2022-08-23

## 2022-08-23 VITALS
DIASTOLIC BLOOD PRESSURE: 83 MMHG | TEMPERATURE: 97.7 F | HEIGHT: 65 IN | SYSTOLIC BLOOD PRESSURE: 148 MMHG | WEIGHT: 195 LBS | HEART RATE: 72 BPM | RESPIRATION RATE: 16 BRPM | BODY MASS INDEX: 32.49 KG/M2 | OXYGEN SATURATION: 99 %

## 2022-08-23 DIAGNOSIS — R05.9 COUGH: Primary | ICD-10-CM

## 2022-08-23 DIAGNOSIS — M32.13 SYSTEMIC LUPUS ERYTHEMATOSUS WITH LUNG INVOLVEMENT, UNSPECIFIED SLE TYPE: ICD-10-CM

## 2022-08-23 DIAGNOSIS — J45.909 PERSISTENT ASTHMA WITHOUT COMPLICATION, UNSPECIFIED ASTHMA SEVERITY: ICD-10-CM

## 2022-08-23 PROCEDURE — 99213 OFFICE O/P EST LOW 20 MIN: CPT | Performed by: NURSE PRACTITIONER

## 2022-08-23 RX ORDER — HYDROXYCHLOROQUINE SULFATE 200 MG/1
1 TABLET, FILM COATED ORAL DAILY
COMMUNITY
Start: 2022-07-26 | End: 2022-08-24

## 2022-08-24 ENCOUNTER — OFFICE VISIT (OUTPATIENT)
Dept: INTERNAL MEDICINE | Facility: CLINIC | Age: 72
End: 2022-08-24

## 2022-08-24 ENCOUNTER — TELEPHONE (OUTPATIENT)
Dept: GASTROENTEROLOGY | Facility: CLINIC | Age: 72
End: 2022-08-24

## 2022-08-24 VITALS
HEART RATE: 77 BPM | TEMPERATURE: 97.2 F | WEIGHT: 192.2 LBS | DIASTOLIC BLOOD PRESSURE: 70 MMHG | HEIGHT: 66 IN | SYSTOLIC BLOOD PRESSURE: 90 MMHG | RESPIRATION RATE: 12 BRPM | BODY MASS INDEX: 30.89 KG/M2 | OXYGEN SATURATION: 100 %

## 2022-08-24 DIAGNOSIS — R73.01 IMPAIRED FASTING GLUCOSE: ICD-10-CM

## 2022-08-24 DIAGNOSIS — Z00.00 ENCOUNTER FOR SUBSEQUENT ANNUAL WELLNESS VISIT (AWV) IN MEDICARE PATIENT: Primary | ICD-10-CM

## 2022-08-24 PROBLEM — H26.9 CATARACT: Status: ACTIVE | Noted: 2022-08-24

## 2022-08-24 PROBLEM — G47.00 INSOMNIA: Status: RESOLVED | Noted: 2021-08-27 | Resolved: 2022-08-24

## 2022-08-24 PROBLEM — K21.9 ESOPHAGEAL REFLUX: Status: ACTIVE | Noted: 2022-08-24

## 2022-08-24 PROBLEM — Z12.39 SCREENING FOR BREAST CANCER: Status: ACTIVE | Noted: 2018-11-01

## 2022-08-24 PROBLEM — R06.02 SHORTNESS OF BREATH: Status: ACTIVE | Noted: 2022-08-24

## 2022-08-24 PROBLEM — K46.9 ABDOMINAL HERNIA: Status: ACTIVE | Noted: 2022-08-24

## 2022-08-24 PROBLEM — D72.819 LEUKOCYTOPENIA: Status: ACTIVE | Noted: 2022-08-24

## 2022-08-24 PROBLEM — K85.90 PANCREATITIS: Status: ACTIVE | Noted: 2022-08-24

## 2022-08-24 PROBLEM — G47.00 INSOMNIA: Status: ACTIVE | Noted: 2022-08-24

## 2022-08-24 PROBLEM — I10 HYPERTENSION: Status: ACTIVE | Noted: 2022-08-24

## 2022-08-24 PROBLEM — D64.9 ANEMIA: Status: ACTIVE | Noted: 2022-08-24

## 2022-08-24 PROBLEM — R51.9 HEADACHE: Status: ACTIVE | Noted: 2022-08-24

## 2022-08-24 PROBLEM — J45.909 ASTHMA: Status: ACTIVE | Noted: 2022-08-24

## 2022-08-24 PROCEDURE — 1159F MED LIST DOCD IN RCRD: CPT | Performed by: INTERNAL MEDICINE

## 2022-08-24 PROCEDURE — 1170F FXNL STATUS ASSESSED: CPT | Performed by: INTERNAL MEDICINE

## 2022-08-24 PROCEDURE — G0439 PPPS, SUBSEQ VISIT: HCPCS | Performed by: INTERNAL MEDICINE

## 2022-08-24 NOTE — TELEPHONE ENCOUNTER
Per PAT, patient cancelled appt and will call back at a later time to reschedule due to family member inpatient.

## 2022-08-24 NOTE — PATIENT INSTRUCTIONS
Medicare Wellness  Personal Prevention Plan of Service     Date of Office Visit:    Encounter Provider:  Elicia Betts MD  Place of Service:  [facility]  Patient Name: Yuliana Da Silva  :  1950    As part of the Medicare Wellness portion of your visit today, we are providing you with this personalized preventive plan of services (PPPS). This plan is based upon recommendations of the [place] Preventive Services Task Force (USPSTF) and the Advisory Committee on Immunization Practices (ACIP).    This lists the preventive care services that should be considered, and provides dates of when you are due. Items listed as completed are up-to-date and do not require any further intervention.    Health Maintenance   Topic Date Due    COLORECTAL CANCER SCREENING  Never done    TDAP/TD VACCINES (1 - Tdap) Never done    ZOSTER VACCINE (1 of 2) Never done    COVID-19 Vaccine (2 - Moderna series) 2021    Pneumococcal Vaccine 65+ (3 - PPSV23 or PCV20) 2024 (Originally 10/3/2020)    DXA SCAN  2022    INFLUENZA VACCINE  10/01/2022    LIPID PANEL  2023    ANNUAL WELLNESS VISIT  2023    MAMMOGRAM  2024    HEPATITIS C SCREENING  Completed       No orders of the defined types were placed in this encounter.      Return in about 6 months (around 2023) for Next scheduled follow up.

## 2022-08-25 ENCOUNTER — TELEPHONE (OUTPATIENT)
Dept: GASTROENTEROLOGY | Facility: CLINIC | Age: 72
End: 2022-08-25

## 2022-08-25 ENCOUNTER — CLINICAL SUPPORT (OUTPATIENT)
Dept: INTERNAL MEDICINE | Facility: CLINIC | Age: 72
End: 2022-08-25

## 2022-08-25 DIAGNOSIS — I10 ESSENTIAL HYPERTENSION: ICD-10-CM

## 2022-08-25 DIAGNOSIS — R73.03 PRE-DIABETES: ICD-10-CM

## 2022-08-25 DIAGNOSIS — D72.824 BASOPHILIA: ICD-10-CM

## 2022-08-25 LAB
ALBUMIN SERPL-MCNC: 4.4 G/DL (ref 3.5–5.2)
ALBUMIN/GLOB SERPL: 1.7 G/DL
ALP SERPL-CCNC: 70 U/L (ref 39–117)
ALT SERPL W P-5'-P-CCNC: 23 U/L (ref 1–33)
ANION GAP SERPL CALCULATED.3IONS-SCNC: 12.3 MMOL/L (ref 5–15)
AST SERPL-CCNC: 29 U/L (ref 1–32)
BASOPHILS # BLD MANUAL: 0.05 10*3/MM3 (ref 0–0.2)
BASOPHILS NFR BLD MANUAL: 1 % (ref 0–1.5)
BILIRUB SERPL-MCNC: 0.4 MG/DL (ref 0–1.2)
BUN SERPL-MCNC: 11 MG/DL (ref 8–23)
BUN/CREAT SERPL: 13.3 (ref 7–25)
BURR CELLS BLD QL SMEAR: ABNORMAL
CALCIUM SPEC-SCNC: 9.2 MG/DL (ref 8.6–10.5)
CHLORIDE SERPL-SCNC: 105 MMOL/L (ref 98–107)
CHOLEST SERPL-MCNC: 198 MG/DL (ref 0–200)
CLUMPED PLATELETS: PRESENT
CO2 SERPL-SCNC: 23.7 MMOL/L (ref 22–29)
CREAT SERPL-MCNC: 0.83 MG/DL (ref 0.57–1)
DEPRECATED RDW RBC AUTO: 42.5 FL (ref 37–54)
DEPRECATED RDW RBC AUTO: 43.3 FL (ref 37–54)
EGFRCR SERPLBLD CKD-EPI 2021: 75 ML/MIN/1.73
EOSINOPHIL # BLD MANUAL: 0.2 10*3/MM3 (ref 0–0.4)
EOSINOPHIL # BLD MANUAL: 0.4 10*3/MM3 (ref 0–0.4)
EOSINOPHIL NFR BLD MANUAL: 4.1 % (ref 0.3–6.2)
EOSINOPHIL NFR BLD MANUAL: 8 % (ref 0.3–6.2)
ERYTHROCYTE [DISTWIDTH] IN BLOOD BY AUTOMATED COUNT: 13.1 % (ref 12.3–15.4)
ERYTHROCYTE [DISTWIDTH] IN BLOOD BY AUTOMATED COUNT: 13.2 % (ref 12.3–15.4)
GLOBULIN UR ELPH-MCNC: 2.6 GM/DL
GLUCOSE SERPL-MCNC: 94 MG/DL (ref 65–99)
HBA1C MFR BLD: 6.3 % (ref 4.8–5.6)
HCT VFR BLD AUTO: 39 % (ref 34–46.6)
HCT VFR BLD AUTO: 39.5 % (ref 34–46.6)
HDLC SERPL-MCNC: 91 MG/DL (ref 40–60)
HGB BLD-MCNC: 12.8 G/DL (ref 12–15.9)
HGB BLD-MCNC: 12.9 G/DL (ref 12–15.9)
LARGE PLATELETS: ABNORMAL
LDLC SERPL CALC-MCNC: 94 MG/DL (ref 0–100)
LDLC/HDLC SERPL: 1.02 {RATIO}
LYMPHOCYTES # BLD MANUAL: 3.18 10*3/MM3 (ref 0.7–3.1)
LYMPHOCYTES # BLD MANUAL: 3.42 10*3/MM3 (ref 0.7–3.1)
LYMPHOCYTES NFR BLD MANUAL: 6 % (ref 5–12)
LYMPHOCYTES NFR BLD MANUAL: 9.2 % (ref 5–12)
MCH RBC QN AUTO: 29.3 PG (ref 26.6–33)
MCH RBC QN AUTO: 29.3 PG (ref 26.6–33)
MCHC RBC AUTO-ENTMCNC: 32.7 G/DL (ref 31.5–35.7)
MCHC RBC AUTO-ENTMCNC: 32.8 G/DL (ref 31.5–35.7)
MCV RBC AUTO: 89.2 FL (ref 79–97)
MCV RBC AUTO: 89.6 FL (ref 79–97)
MONOCYTES # BLD: 0.3 10*3/MM3 (ref 0.1–0.9)
MONOCYTES # BLD: 0.44 10*3/MM3 (ref 0.1–0.9)
MYELOCYTES NFR BLD MANUAL: 1 % (ref 0–0)
NEUTROPHILS # BLD AUTO: 0.88 10*3/MM3 (ref 1.7–7)
NEUTROPHILS # BLD AUTO: 0.91 10*3/MM3 (ref 1.7–7)
NEUTROPHILS NFR BLD MANUAL: 18 % (ref 42.7–76)
NEUTROPHILS NFR BLD MANUAL: 18.4 % (ref 42.7–76)
NRBC SPEC MANUAL: 2 /100 WBC (ref 0–0.2)
PATHOLOGY REVIEW: YES
PLAT MORPH BLD: NORMAL
PLATELET # BLD AUTO: 243 10*3/MM3 (ref 140–450)
PLATELET # BLD AUTO: 245 10*3/MM3 (ref 140–450)
PMV BLD AUTO: 10.2 FL (ref 6–12)
PMV BLD AUTO: 10.8 FL (ref 6–12)
POIKILOCYTOSIS BLD QL SMEAR: ABNORMAL
POTASSIUM SERPL-SCNC: 3.9 MMOL/L (ref 3.5–5.2)
PROT SERPL-MCNC: 7 G/DL (ref 6–8.5)
RBC # BLD AUTO: 4.37 10*6/MM3 (ref 3.77–5.28)
RBC # BLD AUTO: 4.41 10*6/MM3 (ref 3.77–5.28)
RBC MORPH BLD: NORMAL
SMALL PLATELETS BLD QL SMEAR: ADEQUATE
SMUDGE CELLS BLD QL SMEAR: ABNORMAL
SODIUM SERPL-SCNC: 141 MMOL/L (ref 136–145)
TRIGL SERPL-MCNC: 71 MG/DL (ref 0–150)
VARIANT LYMPHS NFR BLD MANUAL: 66.3 % (ref 19.6–45.3)
VARIANT LYMPHS NFR BLD MANUAL: 68 % (ref 19.6–45.3)
VLDLC SERPL-MCNC: 13 MG/DL (ref 5–40)
WBC MORPH BLD: NORMAL
WBC NRBC COR # BLD: 4.8 10*3/MM3 (ref 3.4–10.8)
WBC NRBC COR # BLD: 5.03 10*3/MM3 (ref 3.4–10.8)

## 2022-08-25 PROCEDURE — 85025 COMPLETE CBC W/AUTO DIFF WBC: CPT | Performed by: PHYSICIAN ASSISTANT

## 2022-08-25 PROCEDURE — 85027 COMPLETE CBC AUTOMATED: CPT | Performed by: NURSE PRACTITIONER

## 2022-08-25 PROCEDURE — 85007 BL SMEAR W/DIFF WBC COUNT: CPT | Performed by: NURSE PRACTITIONER

## 2022-08-25 PROCEDURE — 83036 HEMOGLOBIN GLYCOSYLATED A1C: CPT | Performed by: PHYSICIAN ASSISTANT

## 2022-08-25 PROCEDURE — 80053 COMPREHEN METABOLIC PANEL: CPT | Performed by: PHYSICIAN ASSISTANT

## 2022-08-25 PROCEDURE — 85007 BL SMEAR W/DIFF WBC COUNT: CPT | Performed by: PHYSICIAN ASSISTANT

## 2022-08-25 PROCEDURE — 80061 LIPID PANEL: CPT | Performed by: PHYSICIAN ASSISTANT

## 2022-08-25 NOTE — TELEPHONE ENCOUNTER
Pt called and left a vm to cancel her scope due to having to be at the hospital with her brother. Pt has been cancelled and stated that she would call back to reschedule at a later time.

## 2022-08-29 LAB
CYTO UR: NORMAL
LAB AP CASE REPORT: NORMAL
LAB AP CLINICAL INFORMATION: NORMAL
PATH REPORT.FINAL DX SPEC: NORMAL
PATH REPORT.GROSS SPEC: NORMAL

## 2022-09-22 ENCOUNTER — TELEPHONE (OUTPATIENT)
Dept: INTERNAL MEDICINE | Facility: CLINIC | Age: 72
End: 2022-09-22

## 2022-09-22 DIAGNOSIS — R73.03 PRE-DIABETES: ICD-10-CM

## 2022-09-22 RX ORDER — METFORMIN HYDROCHLORIDE 500 MG/1
500 TABLET, EXTENDED RELEASE ORAL 2 TIMES DAILY
Qty: 180 TABLET | Refills: 1 | Status: SHIPPED | OUTPATIENT
Start: 2022-09-22 | End: 2022-11-21

## 2022-09-22 NOTE — TELEPHONE ENCOUNTER
----- Message from Elicia Betts MD sent at 9/21/2022  8:42 AM EDT -----  Can increase Metformin to BID dosing.

## 2022-09-23 NOTE — TELEPHONE ENCOUNTER
Red rule verified and correct.    Pt calling stating that [place] pharmacy does not have her Rx; advised it was sent yesterday at 12:57 pm, receipt confirmed.    She will check with them again.

## 2022-10-03 DIAGNOSIS — D70.9 NEUTROPENIA, UNSPECIFIED TYPE: Primary | ICD-10-CM

## 2022-10-06 ENCOUNTER — TELEPHONE (OUTPATIENT)
Dept: INTERNAL MEDICINE | Facility: CLINIC | Age: 72
End: 2022-10-06

## 2022-10-06 NOTE — TELEPHONE ENCOUNTER
----- Message from Luna Dunlap PA-C sent at 10/3/2022  3:41 PM EDT -----  Neutophil (part of the white blood cells) levels have been decreasing over the past couple years, with worsening decline the past month.  Would recommend patient be evaluated by hematology/oncology.  Order placed.

## 2022-10-06 NOTE — TELEPHONE ENCOUNTER
I called the patient to inform them of the message about lab results from Luna Dunlap. Patient said she has an appointment next month with hematology/oncology. She will keep that appointment to follow up results.

## 2022-10-20 ENCOUNTER — OFFICE VISIT (OUTPATIENT)
Dept: GASTROENTEROLOGY | Facility: CLINIC | Age: 72
End: 2022-10-20

## 2022-10-20 VITALS
WEIGHT: 193 LBS | BODY MASS INDEX: 31.02 KG/M2 | HEART RATE: 73 BPM | SYSTOLIC BLOOD PRESSURE: 143 MMHG | DIASTOLIC BLOOD PRESSURE: 71 MMHG | HEIGHT: 66 IN

## 2022-10-20 DIAGNOSIS — K21.9 GASTROESOPHAGEAL REFLUX DISEASE, UNSPECIFIED WHETHER ESOPHAGITIS PRESENT: Primary | ICD-10-CM

## 2022-10-20 DIAGNOSIS — K86.1 CHRONIC PANCREATITIS, UNSPECIFIED PANCREATITIS TYPE: ICD-10-CM

## 2022-10-20 PROCEDURE — 99213 OFFICE O/P EST LOW 20 MIN: CPT | Performed by: NURSE PRACTITIONER

## 2022-10-20 NOTE — PROGRESS NOTES
Chief Complaint     Heartburn    History of Present Illness     Yuliana Da Silva is a 72 y.o. female who presents to [facility] for follow-up of GERD and chronic pancreatitis    She reports that she's only needing omeprazole once every 2 weeks.      She was started on metformin and has lost some weight.  Reports that it has reduced her appetite.      Denies abdominal pain.      She was scheduled for a colonoscopy and had to cancel due to her brother being hospitalized.  It is rescheduled for March.         History      Past Medical History:   Diagnosis Date   • Allergic rhinitis 06/04/2013   • Anemia    • Asthma     W/ ACUTE EXACERBATION   • Atypical chest pain    • Cataract    • Cough variant asthma 06/09/2014   • Gastroesophageal reflux disease    • Headache    • Hiatal hernia    • HTN (hypertension)    • Impaired fasting glucose    • Insomnia    • Leukocytopenia    • Lupus (HCC)    • Palpitations    • Pancreatitis     W/ STENT PLACEMENT   • Screening for breast cancer 11/2018    WNL   • SOB (shortness of breath)      Past Surgical History:   Procedure Laterality Date   • COLONOSCOPY  2008   • ENDOSCOPY  2004   • HYSTERECTOMY  1998    FOR FIBROIDS. BOTH ovaries removed      Family History   Problem Relation Age of Onset   • Diabetes Father    • Hypertension Mother    • Lung cancer Brother         Current Medications       Current Outpatient Medications:   •  ALBUTEROL SULFATE IN, Inhale., Disp: , Rfl:   •  amLODIPine (NORVASC) 2.5 MG tablet, Take 2.5 mg by mouth Daily., Disp: , Rfl:   •  budesonide-formoterol (Symbicort) 160-4.5 MCG/ACT inhaler, Inhale 2 puffs As Needed (Cough)., Disp: 1 each, Rfl: 12  •  cetirizine (zyrTEC) 10 MG tablet, Take 1 tablet by mouth Daily., Disp: 90 tablet, Rfl: 1  •  Chlorpheniramine Maleate (CHLORPHEN SR PO), Take 10 mg by mouth Daily., Disp: , Rfl:   •  Cholecalciferol (Vitamin D) 50 MCG (2000 UT) tablet, Take 2,000 Units by mouth 1 (One) Time  Per Week., Disp: , Rfl:   •  cycloSPORINE (RESTASIS) 0.05 % ophthalmic emulsion, 1 drop 2 (Two) Times a Day., Disp: , Rfl:   •  fluticasone (FLONASE) 50 MCG/ACT nasal spray, , Disp: , Rfl:   •  hydroCHLOROthiazide (HYDRODIURIL) 12.5 MG tablet, Take 12.5 mg by mouth Daily., Disp: , Rfl:   •  hydroxychloroquine (PLAQUENIL) 200 MG tablet, Take 200 mg by mouth Daily., Disp: , Rfl:   •  Lubricating Plus Eye Drops 0.5 % solution, Administer 1 drop to both eyes Daily., Disp: , Rfl:   •  metFORMIN ER (GLUCOPHAGE-XR) 500 MG 24 hr tablet, Take 1 tablet by mouth 2 (Two) Times a Day for 30 days., Disp: 180 tablet, Rfl: 1  •  metoprolol succinate XL (TOPROL-XL) 25 MG 24 hr tablet, Take 25 mg by mouth Daily., Disp: , Rfl:   •  mometasone (NASONEX) 50 MCG/ACT nasal spray, 2 sprays into the nostril(s) as directed by provider As Needed., Disp: , Rfl:   •  montelukast (Singulair) 10 MG tablet, Take 1 tablet by mouth Daily., Disp: 90 tablet, Rfl: 1  •  Omega 3-6-9 Fatty Acids (OMEGA 3-6-9 PO), Take 1 capsule by mouth Daily., Disp: , Rfl:   •  omeprazole (priLOSEC) 20 MG capsule, Take 1 capsule by mouth 2 (Two) Times a Day., Disp: 180 capsule, Rfl: 1  •  PEG-KCl-NaCl-NaSulf-Na Asc-C (MoviPrep) 100 g reconstituted solution powder, Take 1,000 mL by mouth Take As Directed. Take as directed by prescriber's office, Disp: 1000 mL, Rfl: 0  •  predniSONE (DELTASONE) 20 MG tablet, Daily As Needed., Disp: , Rfl:   •  Probiotic Product (PROBIOTIC-10 PO), Take  by mouth Daily., Disp: , Rfl:      Allergies     Allergies   Allergen Reactions   • Codeine Hives   • Levofloxacin Hives and Diarrhea   • Sulfamethoxazole-Trimethoprim Diarrhea   • Tetracycline Hives   • Tetracyclines & Related GI Intolerance   • Sulfa Antibiotics GI Intolerance       Social History       Social History     Social History Narrative   • Not on file         Objective       /71 (BP Location: Left arm, Patient Position: Sitting, Cuff Size: Adult)   Pulse 73   Ht 166.4  "cm (65.5\")   Wt 87.5 kg (193 lb)   BMI 31.63 kg/m²       Physical Exam    Results       Result Review :    The following data was reviewed by: ABI Rolle on 10/20/2022:    CBC w/diff    CBC w/Diff 4/22/22 8/25/22 8/25/22     0956 0956   WBC 4.81 5.03 4.80   RBC 4.56 4.41 4.37   Hemoglobin 13.0 12.9 12.8   Hematocrit 40.2 39.5 39.0   MCV 88.2 89.6 89.2   MCH 28.5 29.3 29.3   MCHC 32.3 32.7 32.8   RDW 12.9 13.2 13.1   Platelets 243 245 243   Neutrophil Rel % 41.2 (A)     Immature Granulocyte Rel % 0.2     Lymphocyte Rel % 46.2 (A)     Monocyte Rel % 9.1     Eosinophil Rel % 2.5     Basophil Rel % 0.8     (A) Abnormal value            CMP    CMP 2/28/22 8/25/22   Glucose 87 94   BUN 11 11   Creatinine 0.83 0.83   Sodium 144 141   Potassium 3.5 3.9   Chloride 105 105   Calcium 9.6 9.2   Albumin 4.90 4.40   Total Bilirubin 0.5 0.4   Alkaline Phosphatase 78 70   AST (SGOT) 30 29   ALT (SGPT) 28 23                      Assessment and Plan              Diagnoses and all orders for this visit:    1. Gastroesophageal reflux disease, unspecified whether esophagitis present (Primary)    2. Chronic pancreatitis, unspecified pancreatitis type (HCC)      Recommend that she continue omeprazole PRN.  F/u with Dr. Alejandra GARCIA pancreatitis.          Follow Up     Follow Up   Return if symptoms worsen or fail to improve.  Patient was given instructions and counseling regarding her condition or for health maintenance advice. Please see specific information pulled into the AVS if appropriate.     "

## 2022-11-21 ENCOUNTER — HOSPITAL ENCOUNTER (OUTPATIENT)
Dept: GENERAL RADIOLOGY | Facility: HOSPITAL | Age: 72
Discharge: HOME | End: 2022-11-21

## 2022-11-21 ENCOUNTER — OFFICE VISIT (OUTPATIENT)
Dept: INTERNAL MEDICINE | Facility: CLINIC | Age: 72
End: 2022-11-21

## 2022-11-21 VITALS
SYSTOLIC BLOOD PRESSURE: 140 MMHG | DIASTOLIC BLOOD PRESSURE: 86 MMHG | HEART RATE: 87 BPM | OXYGEN SATURATION: 98 % | BODY MASS INDEX: 31.79 KG/M2 | TEMPERATURE: 97.9 F | HEIGHT: 66 IN | WEIGHT: 197.8 LBS

## 2022-11-21 DIAGNOSIS — M79.602 LEFT ARM PAIN: Primary | ICD-10-CM

## 2022-11-21 PROCEDURE — 99213 OFFICE O/P EST LOW 20 MIN: CPT | Performed by: NURSE PRACTITIONER

## 2022-11-21 PROCEDURE — 73070 X-RAY EXAM OF ELBOW: CPT

## 2022-11-21 PROCEDURE — 73030 X-RAY EXAM OF SHOULDER: CPT

## 2022-11-21 RX ORDER — METFORMIN HYDROCHLORIDE 500 MG/1
1 TABLET, EXTENDED RELEASE ORAL EVERY MORNING
COMMUNITY

## 2022-11-21 NOTE — PROGRESS NOTES
"Chief Complaint  Upper Extremity Issue (LEFT ARM, CAN HARDLY MOVE ABOVE SHOULDER )    Subjective        Yuliana Da Silva presents to [facility] for History of Present Illness  Left arm pain.  Patient reports that for the last several weeks she has had increasing left arm pain, primarily in the shoulder and the elbow.  Patient does have history of lupus.  She has not had any major joint problems in the past.  She is on Plaquenil.  She does not report any significant injury, she has been using Tylenol as needed for pain with some relief.  No other significant concerns or complaints today.       Objective   Vital Signs:   /86 (BP Location: Left arm, Patient Position: Sitting, Cuff Size: Adult)   Pulse 87   Temp 97.9 °F (36.6 °C) (Temporal)   Ht 166.4 cm (65.5\")   Wt 89.7 kg (197 lb 12.8 oz)   SpO2 98%   BMI 32.42 kg/m²     Physical Exam  Vitals and nursing note reviewed.   Constitutional:       Appearance: Normal appearance.   HENT:      Head: Normocephalic and atraumatic.   Pulmonary:      Effort: Pulmonary effort is normal.   Musculoskeletal:      Left shoulder: Tenderness present. No swelling or deformity. Normal range of motion. Normal strength.      Left elbow: No swelling or deformity. Normal range of motion. Tenderness present.   Neurological:      Mental Status: She is alert.   Psychiatric:         Mood and Affect: Mood normal.        Result Review :  {The following data was reviewed by ABI Elizondo on 11/21/22                Diagnoses and all orders for this visit:    1. Left arm pain (Primary)  -     XR Shoulder 2+ View Left (In Office)  -     XR Elbow 2 View Left (In Office)    Other orders  -     diclofenac (VOLTAREN) 50 MG EC tablet; Take 1 tablet by mouth 2 (Two) Times a Day.  Dispense: 60 tablet; Refill: 0    We will go ahead and get x-rays, patient will try to get these done today.  We will start on anti-inflammatory, diclofenac, she is leaving tomorrow " for a trip for 9 days.  Will refer to Ortho if appropriate.  Awaiting results.      Follow Up   No follow-ups on file.  Patient was given instructions and counseling regarding her condition or for health maintenance advice. Please see specific information pulled into the AVS if appropriate.     Justen De Jesus, APRN  11/21/2022  This note was electronically signed.

## 2022-11-22 DIAGNOSIS — M25.512 LEFT SHOULDER PAIN, UNSPECIFIED CHRONICITY: Primary | ICD-10-CM

## 2022-12-06 ENCOUNTER — TRANSCRIBE ORDERS (OUTPATIENT)
Dept: ADMINISTRATIVE | Facility: HOSPITAL | Age: 72
End: 2022-12-06

## 2022-12-06 DIAGNOSIS — Z92.89 HISTORY OF MAMMOGRAPHY, SCREENING: Primary | ICD-10-CM

## 2022-12-07 ENCOUNTER — OFFICE VISIT (OUTPATIENT)
Dept: ORTHOPEDIC SURGERY | Facility: CLINIC | Age: 72
End: 2022-12-07

## 2022-12-07 VITALS — OXYGEN SATURATION: 98 % | HEIGHT: 65 IN | HEART RATE: 76 BPM | WEIGHT: 190 LBS | BODY MASS INDEX: 31.65 KG/M2

## 2022-12-07 DIAGNOSIS — M19.012 ARTHRITIS OF LEFT ACROMIOCLAVICULAR JOINT: Primary | ICD-10-CM

## 2022-12-07 DIAGNOSIS — M25.522 LEFT ELBOW PAIN: ICD-10-CM

## 2022-12-07 PROCEDURE — 99203 OFFICE O/P NEW LOW 30 MIN: CPT | Performed by: ORTHOPAEDIC SURGERY

## 2022-12-07 NOTE — PROGRESS NOTES
"Chief Complaint  Initial Evaluation of the Left Shoulder and Initial Evaluation of the Left Elbow     Subjective      Yuliana Da Silva presents to [facility] for left elbow and left shoulder.  She has had pain the last couple of months.  She has pain that hits in her elbow and runs up to the shoulder.  She has had no new injury.  She has difficulty with reaching overhead and has pain with movement.     Allergies   Allergen Reactions   • Codeine Hives   • Levofloxacin Hives, Diarrhea and Unknown - High Severity   • Sulfamethoxazole-Trimethoprim Diarrhea and Unknown - High Severity   • Tetracycline Hives and Unknown - High Severity   • Tetracyclines & Related GI Intolerance   • Sulfa Antibiotics GI Intolerance        Social History     Socioeconomic History   • Marital status:    Tobacco Use   • Smoking status: Never   • Smokeless tobacco: Never   • Tobacco comments:     Grew up exposed to second hand smoke   Vaping Use   • Vaping Use: Never used   Substance and Sexual Activity   • Alcohol use: Never   • Drug use: Never   • Sexual activity: Defer        Review of Systems     Objective   Vital Signs:   Pulse 76   Ht 165.1 cm (65\")   Wt 86.2 kg (190 lb)   SpO2 98%   BMI 31.62 kg/m²       Physical Exam  Constitutional:       Appearance: Normal appearance. Patient is well-developed and normal weight.   HENT:      Head: Normocephalic.      Right Ear: Hearing and external ear normal.      Left Ear: Hearing and external ear normal.      Nose: Nose normal.   Eyes:      Conjunctiva/sclera: Conjunctivae normal.   Cardiovascular:      Rate and Rhythm: Normal rate.   Pulmonary:      Effort: Pulmonary effort is normal.      Breath sounds: No wheezing or rales.   Abdominal:      Palpations: Abdomen is soft.      Tenderness: There is no abdominal tenderness.   Musculoskeletal:      Cervical back: Normal range of motion.   Skin:     Findings: No rash.   Neurological:      Mental Status: " Patient  is alert and oriented to person, place, and time.   Psychiatric:         Mood and Affect: Mood and affect normal.         Judgment: Judgment normal.       Ortho Exam      LEFT SHOULDER AC joint tenderness with palpation. Radial pulse 2+. Ulnar pulse 2+. Good tone of deltoid, bicep, triceps, wrist extensors and flexors. Dorsal pedal pulse 2+. Posterior tibialis pulse is 2+. Sensation intact. Neurovascular Intact.     LEFT ELBOW Soft tissue mass in antecubitalfossa about the size of the thumb. Radial pulse 2+. Ulnar pulse 2+. Good tone of deltoid, bicep, triceps, wrist extensors and flexors. Radial pulse 2+. Ulnar pulse 2+. Full flexion extension.     Procedures        Imaging Results (Most Recent)     None           Result Review :         XR Shoulder 2+ View Left (In Office)    Result Date: 11/22/2022  Narrative: PROCEDURE: XR ELBOW 2 VW LEFT, 11/21/2022, 14:36 XR SHOULDER 2+ VW LEFT, 11/21/2022, 14:34  COMPARISON: None  INDICATIONS: LEFT ANTERIOR ELBOW PAIN, LIMITED ROM  FINDINGS:  Elbow:  Bone mineral density is normal.  No other joint effusion.  Mild joint space narrowing.  Findings of some chronic calcific tendinitis of the common flexor tendon origin.  No fractures or dislocations.  No significant soft tissue swelling.  Shoulder:  Mild glenohumeral joint space narrowing.  Subchondral cystic change of the greater tuberosity which can be associated with chronic rotator cuff pathology.  Mild, age-appropriate degenerative change of the acromioclavicular joint.  No fractures or dislocations.  No aggressive osseous lesions.  Visualized thorax is clear.      Impression:   1. Elbow:  Mild arthritis.  Findings of calcific tendinitis of the common flexor tendon origin. 2. 3. Shoulder:  Mild glenohumeral joint arthritis.  Findings there os insertion with chronic rotator cuff pathology.  Mild, age-appropriate degenerative change of the acromioclavicular joint.      IZA ARREGUIN MD       Electronically Signed and  Approved By: IZA ARREGUIN MD on 11/22/2022 at 7:28             XR Elbow 2 View Left (In Office)    Result Date: 11/22/2022  Narrative: PROCEDURE: XR ELBOW 2 VW LEFT, 11/21/2022, 14:36 XR SHOULDER 2+ VW LEFT, 11/21/2022, 14:34  COMPARISON: None  INDICATIONS: LEFT ANTERIOR ELBOW PAIN, LIMITED ROM  FINDINGS:  Elbow:  Bone mineral density is normal.  No other joint effusion.  Mild joint space narrowing.  Findings of some chronic calcific tendinitis of the common flexor tendon origin.  No fractures or dislocations.  No significant soft tissue swelling.  Shoulder:  Mild glenohumeral joint space narrowing.  Subchondral cystic change of the greater tuberosity which can be associated with chronic rotator cuff pathology.  Mild, age-appropriate degenerative change of the acromioclavicular joint.  No fractures or dislocations.  No aggressive osseous lesions.  Visualized thorax is clear.      Impression:   1. Elbow:  Mild arthritis.  Findings of calcific tendinitis of the common flexor tendon origin. 2. 3. Shoulder:  Mild glenohumeral joint arthritis.  Findings there os insertion with chronic rotator cuff pathology.  Mild, age-appropriate degenerative change of the acromioclavicular joint.      IZA ARREGUIN MD       Electronically Signed and Approved By: IZA ARREGUIN MD on 11/22/2022 at 7:28                      Assessment and Plan     Diagnoses and all orders for this visit:    1. Arthritis of left acromioclavicular joint (Primary)        Discussed the treatment plan with the patient. Discussed conservative measures as exercises, anti-inflammatory and injection. She is to get a MRI to the elbow and shoulder to assess further medical intervention.     Call or return if worsening symptoms.    Follow Up     After MRI      Patient was given instructions and counseling regarding her condition or for health maintenance advice. Please see specific information pulled into the AVS if appropriate.     Scribed for Derrick Cano MD by  Orin Parham MA.  12/07/22   14:15 EST      I have personally performed the services described in this document as scribed by the above individual and it is both accurate and complete. Derrick Cano MD 12/07/22

## 2022-12-08 ENCOUNTER — TELEPHONE (OUTPATIENT)
Dept: ORTHOPEDIC SURGERY | Facility: CLINIC | Age: 72
End: 2022-12-08

## 2022-12-08 DIAGNOSIS — M19.012 ARTHRITIS OF LEFT ACROMIOCLAVICULAR JOINT: Primary | ICD-10-CM

## 2022-12-08 RX ORDER — DIAZEPAM 5 MG/1
TABLET ORAL
Qty: 2 TABLET | Refills: 0 | Status: SHIPPED | OUTPATIENT
Start: 2022-12-08 | End: 2023-02-28

## 2022-12-08 NOTE — TELEPHONE ENCOUNTER
Provider: DR AVINA    Caller: SOILA MAN    Relationship to Patient: SELF    Pharmacy: [facility], [facility]    Phone Number: [phone]    Reason for Call: PATIENT STATED THAT SHE NEEDS SOMETHING TO HELP HER RELAX FOR HER MRI THAT IS SCHEDULED 01/06/2023    PLEASE CALL PATIENT WHEN MEDICATION HAS BEEN SENT TO PHARMACY. THANK YOU

## 2022-12-23 PROBLEM — J40 BRONCHITIS: Status: ACTIVE | Noted: 2022-12-23

## 2023-01-06 ENCOUNTER — HOSPITAL ENCOUNTER (OUTPATIENT)
Dept: MRI IMAGING | Facility: HOSPITAL | Age: 73
Discharge: HOME | End: 2023-01-06
Payer: MEDICARE

## 2023-01-06 DIAGNOSIS — M25.522 LEFT ELBOW PAIN: ICD-10-CM

## 2023-01-06 DIAGNOSIS — M19.012 ARTHRITIS OF LEFT ACROMIOCLAVICULAR JOINT: ICD-10-CM

## 2023-01-06 PROCEDURE — 73221 MRI JOINT UPR EXTREM W/O DYE: CPT

## 2023-01-11 ENCOUNTER — OFFICE VISIT (OUTPATIENT)
Dept: ORTHOPEDIC SURGERY | Facility: CLINIC | Age: 73
End: 2023-01-11
Payer: MEDICARE

## 2023-01-11 VITALS — BODY MASS INDEX: 32.65 KG/M2 | WEIGHT: 196 LBS | HEIGHT: 65 IN

## 2023-01-11 DIAGNOSIS — M25.522 LEFT ELBOW PAIN: ICD-10-CM

## 2023-01-11 DIAGNOSIS — M19.012 ARTHRITIS OF LEFT ACROMIOCLAVICULAR JOINT: Primary | ICD-10-CM

## 2023-01-11 PROCEDURE — 99213 OFFICE O/P EST LOW 20 MIN: CPT | Performed by: ORTHOPAEDIC SURGERY

## 2023-01-11 NOTE — PROGRESS NOTES
"Chief Complaint  Follow-up of the Left Shoulder and Follow-up of the Left Elbow     Subjective      Yuliana Da Silva presents to [facility] for follow up of the left shoulder and elbow. She has had pain the last couple of months.  She has pain that hits in her elbow and runs up to the shoulder.  She has had no new injury.  She has difficulty with reaching overhead and has pain with movement. She is here to review MRI results.        Allergies   Allergen Reactions   • Codeine Hives   • Levofloxacin Hives, Diarrhea and Unknown - High Severity   • Sulfamethoxazole-Trimethoprim Diarrhea and Unknown - High Severity   • Tetracycline Hives and Unknown - High Severity   • Tetracyclines & Related GI Intolerance   • Sulfa Antibiotics GI Intolerance        Social History     Socioeconomic History   • Marital status:    Tobacco Use   • Smoking status: Never   • Smokeless tobacco: Never   • Tobacco comments:     Grew up exposed to second hand smoke   Vaping Use   • Vaping Use: Never used   Substance and Sexual Activity   • Alcohol use: Never   • Drug use: Never   • Sexual activity: Defer        Review of Systems     Objective   Vital Signs:   Ht 165.1 cm (65\")   Wt 88.9 kg (196 lb)   BMI 32.62 kg/m²       Physical Exam  Constitutional:       Appearance: Normal appearance. Patient is well-developed and normal weight.   HENT:      Head: Normocephalic.      Right Ear: Hearing and external ear normal.      Left Ear: Hearing and external ear normal.      Nose: Nose normal.   Eyes:      Conjunctiva/sclera: Conjunctivae normal.   Cardiovascular:      Rate and Rhythm: Normal rate.   Pulmonary:      Effort: Pulmonary effort is normal.      Breath sounds: No wheezing or rales.   Abdominal:      Palpations: Abdomen is soft.      Tenderness: There is no abdominal tenderness.   Musculoskeletal:      Cervical back: Normal range of motion.   Skin:     Findings: No rash.   Neurological:      Mental " Status: Patient  is alert and oriented to person, place, and time.   Psychiatric:         Mood and Affect: Mood and affect normal.         Judgment: Judgment normal.       Ortho Exam        LEFT UPPER EXTREMITY radial pulse 2+. Ulnar pulse 2+. Full ROM.  No swelling. No skin discoloration or muscle atrophy. Sensation intact. Neurovascular Intact. Forward flexion full Abduction full. External rotation 50. Internal rotation to SI joint. Negative Cross body adduction. Supraspinatus strength 3+/5. Infraspinatus Strength 3+/5. Infrared subscap 3+/5. Positive Velazco. Positive Neer. Positive Apprehension. Positive Lift off. (Negative Obriens. Sensation intact to light touch, median, radial, ulnar nerve. Positive AIN, PIN, ulnar nerve motor. Positive pulses. Positive Impingement signs. Good strength in triceps, biceps, deltoid, wrist extensors and wrist flexors. Tender to palpation to the AC joint.     Procedures        Imaging Results (Most Recent)     None           Result Review :         MRI Elbow Left Without Contrast    Result Date: 1/9/2023  Narrative: PROCEDURE: MRI ELBOW LEFT WO CONTRAST  COMPARISON:  [facility], CR, XR ELBOW 2 VW LEFT, 11/21/2022, 14:36. INDICATIONS: LEFT ELBOW PAIN WITH PROXIMAL LEFT FOREARM PAIN AND SWELLING, PATIENT STATES ELBOW INTERMITTENTLY LOCKS      TECHNIQUE: A complete multi-planar examination was performed without contrast.   FINDINGS:  The examination is moderately limited by patient motion artifact.  No fracture or malalignment is demonstrated.  Marrow signal appears within normal limits.  The biceps, brachialis, triceps, common extensor and common flexor tendons appear intact.  The radial and ulnar collateral ligaments are intact.  A moderate elbow joint effusion is noted.  Moderate cartilage thinning is noted in the joint.  No loose body is seen.  Mild osteoarthritic spurring is noted.      Impression:   1. Moderate elbow joint effusion 2. Mild-to-moderate  osteoarthritis     Angel Daniels M.D.       Electronically Signed and Approved By: Angel Daniels M.D. on 1/09/2023 at 11:58             MRI Shoulder Left Without Contrast    Result Date: 1/9/2023  Narrative: PROCEDURE: MRI SHOULDER LEFT WO CONTRAST  COMPARISON: [facility], CR, XR SHOULDER 2+ VW LEFT, 11/21/2022, 14:34.  INDICATIONS: LEFT SHOULDER PAIN      TECHNIQUE: A variety of imaging planes and parameters were utilized for visualization of suspected pathology.  Images were performed without contrast.   FINDINGS:  No fracture or malalignment is identified.  Marrow signal appears normal.  Mild acromioclavicular osteoarthritis is noted.  No AC joint effusion is noted.  A type 2 acromion is present.  Small amount of fluid is noted in the subdeltoid/subacromial bursa.  There is an intrasubstance tear of the supraspinatus near the musculotendinous junction estimated to involve approximately 33% of tendon thickness.  There is a partial thickness tear of the distal supraspinatus tendon at the footprint which appears to extend to the articular surface.  It involves approximately 50% of tendon thickness.  The rotator cuff otherwise appears unremarkable.  No muscle body atrophy is seen.  The biceps long head tendon and its attachment to the superior labrum appear intact There is separation of the anterosuperior labrum from the underlying glenoid favored to represent a sub labral foramen.  No labral tear is identified.  No glenohumeral joint effusion or loose body is seen.  Cartilage in the joint is intact.      Impression:   1. Mild subdeltoid/subacromial bursitis 2. Tears of the supraspinatus tendon, as above      Angel Daniels M.D.       Electronically Signed and Approved By: Angel Daniels M.D. on 1/09/2023 at 11:49                      Assessment and Plan     Diagnoses and all orders for this visit:    1. Arthritis of left acromioclavicular joint (Primary)    2. Left elbow pain        Discussed the  treatment plan with the patient. Discussed conservative measures as exercises, anti-inflammatory and injection. She is here to review MRI results.     Call or return if worsening symptoms.    Follow Up     PRN      Patient was given instructions and counseling regarding her condition or for health maintenance advice. Please see specific information pulled into the AVS if appropriate.     Scribed for Derrick Cano MD by Orin Parham MA.  01/11/23   13:09 EST    I have personally performed the services described in this document as scribed by the above individual and it is both accurate and complete. Derrick Cano MD 01/13/23

## 2023-01-27 ENCOUNTER — CLINICAL SUPPORT (OUTPATIENT)
Dept: INTERNAL MEDICINE | Facility: CLINIC | Age: 73
End: 2023-01-27
Payer: MEDICARE

## 2023-01-27 VITALS — SYSTOLIC BLOOD PRESSURE: 158 MMHG | DIASTOLIC BLOOD PRESSURE: 79 MMHG

## 2023-01-28 NOTE — PROGRESS NOTES
[facility]  Cardiology progress Note    Patient Name: Yuliana Da Silva  : 1950    CHIEF COMPLAINT  Palpitations        Subjective   Subjective     HISTORY OF PRESENT ILLNESS    Yuliana Da Silva is a 72 y.o. female with history of palpitations and hypertension.  No further palpitations.    REVIEW OF SYSTEMS    Constitutional:    No fever, no weight loss  Skin:     No rash  Otolaryngeal:    No difficulty swallowing  Cardiovascular: See HPI.  Pulmonary:    No cough, no sputum production    Personal History     Social History:    reports that she has never smoked. She has never used smokeless tobacco. She reports that she does not drink alcohol and does not use drugs.    Home Medications:  Current Outpatient Medications on File Prior to Visit   Medication Sig   • ALBUTEROL SULFATE IN Inhale.   • budesonide-formoterol (Symbicort) 160-4.5 MCG/ACT inhaler Inhale 2 puffs As Needed (Cough).   • cefuroxime (CEFTIN) 250 MG tablet    • cetirizine (zyrTEC) 10 MG tablet Take 1 tablet by mouth Daily.   • Cholecalciferol (Vitamin D) 50 MCG (2000 UT) tablet Take 2,000 Units by mouth 1 (One) Time Per Week.   • cycloSPORINE (RESTASIS) 0.05 % ophthalmic emulsion 1 drop 2 (Two) Times a Day.   • diazePAM (Valium) 5 MG tablet Take one tablet 30 minutes prior to MRI and then repeat if necessary   • Flovent  MCG/ACT inhaler    • fluticasone (FLONASE) 50 MCG/ACT nasal spray    • hydroxychloroquine (PLAQUENIL) 200 MG tablet Take 200 mg by mouth Daily.   • hydroxychloroquine (PLAQUENIL) 200 MG tablet Take 1 tablet by mouth Daily.   • Lubricating Plus Eye Drops 0.5 % solution Administer 1 drop to both eyes Daily.   • metFORMIN HCl  MG/5ML Suspension Reconstituted ER Take 1 tablet by mouth Daily.   • methylPREDNISolone (MEDROL) 4 MG dose pack Take as directed on package instructions.   • metoprolol succinate XL (TOPROL-XL) 25 MG 24 hr tablet Take 25 mg by mouth Daily.   • mometasone (NASONEX) 50  MCG/ACT nasal spray 2 sprays into the nostril(s) as directed by provider As Needed.   • montelukast (Singulair) 10 MG tablet Take 1 tablet by mouth Daily.   • Omega 3-6-9 Fatty Acids (OMEGA 3-6-9 PO) Take 1 capsule by mouth Daily.   • omeprazole (priLOSEC) 20 MG capsule Take 1 capsule by mouth 2 (Two) Times a Day.   • PEG-KCl-NaCl-NaSulf-Na Asc-C (MoviPrep) 100 g reconstituted solution powder Take 1,000 mL by mouth Take As Directed. Take as directed by prescriber's office   • ProAir  (90 Base) MCG/ACT inhaler    • Probiotic Product (PROBIOTIC-10 PO) Take  by mouth Daily.   • promethazine-dextromethorphan (PROMETHAZINE-DM) 6.25-15 MG/5ML syrup take 5ml BY MOUTH EVERY NIGHT AT BEDTIME   • [DISCONTINUED] amLODIPine (NORVASC) 2.5 MG tablet Take 2.5 mg by mouth Daily.   • [DISCONTINUED] hydroCHLOROthiazide (HYDRODIURIL) 12.5 MG tablet Take 12.5 mg by mouth Daily.     No current facility-administered medications on file prior to visit.       Past Medical History:   Diagnosis Date   • Allergic rhinitis 06/04/2013   • Anemia    • Asthma     W/ ACUTE EXACERBATION   • Atypical chest pain    • Cataract    • Cough variant asthma 06/09/2014   • Gastroesophageal reflux disease    • Headache    • Hiatal hernia    • HTN (hypertension)    • Impaired fasting glucose    • Insomnia    • Leukocytopenia    • Lupus (HCC)    • Palpitations    • Pancreatitis     W/ STENT PLACEMENT   • Screening for breast cancer 11/2018    WNL   • SOB (shortness of breath)        Allergies:  Allergies   Allergen Reactions   • Codeine Hives   • Levofloxacin Hives, Diarrhea and Unknown - High Severity   • Sulfamethoxazole-Trimethoprim Diarrhea and Unknown - High Severity   • Tetracycline Hives and Unknown - High Severity   • Tetracyclines & Related GI Intolerance   • Sulfa Antibiotics GI Intolerance       Objective    Objective       Vitals:   Heart Rate:  [85-88] 88  BP: (152-171)/(72-84) 152/84  Body mass index is 32.28 kg/m².     PHYSICAL  EXAM:    General Appearance:   · well developed  · well nourished  HENT:   · oropharynx moist  · lips not cyanotic  Neck:  · thyroid not enlarged  · supple  Respiratory:  · no respiratory distress  · normal breath sounds  · no rales  Cardiovascular:  · no jugular venous distention  · regular rhythm  · apical impulse normal  · S1 normal, S2 normal  · no S3, no S4   · no murmur  · no rub, no thrill  · carotid pulses normal; no bruit  · pedal pulses normal  · lower extremity edema: none    Skin:   · warm, dry  Psychiatric:  · judgement and insight appropriate  · normal mood and affect        Result Review:  I have personally reviewed the available results from  [x]  Laboratory  [x]  EKG  [x]  Cardiology  [x]  Medications  [x]  Old records  []  Other:     Procedures  Lab Results   Component Value Date    CHOL 198 08/25/2022    CHOL 203 (H) 02/28/2022    CHOL 191 08/27/2021     Lab Results   Component Value Date    TRIG 71 08/25/2022    TRIG 54 02/28/2022    TRIG 88 08/27/2021     Lab Results   Component Value Date    HDL 91 (H) 08/25/2022    HDL 89 (H) 02/28/2022    HDL 76 (H) 08/27/2021     Lab Results   Component Value Date    LDL 94 08/25/2022     (H) 02/28/2022    LDL 99 08/27/2021     Lab Results   Component Value Date    VLDL 13 08/25/2022    VLDL 10 02/28/2022    VLDL 16 08/27/2021        Impression/Plan:  1.  Palpitations/sinus tachycardia improved: Continue Toprol-XL 25 mg a day.  No palpitations.  2.  Essential hypertension Uncontrolled: Increase amlodipine to 5 mg a day.  Increase hydrochlorothiazide 25 mg a day.  Monitor blood pressure regularly.           Toby Lin MD   01/31/23   12:26 EST

## 2023-01-31 ENCOUNTER — OFFICE VISIT (OUTPATIENT)
Dept: CARDIOLOGY | Facility: CLINIC | Age: 73
End: 2023-01-31
Payer: MEDICARE

## 2023-01-31 VITALS
HEIGHT: 65 IN | SYSTOLIC BLOOD PRESSURE: 152 MMHG | HEART RATE: 88 BPM | DIASTOLIC BLOOD PRESSURE: 84 MMHG | WEIGHT: 194 LBS | BODY MASS INDEX: 32.32 KG/M2

## 2023-01-31 DIAGNOSIS — I10 HYPERTENSION, ESSENTIAL: ICD-10-CM

## 2023-01-31 DIAGNOSIS — R00.2 PALPITATIONS: Primary | ICD-10-CM

## 2023-01-31 DIAGNOSIS — I10 ESSENTIAL HYPERTENSION: ICD-10-CM

## 2023-01-31 PROCEDURE — 99214 OFFICE O/P EST MOD 30 MIN: CPT | Performed by: SPECIALIST

## 2023-01-31 RX ORDER — AMLODIPINE BESYLATE 5 MG/1
5 TABLET ORAL DAILY
Qty: 90 TABLET | Refills: 5 | Status: SHIPPED | OUTPATIENT
Start: 2023-01-31

## 2023-01-31 RX ORDER — HYDROXYCHLOROQUINE SULFATE 200 MG/1
1 TABLET, FILM COATED ORAL EVERY 24 HOURS
COMMUNITY
Start: 2023-01-26

## 2023-01-31 RX ORDER — HYDROCHLOROTHIAZIDE 25 MG/1
12.5 TABLET ORAL DAILY
Qty: 90 TABLET | Refills: 5 | Status: SHIPPED | OUTPATIENT
Start: 2023-01-31 | End: 2023-03-22 | Stop reason: SDUPTHER

## 2023-02-01 ENCOUNTER — OFFICE VISIT (OUTPATIENT)
Dept: INTERNAL MEDICINE | Facility: CLINIC | Age: 73
End: 2023-02-01
Payer: MEDICARE

## 2023-02-01 VITALS
SYSTOLIC BLOOD PRESSURE: 146 MMHG | BODY MASS INDEX: 32.32 KG/M2 | TEMPERATURE: 97.9 F | WEIGHT: 194 LBS | HEIGHT: 65 IN | DIASTOLIC BLOOD PRESSURE: 76 MMHG | OXYGEN SATURATION: 98 % | HEART RATE: 90 BPM

## 2023-02-01 DIAGNOSIS — I10 ESSENTIAL HYPERTENSION: Primary | ICD-10-CM

## 2023-02-01 DIAGNOSIS — M32.9 SYSTEMIC LUPUS ERYTHEMATOSUS, UNSPECIFIED SLE TYPE, UNSPECIFIED ORGAN INVOLVEMENT STATUS: ICD-10-CM

## 2023-02-01 DIAGNOSIS — E66.09 CLASS 1 OBESITY DUE TO EXCESS CALORIES WITH SERIOUS COMORBIDITY AND BODY MASS INDEX (BMI) OF 32.0 TO 32.9 IN ADULT: ICD-10-CM

## 2023-02-01 DIAGNOSIS — R73.01 IMPAIRED FASTING GLUCOSE: ICD-10-CM

## 2023-02-01 PROBLEM — E66.811 CLASS 1 OBESITY DUE TO EXCESS CALORIES WITH SERIOUS COMORBIDITY AND BODY MASS INDEX (BMI) OF 32.0 TO 32.9 IN ADULT: Status: ACTIVE | Noted: 2023-02-01

## 2023-02-01 LAB
ALBUMIN SERPL-MCNC: 4.5 G/DL (ref 3.5–5.2)
ALBUMIN/GLOB SERPL: 1.6 G/DL
ALP SERPL-CCNC: 74 U/L (ref 39–117)
ALT SERPL W P-5'-P-CCNC: 23 U/L (ref 1–33)
ANION GAP SERPL CALCULATED.3IONS-SCNC: 11 MMOL/L (ref 5–15)
AST SERPL-CCNC: 25 U/L (ref 1–32)
BASOPHILS # BLD AUTO: 0.06 10*3/MM3 (ref 0–0.2)
BASOPHILS NFR BLD AUTO: 1.4 % (ref 0–1.5)
BILIRUB SERPL-MCNC: 0.3 MG/DL (ref 0–1.2)
BUN SERPL-MCNC: 11 MG/DL (ref 8–23)
BUN/CREAT SERPL: 12.6 (ref 7–25)
CALCIUM SPEC-SCNC: 9.6 MG/DL (ref 8.6–10.5)
CHLORIDE SERPL-SCNC: 106 MMOL/L (ref 98–107)
CHOLEST SERPL-MCNC: 205 MG/DL (ref 0–200)
CO2 SERPL-SCNC: 28 MMOL/L (ref 22–29)
CREAT SERPL-MCNC: 0.87 MG/DL (ref 0.57–1)
DEPRECATED RDW RBC AUTO: 39.5 FL (ref 37–54)
EGFRCR SERPLBLD CKD-EPI 2021: 70.5 ML/MIN/1.73
EOSINOPHIL # BLD AUTO: 0.48 10*3/MM3 (ref 0–0.4)
EOSINOPHIL NFR BLD AUTO: 10.9 % (ref 0.3–6.2)
ERYTHROCYTE [DISTWIDTH] IN BLOOD BY AUTOMATED COUNT: 12.7 % (ref 12.3–15.4)
GLOBULIN UR ELPH-MCNC: 2.9 GM/DL
GLUCOSE SERPL-MCNC: 115 MG/DL (ref 65–99)
HBA1C MFR BLD: 6.1 % (ref 4.8–5.6)
HCT VFR BLD AUTO: 38.4 % (ref 34–46.6)
HDLC SERPL-MCNC: 95 MG/DL (ref 40–60)
HGB BLD-MCNC: 12.7 G/DL (ref 12–15.9)
IMM GRANULOCYTES # BLD AUTO: 0.01 10*3/MM3 (ref 0–0.05)
IMM GRANULOCYTES NFR BLD AUTO: 0.2 % (ref 0–0.5)
LDLC SERPL CALC-MCNC: 94 MG/DL (ref 0–100)
LDLC/HDLC SERPL: 0.96 {RATIO}
LYMPHOCYTES # BLD AUTO: 2.22 10*3/MM3 (ref 0.7–3.1)
LYMPHOCYTES NFR BLD AUTO: 50.5 % (ref 19.6–45.3)
MCH RBC QN AUTO: 28.7 PG (ref 26.6–33)
MCHC RBC AUTO-ENTMCNC: 33.1 G/DL (ref 31.5–35.7)
MCV RBC AUTO: 86.9 FL (ref 79–97)
MONOCYTES # BLD AUTO: 0.37 10*3/MM3 (ref 0.1–0.9)
MONOCYTES NFR BLD AUTO: 8.4 % (ref 5–12)
NEUTROPHILS NFR BLD AUTO: 1.26 10*3/MM3 (ref 1.7–7)
NEUTROPHILS NFR BLD AUTO: 28.6 % (ref 42.7–76)
NRBC BLD AUTO-RTO: 0 /100 WBC (ref 0–0.2)
PLATELET # BLD AUTO: 230 10*3/MM3 (ref 140–450)
PMV BLD AUTO: 10.8 FL (ref 6–12)
POTASSIUM SERPL-SCNC: 3.7 MMOL/L (ref 3.5–5.2)
PROT SERPL-MCNC: 7.4 G/DL (ref 6–8.5)
RBC # BLD AUTO: 4.42 10*6/MM3 (ref 3.77–5.28)
SODIUM SERPL-SCNC: 145 MMOL/L (ref 136–145)
TRIGL SERPL-MCNC: 95 MG/DL (ref 0–150)
TSH SERPL DL<=0.05 MIU/L-ACNC: 1.61 UIU/ML (ref 0.27–4.2)
VLDLC SERPL-MCNC: 16 MG/DL (ref 5–40)
WBC NRBC COR # BLD: 4.4 10*3/MM3 (ref 3.4–10.8)

## 2023-02-01 PROCEDURE — 85025 COMPLETE CBC W/AUTO DIFF WBC: CPT | Performed by: INTERNAL MEDICINE

## 2023-02-01 PROCEDURE — 99214 OFFICE O/P EST MOD 30 MIN: CPT | Performed by: INTERNAL MEDICINE

## 2023-02-01 PROCEDURE — 36415 COLL VENOUS BLD VENIPUNCTURE: CPT | Performed by: INTERNAL MEDICINE

## 2023-02-01 PROCEDURE — 80061 LIPID PANEL: CPT | Performed by: INTERNAL MEDICINE

## 2023-02-01 PROCEDURE — 80053 COMPREHEN METABOLIC PANEL: CPT | Performed by: INTERNAL MEDICINE

## 2023-02-01 PROCEDURE — 83036 HEMOGLOBIN GLYCOSYLATED A1C: CPT | Performed by: INTERNAL MEDICINE

## 2023-02-01 PROCEDURE — 84443 ASSAY THYROID STIM HORMONE: CPT | Performed by: INTERNAL MEDICINE

## 2023-02-01 NOTE — ASSESSMENT & PLAN NOTE
Slightly elevated today in clinic, improving per patient. Elevation related to recent steroid use.   Seen by cardiology yesterday and medications adjusted.

## 2023-02-01 NOTE — PROGRESS NOTES
"Chief Complaint  Follow-up (Follow up and blood pressure has been running high the last 2 weeks )    Subjective       Yuliana Da Silva presents to [facility]    HPI   Presenting for follow up.     HTN:  Not well controlled today, improving per patient. Has had elevated readings since being on steroids for flare of COPD. Has now completed course of oral steroids.   Medications adjusted yesterday at cardiology, denies headache, chest pain, dizziness, vision changes.    Impaired Fasting Glucose: well controlled on last labs, tried taking metformin twice a day but could not tolerate due to GI side effects. Has gone back to once a day and is tolerating this well.     Objective     Vitals:    02/01/23 0958   BP: 146/76   BP Location: Right arm   Patient Position: Sitting   Cuff Size: Adult   Pulse: 90   Temp: 97.9 °F (36.6 °C)   TempSrc: Temporal   SpO2: 98%   Weight: 88 kg (194 lb)   Height: 165.1 cm (65\")      Wt Readings from Last 3 Encounters:   02/01/23 88 kg (194 lb)   01/31/23 88 kg (194 lb)   01/11/23 88.9 kg (196 lb)      BP Readings from Last 3 Encounters:   02/01/23 146/76   01/31/23 152/84   01/27/23 158/79        Body mass index is 32.28 kg/m².    BMI is >= 30 and <35. (Class 1 Obesity). The following options were offered after discussion;: exercise counseling/recommendations and nutrition counseling/recommendations       Physical Exam  Vitals reviewed.   Constitutional:       Appearance: Normal appearance. She is well-developed.   HENT:      Head: Normocephalic and atraumatic.      Mouth/Throat:      Pharynx: No oropharyngeal exudate.   Eyes:      Conjunctiva/sclera: Conjunctivae normal.      Pupils: Pupils are equal, round, and reactive to light.   Cardiovascular:      Rate and Rhythm: Normal rate and regular rhythm.      Heart sounds: No murmur heard.    No friction rub. No gallop.   Pulmonary:      Effort: Pulmonary effort is normal.      Breath " sounds: Normal breath sounds. No wheezing or rhonchi.   Skin:     General: Skin is warm and dry.   Neurological:      Mental Status: She is alert and oriented to person, place, and time.   Psychiatric:         Mood and Affect: Affect normal.          Result Review :   The following data was reviewed by: Elicia Betts MD on 02/01/2023:  CMP    CMP 2/28/22 8/25/22   Glucose 87 94   BUN 11 11   Creatinine 0.83 0.83   eGFR 75.0 75.0   Sodium 144 141   Potassium 3.5 3.9   Chloride 105 105   Calcium 9.6 9.2   Total Protein 7.8 7.0   Albumin 4.90 4.40   Globulin 2.9 2.6   Total Bilirubin 0.5 0.4   Alkaline Phosphatase 78 70   AST (SGOT) 30 29   ALT (SGPT) 28 23   Albumin/Globulin Ratio 1.7 1.7   BUN/Creatinine Ratio 13.3 13.3   Anion Gap 12.0 12.3      Comments are available for some flowsheets but are not being displayed.           CBC    CBC 4/22/22 8/25/22 8/25/22     0956 0956   WBC 4.81 5.03 4.80   RBC 4.56 4.41 4.37   Hemoglobin 13.0 12.9 12.8   Hematocrit 40.2 39.5 39.0   MCV 88.2 89.6 89.2   MCH 28.5 29.3 29.3   MCHC 32.3 32.7 32.8   RDW 12.9 13.2 13.1   Platelets 243 245 243           Lipid Panel    Lipid Panel 2/28/22 8/25/22   Total Cholesterol 203 (A) 198   Triglycerides 54 71   HDL Cholesterol 89 (A) 91 (A)   VLDL Cholesterol 10 13   LDL Cholesterol  104 (A) 94   LDL/HDL Ratio 1.16 1.02   (A) Abnormal value                A1C Last 3 Results    HGBA1C Last 3 Results 2/28/22 8/25/22   Hemoglobin A1C 6.00 (A) 6.30 (A)   (A) Abnormal value                    Procedures    Assessment and Plan   Diagnoses and all orders for this visit:    1. Essential hypertension (Primary)  Assessment & Plan:  Slightly elevated today in clinic, improving per patient. Elevation related to recent steroid use.   Seen by cardiology yesterday and medications adjusted.       Orders:  -     TSH    2. Impaired fasting glucose  Assessment & Plan:  Well controlled  Tolerating metformin once per day  Checking follow up labs  today    Orders:  -     CBC & Differential  -     Comprehensive Metabolic Panel  -     Hemoglobin A1c  -     Lipid Panel    3. Systemic lupus erythematosus, unspecified SLE type, unspecified organ involvement status (HCC)    4. Weight gain    5. Class 1 obesity due to excess calories with serious comorbidity and body mass index (BMI) of 32.0 to 32.9 in adult  -     Lipid Panel          Follow Up   Return in about 3 months (around 5/1/2023) for Next scheduled follow up.  Patient was given instructions and counseling regarding her condition or for health maintenance advice. Please see specific information pulled into the AVS if appropriate.

## 2023-02-15 ENCOUNTER — TELEPHONE (OUTPATIENT)
Dept: GASTROENTEROLOGY | Facility: CLINIC | Age: 73
End: 2023-02-15
Payer: MEDICARE

## 2023-03-01 ENCOUNTER — HOSPITAL ENCOUNTER (OUTPATIENT)
Facility: HOSPITAL | Age: 73
Setting detail: HOSPITAL OUTPATIENT SURGERY
Discharge: HOME | End: 2023-03-01
Attending: INTERNAL MEDICINE | Admitting: INTERNAL MEDICINE
Payer: MEDICARE

## 2023-03-01 ENCOUNTER — ANESTHESIA (OUTPATIENT)
Dept: GASTROENTEROLOGY | Facility: HOSPITAL | Age: 73
End: 2023-03-01
Payer: MEDICARE

## 2023-03-01 ENCOUNTER — ANESTHESIA EVENT (OUTPATIENT)
Dept: GASTROENTEROLOGY | Facility: HOSPITAL | Age: 73
End: 2023-03-01
Payer: MEDICARE

## 2023-03-01 VITALS
HEART RATE: 88 BPM | OXYGEN SATURATION: 98 % | TEMPERATURE: 96.9 F | HEIGHT: 65 IN | WEIGHT: 189.15 LBS | DIASTOLIC BLOOD PRESSURE: 86 MMHG | BODY MASS INDEX: 31.52 KG/M2 | RESPIRATION RATE: 17 BRPM | SYSTOLIC BLOOD PRESSURE: 109 MMHG

## 2023-03-01 DIAGNOSIS — Z12.11 COLON CANCER SCREENING: ICD-10-CM

## 2023-03-01 PROCEDURE — 25010000002 PROPOFOL 10 MG/ML EMULSION: Performed by: NURSE ANESTHETIST, CERTIFIED REGISTERED

## 2023-03-01 PROCEDURE — 45380 COLONOSCOPY AND BIOPSY: CPT | Performed by: INTERNAL MEDICINE

## 2023-03-01 PROCEDURE — 88305 TISSUE EXAM BY PATHOLOGIST: CPT | Performed by: INTERNAL MEDICINE

## 2023-03-01 RX ORDER — PROPOFOL 10 MG/ML
VIAL (ML) INTRAVENOUS AS NEEDED
Status: DISCONTINUED | OUTPATIENT
Start: 2023-03-01 | End: 2023-03-01 | Stop reason: SURG

## 2023-03-01 RX ORDER — SODIUM CHLORIDE, SODIUM LACTATE, POTASSIUM CHLORIDE, CALCIUM CHLORIDE 600; 310; 30; 20 MG/100ML; MG/100ML; MG/100ML; MG/100ML
30 INJECTION, SOLUTION INTRAVENOUS CONTINUOUS
Status: DISCONTINUED | OUTPATIENT
Start: 2023-03-01 | End: 2023-03-01 | Stop reason: HOSPADM

## 2023-03-01 RX ORDER — GLYCOPYRROLATE 0.2 MG/ML
INJECTION INTRAMUSCULAR; INTRAVENOUS AS NEEDED
Status: DISCONTINUED | OUTPATIENT
Start: 2023-03-01 | End: 2023-03-01 | Stop reason: SURG

## 2023-03-01 RX ORDER — LIDOCAINE HYDROCHLORIDE 20 MG/ML
INJECTION, SOLUTION EPIDURAL; INFILTRATION; INTRACAUDAL; PERINEURAL AS NEEDED
Status: DISCONTINUED | OUTPATIENT
Start: 2023-03-01 | End: 2023-03-01 | Stop reason: SURG

## 2023-03-01 RX ADMIN — LIDOCAINE HYDROCHLORIDE 100 MG: 20 INJECTION, SOLUTION EPIDURAL; INFILTRATION; INTRACAUDAL; PERINEURAL at 08:03

## 2023-03-01 RX ADMIN — GLYCOPYRROLATE 0.2 MG: 0.2 INJECTION INTRAMUSCULAR; INTRAVENOUS at 08:01

## 2023-03-01 RX ADMIN — SODIUM CHLORIDE, POTASSIUM CHLORIDE, SODIUM LACTATE AND CALCIUM CHLORIDE: 600; 310; 30; 20 INJECTION, SOLUTION INTRAVENOUS at 08:06

## 2023-03-01 RX ADMIN — PROPOFOL 200 MCG/KG/MIN: 10 INJECTION, EMULSION INTRAVENOUS at 08:03

## 2023-03-01 RX ADMIN — PROPOFOL 100 MG: 10 INJECTION, EMULSION INTRAVENOUS at 08:03

## 2023-03-01 NOTE — ANESTHESIA POSTPROCEDURE EVALUATION
Patient: Yuliana Da Silva    Procedure Summary     Date: 03/01/23 Room / Location: [facility] ENDOSCOPY 2 / [facility] ENDOSCOPY    Anesthesia Start: 0801 Anesthesia Stop: 0825    Procedure: COLONOSCOPY WITH POLYPECTOMY Diagnosis:       Colon cancer screening      (Colon cancer screening [Z12.11])    Surgeons: Risa Ocampo MD Provider: Reyes, Mirabelle, DO    Anesthesia Type: general ASA Status: 3          Anesthesia Type: general    Vitals  Vitals Value Taken Time   /79 03/01/23 0844   Temp 36.1 °C (96.9 °F) 03/01/23 0823   Pulse 87 03/01/23 0844   Resp 17 03/01/23 0838   SpO2 100 % 03/01/23 0844   Vitals shown include unvalidated device data.        Post Anesthesia Care and Evaluation    Patient location during evaluation: bedside  Patient participation: complete - patient participated  Level of consciousness: awake  Pain management: adequate    Airway patency: patent  Anesthetic complications: No anesthetic complications  PONV Status: none  Cardiovascular status: acceptable and stable  Respiratory status: acceptable  Hydration status: acceptable    Comments: An Anesthesiologist personally participated in the most demanding procedures (including induction and emergence if applicable) in the anesthesia plan, monitored the course of anesthesia administration at frequent intervals and remained physically present and available for immediate diagnosis and treatment of emergencies.

## 2023-03-01 NOTE — ANESTHESIA PREPROCEDURE EVALUATION
Anesthesia Evaluation     Patient summary reviewed and Nursing notes reviewed   no history of anesthetic complications:  NPO Solid Status: > 8 hours  NPO Liquid Status: > 2 hours           Airway   Mallampati: II  TM distance: >3 FB  Neck ROM: full  No difficulty expected  Dental          Pulmonary - normal exam    breath sounds clear to auscultation  (+) asthma,shortness of breath,   Cardiovascular - normal exam  Exercise tolerance: good (4-7 METS)    Rhythm: regular  Rate: normal    (+) hypertension well controlled 2 medications or greater,       Neuro/Psych  (+) headaches,    GI/Hepatic/Renal/Endo    (+) obesity,  hiatal hernia, GERD,      ROS Comment: Chronic pancreatitis    Musculoskeletal     (+) arthralgias,       ROS comment: fibromyalgia  Abdominal    Substance History - negative use     OB/GYN negative ob/gyn ROS         Other   autoimmune disease lupus,      ROS/Med Hx Other: PAT Nursing Notes unavailable.                   Anesthesia Plan    ASA 3     general     (Patient understands anesthesia not responsible for dental damage.)  intravenous induction     Anesthetic plan, risks, benefits, and alternatives have been provided, discussed and informed consent has been obtained with: patient.    Use of blood products discussed with patient  Consented to blood products.   Plan discussed with CRNA.        CODE STATUS:

## 2023-03-01 NOTE — H&P
Pre Procedure History & Physical    Chief Complaint:   Screening colonoscopy    Subjective     HPI:   71 yo F here for screening colonoscopy.    Past Medical History:   Past Medical History:   Diagnosis Date   • Allergic rhinitis 06/04/2013   • Anemia    • Asthma     W/ ACUTE EXACERBATION   • Atypical chest pain    • Cataract    • Chronic bronchitis (HCC)    • Cough variant asthma 06/09/2014   • Dry eye    • Gastroesophageal reflux disease    • Headache    • Hiatal hernia    • HTN (hypertension)    • Impaired fasting glucose    • Insomnia    • Leukocytopenia    • Lupus (HCC)    • Palpitations    • Pancreatitis     W/ STENT PLACEMENT   • Screening for breast cancer 11/2018    WNL   • SOB (shortness of breath)        Past Surgical History:  Past Surgical History:   Procedure Laterality Date   • COLONOSCOPY  2008   • ENDOSCOPY  2004   • HYSTERECTOMY  1998    FOR FIBROIDS. BOTH ovaries removed        Family History:  Family History   Problem Relation Age of Onset   • Hypertension Mother    • Diabetes Father    • Lung cancer Brother    • Malig Hyperthermia Neg Hx    • Colon cancer Neg Hx        Social History:   reports that she has never smoked. She has never used smokeless tobacco. She reports that she does not drink alcohol and does not use drugs.    Medications:   Medications Prior to Admission   Medication Sig Dispense Refill Last Dose   • ALBUTEROL SULFATE IN Inhale 2 puffs As Needed.      • amLODIPine (NORVASC) 5 MG tablet Take 1 tablet by mouth Daily. (Patient taking differently: Take 2 tablets by mouth Daily.) 90 tablet 5    • budesonide-formoterol (Symbicort) 160-4.5 MCG/ACT inhaler Inhale 2 puffs As Needed (Cough). 1 each 12    • cefuroxime (CEFTIN) 250 MG tablet Take 1 tablet by mouth As Needed.      • cetirizine (zyrTEC) 10 MG tablet Take 1 tablet by mouth Daily. 90 tablet 1    • Cholecalciferol (Vitamin D) 50 MCG (2000 UT) tablet Take 2,000 Units by mouth 1 (One) Time Per Week.      • cycloSPORINE  "(RESTASIS) 0.05 % ophthalmic emulsion 1 drop 2 (Two) Times a Day.      • Flovent  MCG/ACT inhaler Inhale 2 puffs 2 (Two) Times a Day.      • fluticasone (FLONASE) 50 MCG/ACT nasal spray 2 sprays into the nostril(s) as directed by provider 2 (Two) Times a Day.      • hydroCHLOROthiazide (HYDRODIURIL) 25 MG tablet Take 0.5 tablets by mouth Daily. (Patient taking differently: Take 1 tablet by mouth Daily.) 90 tablet 5    • hydroxychloroquine (PLAQUENIL) 200 MG tablet Take 1 tablet by mouth Daily.      • Lubricating Plus Eye Drops 0.5 % solution Administer 1 drop to both eyes Daily.      • metFORMIN ER (GLUCOPHAGE-XR) 500 MG 24 hr tablet Take 1 tablet by mouth Every Morning.      • metoprolol succinate XL (TOPROL-XL) 25 MG 24 hr tablet Take 1 tablet by mouth Daily.      • mometasone (NASONEX) 50 MCG/ACT nasal spray 2 sprays into the nostril(s) as directed by provider As Needed.      • montelukast (Singulair) 10 MG tablet Take 1 tablet by mouth Daily. 90 tablet 1    • Omega 3-6-9 Fatty Acids (OMEGA 3-6-9 PO) Take 1 capsule by mouth Daily.      • omeprazole (priLOSEC) 20 MG capsule Take 1 capsule by mouth 2 (Two) Times a Day. (Patient taking differently: Take 1 capsule by mouth As Needed.) 180 capsule 1    • ProAir  (90 Base) MCG/ACT inhaler Inhale 2 puffs As Needed.      • Probiotic Product (PROBIOTIC-10 PO) Take  by mouth Daily.      • PEG-KCl-NaCl-NaSulf-Na Asc-C (MoviPrep) 100 g reconstituted solution powder Take 1,000 mL by mouth Take As Directed. Take as directed by prescriber's office 1000 mL 0    • promethazine-dextromethorphan (PROMETHAZINE-DM) 6.25-15 MG/5ML syrup take 5ml BY MOUTH EVERY NIGHT AT BEDTIME          Allergies:  Codeine, Levofloxacin, Sulfamethoxazole-trimethoprim, Tetracycline, Tetracyclines & related, and Sulfa antibiotics    ROS:    Pertinent items are noted in HPI     Objective     Height 165.1 cm (65\"), weight 85.8 kg (189 lb 2.5 oz), not currently breastfeeding.    Physical Exam "   Constitutional: Pt is oriented to person, place, and time and well-developed, well-nourished, and in no distress.   Mouth/Throat: Oropharynx is clear and moist.   Neck: Normal range of motion.   Cardiovascular: Normal rate, regular rhythm and normal heart sounds.    Pulmonary/Chest: Effort normal and breath sounds normal.   Abdominal: Soft. Nontender  Skin: Skin is warm and dry.   Psychiatric: Mood, memory, affect and judgment normal.     Assessment & Plan     Diagnosis:  Screening colonoscopy    Anticipated Surgical Procedure:  Colonoscopy    The risks, benefits, and alternatives of this procedure have been discussed with the patient or the responsible party- the patient understands and agrees to proceed.

## 2023-03-02 ENCOUNTER — TELEPHONE (OUTPATIENT)
Dept: GASTROENTEROLOGY | Facility: CLINIC | Age: 73
End: 2023-03-02
Payer: MEDICARE

## 2023-03-02 RX ORDER — CETIRIZINE HYDROCHLORIDE 10 MG/1
10 TABLET ORAL DAILY
Qty: 90 TABLET | Refills: 1 | Status: SHIPPED | OUTPATIENT
Start: 2023-03-02

## 2023-03-02 NOTE — TELEPHONE ENCOUNTER
Caller: IVÁN MAN SR    Relationship: Emergency Contact    Best call back number: [phone]    Requested Prescriptions:   Requested Prescriptions     Pending Prescriptions Disp Refills   • cetirizine (zyrTEC) 10 MG tablet 90 tablet 1     Sig: Take 1 tablet by mouth Daily.        Pharmacy where request should be sent: [facility] [place], [place] - [address] - [phone] [facility] [phone] FX     Does the patient have less than a 3 day supply:  [x] Yes  [] No    Would you like a call back once the refill request has been completed: [x] Yes [] No    If the office needs to give you a call back, can they leave a voicemail: [x] Yes [] No

## 2023-03-02 NOTE — TELEPHONE ENCOUNTER
----- Message from ABI Rolle sent at 3/2/2023 12:42 PM EST -----  Please place in recall for repeat colonoscopy in 5 years.  Send letter to patient and PCP.

## 2023-03-07 ENCOUNTER — HOSPITAL ENCOUNTER (OUTPATIENT)
Dept: MAMMOGRAPHY | Facility: HOSPITAL | Age: 73
Discharge: HOME | End: 2023-03-07
Admitting: INTERNAL MEDICINE
Payer: MEDICARE

## 2023-03-07 DIAGNOSIS — Z92.89 HISTORY OF MAMMOGRAPHY, SCREENING: ICD-10-CM

## 2023-03-07 PROCEDURE — 77063 BREAST TOMOSYNTHESIS BI: CPT

## 2023-03-07 PROCEDURE — 77067 SCR MAMMO BI INCL CAD: CPT

## 2023-03-22 RX ORDER — HYDROCHLOROTHIAZIDE 25 MG/1
25 TABLET ORAL DAILY
Qty: 90 TABLET | Refills: 3 | Status: SHIPPED | OUTPATIENT
Start: 2023-03-22 | End: 2023-03-23 | Stop reason: SDUPTHER

## 2023-03-23 RX ORDER — HYDROCHLOROTHIAZIDE 25 MG/1
25 TABLET ORAL DAILY
Qty: 90 TABLET | Refills: 3 | Status: SHIPPED | OUTPATIENT
Start: 2023-03-23

## 2023-05-01 ENCOUNTER — OFFICE VISIT (OUTPATIENT)
Dept: INTERNAL MEDICINE | Facility: CLINIC | Age: 73
End: 2023-05-01
Payer: MEDICARE

## 2023-05-01 VITALS
OXYGEN SATURATION: 100 % | WEIGHT: 194.25 LBS | TEMPERATURE: 97.8 F | RESPIRATION RATE: 18 BRPM | DIASTOLIC BLOOD PRESSURE: 72 MMHG | HEART RATE: 77 BPM | BODY MASS INDEX: 32.36 KG/M2 | HEIGHT: 65 IN | SYSTOLIC BLOOD PRESSURE: 124 MMHG

## 2023-05-01 DIAGNOSIS — M79.7 FIBROMYALGIA: ICD-10-CM

## 2023-05-01 DIAGNOSIS — R30.0 DYSURIA: ICD-10-CM

## 2023-05-01 DIAGNOSIS — R73.01 IMPAIRED FASTING GLUCOSE: Primary | ICD-10-CM

## 2023-05-01 DIAGNOSIS — I10 ESSENTIAL HYPERTENSION: ICD-10-CM

## 2023-05-01 DIAGNOSIS — M32.9 SYSTEMIC LUPUS ERYTHEMATOSUS, UNSPECIFIED SLE TYPE, UNSPECIFIED ORGAN INVOLVEMENT STATUS: ICD-10-CM

## 2023-05-01 LAB
ALBUMIN SERPL-MCNC: 4.7 G/DL (ref 3.5–5.2)
ALBUMIN UR-MCNC: <1.2 MG/DL
ALBUMIN/GLOB SERPL: 1.6 G/DL
ALP SERPL-CCNC: 74 U/L (ref 39–117)
ALT SERPL W P-5'-P-CCNC: 27 U/L (ref 1–33)
ANION GAP SERPL CALCULATED.3IONS-SCNC: 16.4 MMOL/L (ref 5–15)
AST SERPL-CCNC: 32 U/L (ref 1–32)
BILIRUB SERPL-MCNC: 0.4 MG/DL (ref 0–1.2)
BILIRUB UR QL STRIP: NEGATIVE
BUN SERPL-MCNC: 13 MG/DL (ref 8–23)
BUN/CREAT SERPL: 17.1 (ref 7–25)
BURR CELLS BLD QL SMEAR: ABNORMAL
CALCIUM SPEC-SCNC: 10.1 MG/DL (ref 8.6–10.5)
CHLORIDE SERPL-SCNC: 103 MMOL/L (ref 98–107)
CHOLEST SERPL-MCNC: 186 MG/DL (ref 0–200)
CLARITY UR: CLEAR
CLUMPED PLATELETS: PRESENT
CO2 SERPL-SCNC: 24.6 MMOL/L (ref 22–29)
COLOR UR: YELLOW
CREAT SERPL-MCNC: 0.76 MG/DL (ref 0.57–1)
DACRYOCYTES BLD QL SMEAR: ABNORMAL
DEPRECATED RDW RBC AUTO: 40 FL (ref 37–54)
EGFRCR SERPLBLD CKD-EPI 2021: 82.9 ML/MIN/1.73
EOSINOPHIL # BLD MANUAL: 0.34 10*3/MM3 (ref 0–0.4)
EOSINOPHIL NFR BLD MANUAL: 6.3 % (ref 0.3–6.2)
ERYTHROCYTE [DISTWIDTH] IN BLOOD BY AUTOMATED COUNT: 12.6 % (ref 12.3–15.4)
GLOBULIN UR ELPH-MCNC: 3 GM/DL
GLUCOSE SERPL-MCNC: 79 MG/DL (ref 65–99)
GLUCOSE UR STRIP-MCNC: NEGATIVE MG/DL
HBA1C MFR BLD: 6 % (ref 4.8–5.6)
HCT VFR BLD AUTO: 38.6 % (ref 34–46.6)
HDLC SERPL-MCNC: 87 MG/DL (ref 40–60)
HGB BLD-MCNC: 13 G/DL (ref 12–15.9)
HGB UR QL STRIP.AUTO: NEGATIVE
KETONES UR QL STRIP: NEGATIVE
LDLC SERPL CALC-MCNC: 73 MG/DL (ref 0–100)
LDLC/HDLC SERPL: 0.77 {RATIO}
LEUKOCYTE ESTERASE UR QL STRIP.AUTO: NEGATIVE
LYMPHOCYTES # BLD MANUAL: 3.56 10*3/MM3 (ref 0.7–3.1)
LYMPHOCYTES NFR BLD MANUAL: 3.8 % (ref 5–12)
MCH RBC QN AUTO: 29.4 PG (ref 26.6–33)
MCHC RBC AUTO-ENTMCNC: 33.7 G/DL (ref 31.5–35.7)
MCV RBC AUTO: 87.3 FL (ref 79–97)
MONOCYTES # BLD: 0.2 10*3/MM3 (ref 0.1–0.9)
NEUTROPHILS # BLD AUTO: 1.28 10*3/MM3 (ref 1.7–7)
NEUTROPHILS NFR BLD MANUAL: 23.8 % (ref 42.7–76)
NITRITE UR QL STRIP: NEGATIVE
NRBC BLD AUTO-RTO: 0 /100 WBC (ref 0–0.2)
PH UR STRIP.AUTO: 7 [PH] (ref 5–8)
PLATELET # BLD AUTO: 241 10*3/MM3 (ref 140–450)
PMV BLD AUTO: 10.6 FL (ref 6–12)
POIKILOCYTOSIS BLD QL SMEAR: ABNORMAL
POTASSIUM SERPL-SCNC: 3.9 MMOL/L (ref 3.5–5.2)
PROT SERPL-MCNC: 7.7 G/DL (ref 6–8.5)
PROT UR QL STRIP: NEGATIVE
RBC # BLD AUTO: 4.42 10*6/MM3 (ref 3.77–5.28)
SMUDGE CELLS BLD QL SMEAR: ABNORMAL
SODIUM SERPL-SCNC: 144 MMOL/L (ref 136–145)
SP GR UR STRIP: 1.01 (ref 1–1.03)
TRIGL SERPL-MCNC: 161 MG/DL (ref 0–150)
TSH SERPL DL<=0.05 MIU/L-ACNC: 1.64 UIU/ML (ref 0.27–4.2)
UROBILINOGEN UR QL STRIP: NORMAL
VARIANT LYMPHS NFR BLD MANUAL: 66.3 % (ref 19.6–45.3)
VLDLC SERPL-MCNC: 26 MG/DL (ref 5–40)
WBC NRBC COR # BLD: 5.37 10*3/MM3 (ref 3.4–10.8)

## 2023-05-01 PROCEDURE — 80061 LIPID PANEL: CPT | Performed by: INTERNAL MEDICINE

## 2023-05-01 PROCEDURE — 87086 URINE CULTURE/COLONY COUNT: CPT | Performed by: INTERNAL MEDICINE

## 2023-05-01 PROCEDURE — 85007 BL SMEAR W/DIFF WBC COUNT: CPT | Performed by: INTERNAL MEDICINE

## 2023-05-01 PROCEDURE — 85025 COMPLETE CBC W/AUTO DIFF WBC: CPT | Performed by: INTERNAL MEDICINE

## 2023-05-01 PROCEDURE — 81003 URINALYSIS AUTO W/O SCOPE: CPT | Performed by: INTERNAL MEDICINE

## 2023-05-01 PROCEDURE — 80053 COMPREHEN METABOLIC PANEL: CPT | Performed by: INTERNAL MEDICINE

## 2023-05-01 PROCEDURE — 83036 HEMOGLOBIN GLYCOSYLATED A1C: CPT | Performed by: INTERNAL MEDICINE

## 2023-05-01 PROCEDURE — 82043 UR ALBUMIN QUANTITATIVE: CPT | Performed by: INTERNAL MEDICINE

## 2023-05-01 PROCEDURE — 84443 ASSAY THYROID STIM HORMONE: CPT | Performed by: INTERNAL MEDICINE

## 2023-05-01 RX ORDER — DULOXETIN HYDROCHLORIDE 30 MG/1
30 CAPSULE, DELAYED RELEASE ORAL 2 TIMES DAILY
Qty: 60 CAPSULE | Refills: 0 | Status: SHIPPED | OUTPATIENT
Start: 2023-05-01

## 2023-05-03 PROBLEM — M79.7 FIBROMYALGIA: Status: ACTIVE | Noted: 2023-05-03

## 2023-05-03 LAB — BACTERIA SPEC AEROBE CULT: NO GROWTH

## 2023-05-03 NOTE — PROGRESS NOTES
"Chief Complaint  Follow-up (3 month follow up htn ) and Hypertension    Subjective       Yuliana Da Silva presents to [facility]    HPI   Presenting for follow up of chronic conditions.     HTN:  well controlled today, doing well on medication, denies headache, chest pain, dizziness, vision changes    SLE: followed by rheumatology, on Plaquenil. Was told to follow up with PCP for fibromyalgia symptoms.     C/O increased pain from fibromyalgia. Interfering with sleep. Does not want any medication that is sedating or mind altering.     Objective     Vitals:    05/01/23 1359   BP: 124/72   BP Location: Left arm   Patient Position: Sitting   Cuff Size: Adult   Pulse: 77   Resp: 18   Temp: 97.8 °F (36.6 °C)   TempSrc: Temporal   SpO2: 100%   Weight: 88.1 kg (194 lb 4 oz)   Height: 165.1 cm (65\")      Wt Readings from Last 3 Encounters:   05/01/23 88.1 kg (194 lb 4 oz)   03/17/23 91.4 kg (201 lb 6.4 oz)   03/01/23 85.8 kg (189 lb 2.5 oz)      BP Readings from Last 3 Encounters:   05/01/23 124/72   03/17/23 148/78   03/01/23 109/86        Body mass index is 32.32 kg/m².    BMI is >= 30 and <35. (Class 1 Obesity). The following options were offered after discussion;: exercise counseling/recommendations and nutrition counseling/recommendations       Physical Exam  Vitals reviewed.   Constitutional:       Appearance: Normal appearance. She is well-developed.   HENT:      Head: Normocephalic and atraumatic.      Mouth/Throat:      Pharynx: No oropharyngeal exudate.   Eyes:      Conjunctiva/sclera: Conjunctivae normal.      Pupils: Pupils are equal, round, and reactive to light.   Cardiovascular:      Rate and Rhythm: Normal rate and regular rhythm.      Heart sounds: No murmur heard.    No friction rub. No gallop.   Pulmonary:      Effort: Pulmonary effort is normal.      Breath sounds: Normal breath sounds. No wheezing or rhonchi.   Skin:     General: Skin is warm and dry. "   Neurological:      Mental Status: She is alert and oriented to person, place, and time.   Psychiatric:         Mood and Affect: Affect normal.          Result Review :   The following data was reviewed by: Elicia Betts MD on 05/01/2023:  CMP        8/25/2022    09:56 2/1/2023    10:32 5/1/2023    14:59   CMP   Glucose 94   115   79     BUN 11   11   13     Creatinine 0.83   0.87   0.76     EGFR 75.0   70.5   82.9     Sodium 141   145   144     Potassium 3.9   3.7   3.9     Chloride 105   106   103     Calcium 9.2   9.6   10.1     Total Protein 7.0   7.4   7.7     Albumin 4.40   4.5   4.7     Globulin 2.6   2.9   3.0     Total Bilirubin 0.4   0.3   0.4     Alkaline Phosphatase 70   74   74     AST (SGOT) 29   25   32     ALT (SGPT) 23   23   27     Albumin/Globulin Ratio 1.7   1.6   1.6     BUN/Creatinine Ratio 13.3   12.6   17.1     Anion Gap 12.3   11.0   16.4       CBC        8/25/2022    09:56 2/1/2023    10:32 5/1/2023    14:59   CBC   WBC 4.80      5.03   4.40   5.37     RBC 4.37      4.41   4.42   4.42     Hemoglobin 12.8      12.9   12.7   13.0     Hematocrit 39.0      39.5   38.4   38.6     MCV 89.2      89.6   86.9   87.3     MCH 29.3      29.3   28.7   29.4     MCHC 32.8      32.7   33.1   33.7     RDW 13.1      13.2   12.7   12.6     Platelets 243      245   230   241       Lipid Panel        8/25/2022    09:56 2/1/2023    10:32 5/1/2023    14:59   Lipid Panel   Total Cholesterol 198   205   186     Triglycerides 71   95   161     HDL Cholesterol 91   95   87     VLDL Cholesterol 13   16   26     LDL Cholesterol  94   94   73     LDL/HDL Ratio 1.02   0.96   0.77       TSH        2/1/2023    10:32 5/1/2023    14:59   TSH   TSH 1.610   1.640       Most Recent A1C        5/1/2023    14:59   HGBA1C Most Recent   Hemoglobin A1C 6.00           Procedures    Assessment and Plan   Diagnoses and all orders for this visit:    1. Impaired fasting glucose (Primary)  -     CBC & Differential  -      Comprehensive Metabolic Panel  -     Hemoglobin A1c  -     Lipid Panel  -     TSH  -     MicroAlbumin, Urine, Random - Urine, Clean Catch  -     Manual Differential    2. Dysuria  -     Urinalysis With Culture If Indicated -; Future  -     Urinalysis With Culture If Indicated - Urine, Clean Catch  -     Urine Culture - Urine, Urine, Clean Catch    3. Essential hypertension  Assessment & Plan:  Well controlled in clinic today  Continue current management    Orders:  -     Lipid Panel  -     TSH    4. Systemic lupus erythematosus, unspecified SLE type, unspecified organ involvement status  Assessment & Plan:  Followed by rheumatology.   Currently on Plaquenil      5. Fibromyalgia  Assessment & Plan:  Will start Cymbalta for fibromyalgia pain      Other orders  -     DULoxetine (CYMBALTA) 30 MG capsule; Take 1 capsule by mouth 2 (Two) Times a Day. Take 1 tab per day for 1 week, then increase to twice a day.  Dispense: 60 capsule; Refill: 0          Follow Up   Return in about 6 weeks (around 6/12/2023) for Next scheduled follow up.  Patient was given instructions and counseling regarding her condition or for health maintenance advice. Please see specific information pulled into the AVS if appropriate.

## 2023-05-04 ENCOUNTER — TELEPHONE (OUTPATIENT)
Dept: INTERNAL MEDICINE | Facility: CLINIC | Age: 73
End: 2023-05-04
Payer: MEDICARE

## 2023-05-04 NOTE — TELEPHONE ENCOUNTER
----- Message from Elicia Betts MD sent at 5/3/2023  4:29 PM EDT -----  A1C stable at 6%  Cholesterol well controlled  Kidney, Liver, and electrolyte levels normal  Thyroid function normal  CBC normal  Urinalysis and culture normal

## 2023-05-04 NOTE — TELEPHONE ENCOUNTER
I called and gave the patient her lab results. Patient understood and had no other questions or concerns at this time.

## 2023-07-12 PROBLEM — R42 DIZZINESS: Status: ACTIVE | Noted: 2023-07-12

## 2023-08-30 RX ORDER — METFORMIN HYDROCHLORIDE 500 MG/1
500 TABLET, EXTENDED RELEASE ORAL 2 TIMES DAILY
Qty: 30 TABLET | Refills: 1 | Status: SHIPPED | OUTPATIENT
Start: 2023-08-30

## 2023-08-30 NOTE — TELEPHONE ENCOUNTER
Caller: Yuliana Da Silva    Relationship: Self    Best call back number: [phone]     Requested Prescriptions:   Requested Prescriptions     Pending Prescriptions Disp Refills    metFORMIN ER (GLUCOPHAGE-XR) 500 MG 24 hr tablet       Sig: Take 1 tablet by mouth 2 (Two) Times a Day.        Pharmacy where request should be sent: [place] [phone] [place] [phone]      Last office visit with prescribing clinician: 7/12/2023   Last telemedicine visit with prescribing clinician: Visit date not found   Next office visit with prescribing clinician: Visit date not found     Additional details provided by patient:     Does the patient have less than a 3 day supply:  [] Yes  [x] No    Would you like a call back once the refill request has been completed: [] Yes [] No    If the office needs to give you a call back, can they leave a voicemail: [] Yes [] No    Asmita Ortiz, PCT   08/30/23 10:22 EDT

## 2023-09-19 RX ORDER — CETIRIZINE HYDROCHLORIDE 10 MG/1
10 TABLET ORAL DAILY
Qty: 90 TABLET | Refills: 1 | Status: SHIPPED | OUTPATIENT
Start: 2023-09-19

## 2023-09-19 NOTE — TELEPHONE ENCOUNTER
Caller: Yuliana Da Silva    Relationship: Self    Best call back number: [phone] (Mobile)     Requested Prescriptions:   Requested Prescriptions     Pending Prescriptions Disp Refills    cetirizine (zyrTEC) 10 MG tablet 90 tablet 1     Sig: Take 1 tablet by mouth Daily.        Pharmacy where request should be sent:  [place] [phone] [phone] [place], [place] [phone]     Last office visit with prescribing clinician: 7/12/2023   Last telemedicine visit with prescribing clinician: Visit date not found   Next office visit with prescribing clinician: Visit date not found     Additional details provided by patient: PATIENT HAS A 4 DAY SUPPLY    Does the patient have less than a 3 day supply:  [] Yes  [x] No    Would you like a call back once the refill request has been completed: [] Yes [] No    If the office needs to give you a call back, can they leave a voicemail: [x] Yes [] No    Delores Sanchez Rep   09/19/23 10:36 EDT

## 2023-09-21 ENCOUNTER — NURSE TRIAGE (OUTPATIENT)
Dept: CALL CENTER | Facility: HOSPITAL | Age: 73
End: 2023-09-21
Payer: MEDICARE

## 2023-09-21 NOTE — TELEPHONE ENCOUNTER
Exposure To Known Illness 09/21/2023 Tested positive on Monday went to  and was Tessalon Perles and Tylenol. Wanted her to take Paxlovid but her pharmacy advised against it due to her Lupus and Placquenil. States she is having shortness of breath and cough and her ribs are hurting from the cough.    Reviewed guideline with caller, advises she be seen within 4 hours. Attempted warm transfer, no answer. Caller advised to call office and leave message on nurse line. Also advised if office does not call her, to return to  where she was diagnosed and report her pain with cough and continued congestion in her head and chest.   Caller agrees to follow care advice.

## 2023-09-21 NOTE — TELEPHONE ENCOUNTER
"Reason for Disposition   MILD difficulty breathing (e.g., minimal/no SOB at rest, SOB with walking, pulse <100)    Additional Information   Negative: SEVERE difficulty breathing (e.g., struggling for each breath, speaks in single words)   Negative: Difficult to awaken or acting confused (e.g., disoriented, slurred speech)   Negative: Bluish (or gray) lips or face now   Negative: Shock suspected (e.g., cold/pale/clammy skin, too weak to stand, low BP, rapid pulse)   Negative: Sounds like a life-threatening emergency to the triager   Negative: [1] Diagnosed or suspected COVID-19 AND [2] symptoms lasting 3 or more weeks   Negative: [1] COVID-19 exposure AND [2] no symptoms   Negative: COVID-19 vaccine reaction suspected (e.g., fever, headache, muscle aches) occurring 1 to 3 days after getting vaccine   Negative: COVID-19 vaccine, questions about   Negative: [1] Lives with someone known to have influenza (flu test positive) AND [2] flu-like symptoms (e.g., cough, runny nose, sore throat, SOB; with or without fever)   Negative: [1] Possible COVID-19 symptoms AND [2] triager concerned about severity of symptoms or other causes   Negative: COVID-19 and breastfeeding, questions about   Negative: SEVERE or constant chest pain or pressure  (Exception: Mild central chest pain, present only when coughing.)   Negative: MODERATE difficulty breathing (e.g., speaks in phrases, SOB even at rest, pulse 100-120)   Negative: [1] Headache AND [2] stiff neck (can't touch chin to chest)   Negative: Oxygen level (e.g., pulse oximetry) 90 percent or lower   Negative: Chest pain or pressure  (Exception: MILD central chest pain, present only when coughing.)   Negative: [1] Drinking very little AND [2] dehydration suspected (e.g., no urine > 12 hours, very dry mouth, very lightheaded)   Negative: Patient sounds very sick or weak to the triager    Answer Assessment - Initial Assessment Questions  1. COVID-19 DIAGNOSIS: \"How do you know that you " "have COVID?\" (e.g., positive lab test or self-test, diagnosed by doctor or NP/PA, symptoms after exposure).      Tested positive on Tuesday at  antigen test  2. COVID-19 EXPOSURE: \"Was there any known exposure to COVID before the symptoms began?\" [facility] Definition of close contact: within 6 feet (2 meters) for a total of 15 minutes or more over a 24-hour period.        tested positive as well   3. ONSET: \"When did the COVID-19 symptoms start?\"       Sunday afternoon  4. WORST SYMPTOM: \"What is your worst symptom?\" (e.g., cough, fever, shortness of breath, muscle aches)      Shaky and congested in head and chest and coughing  5. COUGH: \"Do you have a cough?\" If Yes, ask: \"How bad is the cough?\"        Yes, moderate  6. FEVER: \"Do you have a fever?\" If Yes, ask: \"What is your temperature, how was it measured, and when did it start?\"      no  7. RESPIRATORY STATUS: \"Describe your breathing?\" (e.g., normal; shortness of breath, wheezing, unable to speak)       Shortness of breath, wheezing  8. BETTER-SAME-WORSE: \"Are you getting better, staying the same or getting worse compared to yesterday?\"  If getting worse, ask, \"In what way?\"      Better today except cough and congestion  9. OTHER SYMPTOMS: \"Do you have any other symptoms?\"  (e.g., chills, fatigue, headache, loss of smell or taste, muscle pain, sore throat)      Fatigue, headache, muscle soreness  10. HIGH RISK DISEASE: \"Do you have any chronic medical problems?\" (e.g., asthma, heart or lung disease, weak immune system, obesity, etc.)        Yes, Lupus  11. VACCINE: \"Have you had the COVID-19 vaccine?\" If Yes, ask: \"Which one, how many shots, when did you get it?\"        First vaccine  12. PREGNANCY: \"Is there any chance you are pregnant?\" \"When was your last menstrual period?\"        no  13. O2 SATURATION MONITOR:  \"Do you use an oxygen saturation monitor (pulse oximeter) at home?\" If Yes, ask \"What is your reading (oxygen level) today?\" \"What is your usual " "oxygen saturation reading?\" (e.g., 95%)        no    Protocols used: Coronavirus (COVID-19) Diagnosed or Suspected-ADULT-AH    "

## 2023-10-09 ENCOUNTER — OFFICE VISIT (OUTPATIENT)
Dept: CARDIOLOGY | Facility: CLINIC | Age: 73
End: 2023-10-09
Payer: MEDICARE

## 2023-10-09 VITALS
HEIGHT: 65 IN | BODY MASS INDEX: 31.49 KG/M2 | WEIGHT: 189 LBS | DIASTOLIC BLOOD PRESSURE: 45 MMHG | SYSTOLIC BLOOD PRESSURE: 119 MMHG | RESPIRATION RATE: 19 BRPM | HEART RATE: 83 BPM

## 2023-10-09 DIAGNOSIS — I10 ESSENTIAL HYPERTENSION: Primary | ICD-10-CM

## 2023-10-09 DIAGNOSIS — R00.2 PALPITATIONS: ICD-10-CM

## 2023-10-09 PROBLEM — E66.09 CLASS 1 OBESITY DUE TO EXCESS CALORIES WITH SERIOUS COMORBIDITY AND BODY MASS INDEX (BMI) OF 32.0 TO 32.9 IN ADULT: Status: RESOLVED | Noted: 2023-02-01 | Resolved: 2023-10-09

## 2023-10-09 PROBLEM — R06.02 SHORTNESS OF BREATH: Status: RESOLVED | Noted: 2022-08-24 | Resolved: 2023-10-09

## 2023-10-09 PROBLEM — Z12.11 COLON CANCER SCREENING: Status: RESOLVED | Noted: 2022-04-21 | Resolved: 2023-10-09

## 2023-10-09 PROBLEM — R51.9 HEADACHE: Status: RESOLVED | Noted: 2022-08-24 | Resolved: 2023-10-09

## 2023-10-09 PROBLEM — R42 DIZZINESS: Status: RESOLVED | Noted: 2023-07-12 | Resolved: 2023-10-09

## 2023-10-09 PROBLEM — J01.10 ACUTE NON-RECURRENT FRONTAL SINUSITIS: Status: RESOLVED | Noted: 2022-05-02 | Resolved: 2023-10-09

## 2023-10-09 PROBLEM — E66.811 CLASS 1 OBESITY DUE TO EXCESS CALORIES WITH SERIOUS COMORBIDITY AND BODY MASS INDEX (BMI) OF 32.0 TO 32.9 IN ADULT: Status: RESOLVED | Noted: 2023-02-01 | Resolved: 2023-10-09

## 2023-10-09 PROBLEM — K85.90 PANCREATITIS: Status: RESOLVED | Noted: 2022-08-24 | Resolved: 2023-10-09

## 2023-10-09 PROBLEM — R05.9 COUGH: Status: RESOLVED | Noted: 2022-01-11 | Resolved: 2023-10-09

## 2023-10-09 PROCEDURE — 1159F MED LIST DOCD IN RCRD: CPT | Performed by: NURSE PRACTITIONER

## 2023-10-09 PROCEDURE — 1160F RVW MEDS BY RX/DR IN RCRD: CPT | Performed by: NURSE PRACTITIONER

## 2023-10-09 PROCEDURE — 3074F SYST BP LT 130 MM HG: CPT | Performed by: NURSE PRACTITIONER

## 2023-10-09 PROCEDURE — 3078F DIAST BP <80 MM HG: CPT | Performed by: NURSE PRACTITIONER

## 2023-10-09 PROCEDURE — 99213 OFFICE O/P EST LOW 20 MIN: CPT | Performed by: NURSE PRACTITIONER

## 2023-10-09 NOTE — PROGRESS NOTES
Chief Complaint  Palpitations and Follow-up    Subjective            History of Present Illness  Yuliana Da Silva is a 73-year-old female patient who presents to the office today for follow-up.  She takes metoprolol for palpitations.  She takes amlodipine and hydrochlorothiazide for hypertension.  She is currently recovering from recent COVID infection last month.  She did have some shortness of breath and palpitations while she was experiencing COVID symptoms.  Today she denies any chest pain, shortness of breath, lightheadedness/dizziness, palpitations, or edema.    PMH  Past Medical History:   Diagnosis Date    Allergic rhinitis 06/04/2013    Anemia     Asthma     W/ ACUTE EXACERBATION    Atypical chest pain     Cataract     Chronic bronchitis     Cough variant asthma 06/09/2014    Dry eye     Fibromyalgia     Gastroesophageal reflux disease     Headache     Hiatal hernia     HTN (hypertension)     Impaired fasting glucose     Insomnia     Leukocytopenia     Lupus     Palpitations     Pancreatitis     W/ STENT PLACEMENT    Screening for breast cancer 11/2018    WNL    SOB (shortness of breath)          ALLERGY  Allergies   Allergen Reactions    Codeine Hives    Levofloxacin Hives, Diarrhea and Unknown - High Severity    Sulfamethoxazole-Trimethoprim Diarrhea and Unknown - High Severity    Tetracycline Hives and Unknown - High Severity    Tetracyclines & Related GI Intolerance    Sulfa Antibiotics GI Intolerance          SURGICALHX  Past Surgical History:   Procedure Laterality Date    COLONOSCOPY  2008    COLONOSCOPY N/A 3/1/2023    Procedure: COLONOSCOPY WITH POLYPECTOMY;  Surgeon: Risa Ocampo MD;  Location: [facility];  Service: Gastroenterology;  Laterality: N/A;  COLON POLYP    ENDOSCOPY  2004    HYSTERECTOMY  1998    FOR FIBROIDS. BOTH ovaries removed           SOC  Social History     Socioeconomic History    Marital status:    Tobacco Use    Smoking status: Never    Smokeless  tobacco: Never    Tobacco comments:     Grew up exposed to second hand smoke   Vaping Use    Vaping Use: Never used   Substance and Sexual Activity    Alcohol use: Never    Drug use: Never    Sexual activity: Defer         FAMHX  Family History   Problem Relation Age of Onset    Hypertension Mother     Diabetes Father     Lung cancer Brother     [place] Hyperthermia Neg Hx     Colon cancer Neg Hx           MEDSIGONLY  Current Outpatient Medications on File Prior to Visit   Medication Sig    acetaminophen (TYLENOL) 500 MG tablet Take 1 tablet by mouth Every 6 (Six) Hours As Needed for Mild Pain.    amLODIPine (NORVASC) 5 MG tablet Take 1 tablet by mouth Daily. (Patient taking differently: Take 2 tablets by mouth Daily.)    benzonatate (TESSALON) 100 MG capsule Take 1 capsule by mouth 3 (Three) Times a Day As Needed for Cough.    budesonide-formoterol (Symbicort) 160-4.5 MCG/ACT inhaler Inhale 2 puffs As Needed (Cough).    cetirizine (zyrTEC) 10 MG tablet Take 1 tablet by mouth Daily.    Cholecalciferol (Vitamin D) 50 MCG (2000 UT) tablet Take 1 tablet by mouth Daily.    Flovent  MCG/ACT inhaler Inhale 2 puffs 2 (Two) Times a Day.    fluticasone (FLONASE) 50 MCG/ACT nasal spray 2 sprays into the nostril(s) as directed by provider 2 (Two) Times a Day.    guaifenesin-dextromethorphan (MUCINEX DM)  MG tablet sustained-release 12 hour tablet Take 1 tablet by mouth 2 (Two) Times a Day As Needed (cough).    hydroCHLOROthiazide (HYDRODIURIL) 25 MG tablet Take 1 tablet by mouth Daily.    hydroxychloroquine (PLAQUENIL) 200 MG tablet Take 1 tablet by mouth Daily.    Lubricating Plus Eye Drops 0.5 % solution Administer 1 drop to both eyes Daily.    metFORMIN ER (GLUCOPHAGE-XR) 500 MG 24 hr tablet Take 1 tablet by mouth 2 (Two) Times a Day.    metoprolol succinate XL (TOPROL-XL) 25 MG 24 hr tablet Take 1 tablet by mouth Daily.    mometasone (NASONEX) 50 MCG/ACT nasal spray 2 sprays into the nostril(s) as directed by  "provider As Needed.    montelukast (Singulair) 10 MG tablet Take 1 tablet by mouth Daily.    Nirmatrelvir&Ritonavir 300/100 (PAXLOVID) 20 x 150 MG & 10 x 100MG tablet therapy pack tablet Take 3 tablets by mouth 2 (Two) Times a Day.    Omega 3-6-9 Fatty Acids (OMEGA 3-6-9 PO) Take 1 capsule by mouth Daily.    omeprazole (priLOSEC) 20 MG capsule Take 1 capsule by mouth 2 (Two) Times a Day. (Patient taking differently: Take 1 capsule by mouth As Needed.)    pravastatin (PRAVACHOL) 20 MG tablet     ProAir  (90 Base) MCG/ACT inhaler Inhale 2 puffs As Needed.    Xiidra 5 % ophthalmic solution Administer 1 drop to both eyes 2 (Two) Times a Day.     No current facility-administered medications on file prior to visit.         Objective   /45 (BP Location: Left arm, Patient Position: Sitting)   Pulse 83   Resp 19   Ht 165.1 cm (65\")   Wt 85.7 kg (189 lb)   BMI 31.45 kg/mý       Physical Exam  HENT:      Head: Normocephalic.   Neck:      Vascular: No carotid bruit.   Cardiovascular:      Rate and Rhythm: Normal rate and regular rhythm.      Pulses: Normal pulses.      Heart sounds: Normal heart sounds. No murmur heard.  Pulmonary:      Effort: Pulmonary effort is normal.      Breath sounds: Normal breath sounds.   Musculoskeletal:      Cervical back: Neck supple.      Right lower leg: No edema.      Left lower leg: No edema.   Skin:     General: Skin is dry.   Neurological:      Mental Status: She is alert and oriented to person, place, and time.   Psychiatric:         Behavior: Behavior normal.       Result Review :   The following data was reviewed by: ABI Alvarado on 10/09/2023:  No results found for: \"PROBNP\"  CMP          5/1/2023    14:59   CMP   Glucose 79    BUN 13    Creatinine 0.76    EGFR 82.9    Sodium 144    Potassium 3.9    Chloride 103    Calcium 10.1    Total Protein 7.7    Albumin 4.7    Globulin 3.0    Total Bilirubin 0.4    Alkaline Phosphatase 74    AST (SGOT) 32    ALT (SGPT) 27  " "  Albumin/Globulin Ratio 1.6    BUN/Creatinine Ratio 17.1    Anion Gap 16.4      CBC w/diff          5/1/2023    14:59   CBC w/Diff   WBC 5.37    RBC 4.42    Hemoglobin 13.0    Hematocrit 38.6    MCV 87.3    MCH 29.4    MCHC 33.7    RDW 12.6    Platelets 241    Neutrophil Rel %    Immature Granulocyte Rel %    Lymphocyte Rel %    Monocyte Rel %    Eosinophil Rel %    Basophil Rel %       Lab Results   Component Value Date    TSH 1.640 05/01/2023      Lab Results   Component Value Date    FREET4 1.2 04/26/2019      No results found for: \"DDIMERQUANT\"  Magnesium   Date Value Ref Range Status   03/09/2021 1.94 1.60 - 2.30 mg/dL Final      No results found for: \"DIGOXIN\"   No results found for: \"TROPONINT\"        Lipid Panel          5/1/2023    14:59   Lipid Panel   Total Cholesterol 186    Triglycerides 161    HDL Cholesterol 87    VLDL Cholesterol 26    LDL Cholesterol  73    LDL/HDL Ratio 0.77             Assessment and Plan    Diagnoses and all orders for this visit:    1. Essential hypertension (Primary)  Currently controlled without adverse effects from medication, continue amlodipine 10 mg daily and hydrochlorothiazide 25 mg daily.    2. Palpitations  Symptomatically stable at this time, continue metoprolol 25 mg daily.  Avoid caffeine products.          Follow Up   Return in about 6 months (around 4/9/2024) for Follow up with Dr Lin.    Patient was given instructions and counseling regarding her condition or for health maintenance advice. Please see specific information pulled into the AVS if appropriate.     Yuliana ARNOLD Da Silva  reports that she has never smoked. She has never used smokeless tobacco.           Haven Garcia, APRN  10/09/23  14:15 EDT    Dictated Utilizing Dragon Dictation    "

## 2023-10-30 ENCOUNTER — TRANSCRIBE ORDERS (OUTPATIENT)
Dept: ADMINISTRATIVE | Facility: HOSPITAL | Age: 73
End: 2023-10-30
Payer: MEDICARE

## 2023-10-30 ENCOUNTER — LAB (OUTPATIENT)
Dept: LAB | Facility: HOSPITAL | Age: 73
End: 2023-10-30
Payer: MEDICARE

## 2023-10-30 DIAGNOSIS — E78.5 HYPERLIPIDEMIA, UNSPECIFIED HYPERLIPIDEMIA TYPE: ICD-10-CM

## 2023-10-30 DIAGNOSIS — E11.9 DIABETES MELLITUS WITHOUT COMPLICATION: ICD-10-CM

## 2023-10-30 DIAGNOSIS — I10 HYPERTENSION, ESSENTIAL: ICD-10-CM

## 2023-10-30 DIAGNOSIS — E78.5 HYPERLIPIDEMIA, UNSPECIFIED HYPERLIPIDEMIA TYPE: Primary | ICD-10-CM

## 2023-10-30 LAB
ALBUMIN SERPL-MCNC: 4.7 G/DL (ref 3.5–5.2)
ALBUMIN/GLOB SERPL: 1.8 G/DL
ALP SERPL-CCNC: 64 U/L (ref 39–117)
ALT SERPL W P-5'-P-CCNC: 23 U/L (ref 1–33)
ANION GAP SERPL CALCULATED.3IONS-SCNC: 10.4 MMOL/L (ref 5–15)
AST SERPL-CCNC: 31 U/L (ref 1–32)
BILIRUB SERPL-MCNC: 0.3 MG/DL (ref 0–1.2)
BUN SERPL-MCNC: 10 MG/DL (ref 8–23)
BUN/CREAT SERPL: 11.6 (ref 7–25)
CALCIUM SPEC-SCNC: 9.3 MG/DL (ref 8.6–10.5)
CHLORIDE SERPL-SCNC: 105 MMOL/L (ref 98–107)
CHOLEST SERPL-MCNC: 147 MG/DL (ref 0–200)
CO2 SERPL-SCNC: 26.6 MMOL/L (ref 22–29)
CREAT SERPL-MCNC: 0.86 MG/DL (ref 0.57–1)
EGFRCR SERPLBLD CKD-EPI 2021: 71 ML/MIN/1.73
GLOBULIN UR ELPH-MCNC: 2.6 GM/DL
GLUCOSE SERPL-MCNC: 102 MG/DL (ref 65–99)
HBA1C MFR BLD: 6.1 % (ref 4.8–5.6)
HDLC SERPL-MCNC: 85 MG/DL (ref 40–60)
LDLC SERPL CALC-MCNC: 49 MG/DL (ref 0–100)
LDLC/HDLC SERPL: 0.58 {RATIO}
POTASSIUM SERPL-SCNC: 4.3 MMOL/L (ref 3.5–5.2)
PROT SERPL-MCNC: 7.3 G/DL (ref 6–8.5)
SODIUM SERPL-SCNC: 142 MMOL/L (ref 136–145)
TRIGL SERPL-MCNC: 62 MG/DL (ref 0–150)
VLDLC SERPL-MCNC: 13 MG/DL (ref 5–40)

## 2023-10-30 PROCEDURE — 80061 LIPID PANEL: CPT

## 2023-10-30 PROCEDURE — 36415 COLL VENOUS BLD VENIPUNCTURE: CPT

## 2023-10-30 PROCEDURE — 80053 COMPREHEN METABOLIC PANEL: CPT

## 2023-10-30 PROCEDURE — 83036 HEMOGLOBIN GLYCOSYLATED A1C: CPT

## 2023-11-08 RX ORDER — METFORMIN HYDROCHLORIDE 500 MG/1
500 TABLET, EXTENDED RELEASE ORAL 2 TIMES DAILY
Qty: 30 TABLET | Refills: 1 | Status: SHIPPED | OUTPATIENT
Start: 2023-11-08

## 2023-11-08 NOTE — TELEPHONE ENCOUNTER
Caller: Yuliana Da Silva    Relationship: Self    Best call back number: [phone]     Requested Prescriptions:   Requested Prescriptions     Pending Prescriptions Disp Refills    metFORMIN ER (GLUCOPHAGE-XR) 500 MG 24 hr tablet 30 tablet 1     Sig: Take 1 tablet by mouth 2 (Two) Times a Day.        Pharmacy where request should be sent: [place] [phone] [place] [phone]      Last office visit with prescribing clinician: 7/12/2023   Last telemedicine visit with prescribing clinician: Visit date not found   Next office visit with prescribing clinician: 11/16/2023     Additional details provided by patient: WHEN PATIENT'S  PICKED UP MEDICATION FROM PHARMACY, PRESCRIPTION WAS WRITTEN INCORRECTLY. PATIENT STATES THAT SHE RECEIVED A 60 DAY SUPPLY INSTEAD OF HER USUAL 90 DAY SUPPLY. PATIENT IS ABOUT TO LEAVE FOR [place] AND NEEDS A NEW PRESCRIPTION PUT ON FILE FOR HER TO GET THE PROPER AMOUNT OF MEDICATION. PLEASE ADVISE.     PATIENT WOULD LIKE A PHONE CALL WHEN MEDICATION IS CALLED INTO PHARMACY SO THAT SHE KNOWS WHEN TO CALL AND ACTIVATE THE REFILL.     Does the patient have less than a 3 day supply:  [] Yes  [x] No    Would you like a call back once the refill request has been completed: [x] Yes [] No    If the office needs to give you a call back, can they leave a voicemail: [x] Yes [] No    Delores Jacob Rep   11/08/23 10:29 EST

## 2023-11-16 ENCOUNTER — OFFICE VISIT (OUTPATIENT)
Dept: INTERNAL MEDICINE | Facility: CLINIC | Age: 73
End: 2023-11-16
Payer: MEDICARE

## 2023-11-16 VITALS
TEMPERATURE: 98.3 F | WEIGHT: 184.6 LBS | DIASTOLIC BLOOD PRESSURE: 70 MMHG | RESPIRATION RATE: 18 BRPM | SYSTOLIC BLOOD PRESSURE: 122 MMHG | OXYGEN SATURATION: 96 % | BODY MASS INDEX: 30.75 KG/M2 | HEIGHT: 65 IN | HEART RATE: 80 BPM

## 2023-11-16 DIAGNOSIS — M79.7 FIBROMYALGIA: ICD-10-CM

## 2023-11-16 DIAGNOSIS — G89.4 CHRONIC PAIN SYNDROME: ICD-10-CM

## 2023-11-16 DIAGNOSIS — Z00.00 ENCOUNTER FOR SUBSEQUENT ANNUAL WELLNESS VISIT (AWV) IN MEDICARE PATIENT: Primary | ICD-10-CM

## 2023-11-16 DIAGNOSIS — Z78.0 POSTMENOPAUSE: ICD-10-CM

## 2023-11-16 PROCEDURE — 3074F SYST BP LT 130 MM HG: CPT | Performed by: INTERNAL MEDICINE

## 2023-11-16 PROCEDURE — G0439 PPPS, SUBSEQ VISIT: HCPCS | Performed by: INTERNAL MEDICINE

## 2023-11-16 PROCEDURE — 3078F DIAST BP <80 MM HG: CPT | Performed by: INTERNAL MEDICINE

## 2023-11-16 PROCEDURE — 3044F HG A1C LEVEL LT 7.0%: CPT | Performed by: INTERNAL MEDICINE

## 2023-11-16 PROCEDURE — 1170F FXNL STATUS ASSESSED: CPT | Performed by: INTERNAL MEDICINE

## 2024-01-17 ENCOUNTER — TELEPHONE (OUTPATIENT)
Dept: INTERNAL MEDICINE | Facility: CLINIC | Age: 74
End: 2024-01-17

## 2024-01-17 DIAGNOSIS — Z12.31 VISIT FOR SCREENING MAMMOGRAM: Primary | ICD-10-CM

## 2024-01-17 RX ORDER — METFORMIN HYDROCHLORIDE 500 MG/1
500 TABLET, EXTENDED RELEASE ORAL 2 TIMES DAILY
Qty: 30 TABLET | Refills: 1 | Status: SHIPPED | OUTPATIENT
Start: 2024-01-17 | End: 2024-01-17 | Stop reason: SDUPTHER

## 2024-01-17 RX ORDER — METFORMIN HYDROCHLORIDE 500 MG/1
500 TABLET, EXTENDED RELEASE ORAL 2 TIMES DAILY
Qty: 180 TABLET | Refills: 1 | Status: SHIPPED | OUTPATIENT
Start: 2024-01-17

## 2024-01-17 NOTE — TELEPHONE ENCOUNTER
Caller: Yuliana Da Silva    Relationship: Self    Best call back number: [phone]     Requested Prescriptions: PATIENT NEEDS 90 DAY SUPPLY  Requested Prescriptions     Pending Prescriptions Disp Refills    metFORMIN ER (GLUCOPHAGE-XR) 500 MG 24 hr tablet 30 tablet 1     Sig: Take 1 tablet by mouth 2 (Two) Times a Day.        Pharmacy where request should be sent: [place] [phone] [place] [phone]      Last office visit with prescribing clinician: 11/16/2023   Last telemedicine visit with prescribing clinician: Visit date not found   Next office visit with prescribing clinician: Visit date not found     Additional details provided by patient: PATIENT NEEDS 90 DAY SUPPLY CALLED IN IF POSSIBLE. PLEASE ADVISE. PATIENT HAS A WEEK LEFT OF MEDICATION.    Does the patient have less than a 3 day supply:  [] Yes  [x] No    Would you like a call back once the refill request has been completed: [] Yes [] No    If the office needs to give you a call back, can they leave a voicemail: [] Yes [] No    Delores Jacob Rep   01/17/24 11:01 EST

## 2024-02-13 ENCOUNTER — TRANSCRIBE ORDERS (OUTPATIENT)
Dept: ADMINISTRATIVE | Facility: HOSPITAL | Age: 74
End: 2024-02-13
Payer: MEDICARE

## 2024-02-13 ENCOUNTER — HOSPITAL ENCOUNTER (OUTPATIENT)
Dept: BONE DENSITY | Facility: HOSPITAL | Age: 74
Discharge: HOME | End: 2024-02-13
Admitting: INTERNAL MEDICINE
Payer: MEDICARE

## 2024-02-13 DIAGNOSIS — Z78.0 POSTMENOPAUSE: ICD-10-CM

## 2024-02-13 DIAGNOSIS — J45.909 ASTHMA ACTION PLAN DECLINED: ICD-10-CM

## 2024-02-13 DIAGNOSIS — M32.9 LUPUS: ICD-10-CM

## 2024-02-13 DIAGNOSIS — R05.9 COUGH, UNSPECIFIED TYPE: Primary | ICD-10-CM

## 2024-02-13 PROCEDURE — 77080 DXA BONE DENSITY AXIAL: CPT

## 2024-02-21 ENCOUNTER — TELEPHONE (OUTPATIENT)
Dept: CARDIOLOGY | Facility: CLINIC | Age: 74
End: 2024-02-21
Payer: MEDICARE

## 2024-02-21 DIAGNOSIS — I10 ESSENTIAL HYPERTENSION: ICD-10-CM

## 2024-02-21 RX ORDER — AMLODIPINE BESYLATE 5 MG/1
5 TABLET ORAL DAILY
Qty: 90 TABLET | Refills: 1 | Status: SHIPPED | OUTPATIENT
Start: 2024-02-21

## 2024-02-21 NOTE — TELEPHONE ENCOUNTER
PT CALLED REQUESTING AMLODIPINE 5 MG DAILY 90 DAY REFILL BE SENT TO [facility].  STATED SHE TAKES ONE 5 MG TABLET DAILY.  PLEASE VERIFY DOSE AND DIRECTIONS.

## 2024-02-28 RX ORDER — HYDROCHLOROTHIAZIDE 25 MG/1
25 TABLET ORAL DAILY
Qty: 90 TABLET | Refills: 3 | Status: SHIPPED | OUTPATIENT
Start: 2024-02-28

## 2024-02-28 NOTE — TELEPHONE ENCOUNTER
PT CALLED REQUESTING REFILL ON HCTZ TO BE SENT TO [facility].  REFILL SENT AS REQUESTED.

## 2024-03-05 ENCOUNTER — HOSPITAL ENCOUNTER (OUTPATIENT)
Dept: CT IMAGING | Facility: HOSPITAL | Age: 74
Discharge: HOME | End: 2024-03-05
Admitting: INTERNAL MEDICINE
Payer: MEDICARE

## 2024-03-05 DIAGNOSIS — R05.9 COUGH, UNSPECIFIED TYPE: ICD-10-CM

## 2024-03-05 DIAGNOSIS — J45.909 ASTHMA ACTION PLAN DECLINED: ICD-10-CM

## 2024-03-05 DIAGNOSIS — M32.9 LUPUS: ICD-10-CM

## 2024-03-05 PROCEDURE — 71250 CT THORAX DX C-: CPT

## 2024-03-08 ENCOUNTER — TELEPHONE (OUTPATIENT)
Dept: URGENT CARE | Facility: CLINIC | Age: 74
End: 2024-03-08
Payer: MEDICARE

## 2024-03-08 DIAGNOSIS — H01.004 BLEPHARITIS OF LEFT UPPER EYELID, UNSPECIFIED TYPE: Primary | ICD-10-CM

## 2024-03-08 RX ORDER — ERYTHROMYCIN 5 MG/G
OINTMENT OPHTHALMIC 3 TIMES DAILY
Qty: 3.5 G | Refills: 0 | Status: SHIPPED | OUTPATIENT
Start: 2024-03-08 | End: 2024-03-13

## 2024-03-08 RX ORDER — ERYTHROMYCIN 5 MG/G
OINTMENT OPHTHALMIC 3 TIMES DAILY
Qty: 3.5 G | Refills: 0 | Status: SHIPPED | OUTPATIENT
Start: 2024-03-08 | End: 2024-03-08

## 2024-03-08 NOTE — TELEPHONE ENCOUNTER
Patient was contacted for status check.  Patient is feeling better, but still has irritation of the left eye.  Originally a prescription for erythromycin eye ointment was supposed to been ordered by me.  I gone ahead and ordered that at her pharmacy.  Erythromycin 0.5% eye ointment.  Dispense 3.5 g.  Apply to left eye 3 times a day for next 5 days.

## 2024-04-03 ENCOUNTER — HOSPITAL ENCOUNTER (OUTPATIENT)
Dept: MAMMOGRAPHY | Facility: HOSPITAL | Age: 74
Discharge: HOME | End: 2024-04-03
Admitting: INTERNAL MEDICINE
Payer: MEDICARE

## 2024-04-03 DIAGNOSIS — Z12.31 VISIT FOR SCREENING MAMMOGRAM: ICD-10-CM

## 2024-04-03 PROCEDURE — 77067 SCR MAMMO BI INCL CAD: CPT

## 2024-04-03 PROCEDURE — 77063 BREAST TOMOSYNTHESIS BI: CPT

## 2024-05-07 ENCOUNTER — OFFICE VISIT (OUTPATIENT)
Dept: CARDIOLOGY | Facility: CLINIC | Age: 74
End: 2024-05-07
Payer: MEDICARE

## 2024-05-07 VITALS
WEIGHT: 187 LBS | HEART RATE: 74 BPM | BODY MASS INDEX: 31.16 KG/M2 | DIASTOLIC BLOOD PRESSURE: 52 MMHG | HEIGHT: 65 IN | SYSTOLIC BLOOD PRESSURE: 134 MMHG

## 2024-05-07 DIAGNOSIS — R00.2 PALPITATIONS: Primary | ICD-10-CM

## 2024-05-07 DIAGNOSIS — I10 HYPERTENSION, ESSENTIAL: ICD-10-CM

## 2024-05-07 DIAGNOSIS — I10 ESSENTIAL HYPERTENSION: ICD-10-CM

## 2024-05-07 PROCEDURE — 1160F RVW MEDS BY RX/DR IN RCRD: CPT | Performed by: SPECIALIST

## 2024-05-07 PROCEDURE — 1159F MED LIST DOCD IN RCRD: CPT | Performed by: SPECIALIST

## 2024-05-07 PROCEDURE — 3078F DIAST BP <80 MM HG: CPT | Performed by: SPECIALIST

## 2024-05-07 PROCEDURE — 3075F SYST BP GE 130 - 139MM HG: CPT | Performed by: SPECIALIST

## 2024-05-07 PROCEDURE — 99214 OFFICE O/P EST MOD 30 MIN: CPT | Performed by: SPECIALIST

## 2024-05-07 RX ORDER — AMLODIPINE BESYLATE 5 MG/1
5 TABLET ORAL DAILY
Qty: 90 TABLET | Refills: 1 | Status: SHIPPED | OUTPATIENT
Start: 2024-05-07

## 2024-05-30 ENCOUNTER — HOSPITAL ENCOUNTER (EMERGENCY)
Facility: HOSPITAL | Age: 74
Discharge: LEFT BEFORE DISCHARGE | End: 2024-05-30
Payer: MEDICARE

## 2024-05-30 VITALS
DIASTOLIC BLOOD PRESSURE: 50 MMHG | TEMPERATURE: 99.4 F | BODY MASS INDEX: 31.11 KG/M2 | WEIGHT: 186.73 LBS | HEART RATE: 81 BPM | RESPIRATION RATE: 18 BRPM | OXYGEN SATURATION: 100 % | HEIGHT: 65 IN | SYSTOLIC BLOOD PRESSURE: 139 MMHG

## 2024-05-30 PROCEDURE — 99211 OFF/OP EST MAY X REQ PHY/QHP: CPT

## 2024-05-31 ENCOUNTER — TELEPHONE (OUTPATIENT)
Dept: INTERNAL MEDICINE | Facility: CLINIC | Age: 74
End: 2024-05-31
Payer: MEDICARE

## 2024-05-31 PROCEDURE — 82948 REAGENT STRIP/BLOOD GLUCOSE: CPT

## 2024-05-31 NOTE — TELEPHONE ENCOUNTER
Caller: Yuliana Da Silva    Relationship to patient: Self    Best call back number: [phone]     Patient is needing: PATIENT HAS A COUGH, LEG AND BACK ACHE, IS UNABLE TO SLEEP, AND HAS NERVOUSNESS. PATIENT IS REQUESTING SAME DAY. PATIENT  WENT TO ER AND IT WAS VERY BUS DUE TO A TRAUMA. WHILE PATIENT WAS AT THE ER, THEY TOOK WEIGHT, BLOOD PRESSURE. PATIENT STATES THAT ER TOLD HER TO FOLLOW UP WITH HER PRIMARY CARE PROVIDER ASAP.

## 2024-06-03 ENCOUNTER — OFFICE VISIT (OUTPATIENT)
Dept: INTERNAL MEDICINE | Facility: CLINIC | Age: 74
End: 2024-06-03
Payer: MEDICARE

## 2024-06-03 VITALS
WEIGHT: 186.13 LBS | DIASTOLIC BLOOD PRESSURE: 68 MMHG | TEMPERATURE: 97.8 F | BODY MASS INDEX: 31.01 KG/M2 | HEART RATE: 79 BPM | HEIGHT: 65 IN | SYSTOLIC BLOOD PRESSURE: 126 MMHG | RESPIRATION RATE: 16 BRPM | OXYGEN SATURATION: 99 %

## 2024-06-03 DIAGNOSIS — M79.10 MYALGIA: Primary | ICD-10-CM

## 2024-06-03 LAB
ALBUMIN SERPL-MCNC: 4.6 G/DL (ref 3.5–5.2)
ALBUMIN/GLOB SERPL: 1.4 G/DL
ALP SERPL-CCNC: 57 U/L (ref 39–117)
ALT SERPL W P-5'-P-CCNC: 23 U/L (ref 1–33)
ANION GAP SERPL CALCULATED.3IONS-SCNC: 12.3 MMOL/L (ref 5–15)
AST SERPL-CCNC: 26 U/L (ref 1–32)
BASOPHILS # BLD AUTO: 0.04 10*3/MM3 (ref 0–0.2)
BASOPHILS NFR BLD AUTO: 0.7 % (ref 0–1.5)
BILIRUB SERPL-MCNC: 0.4 MG/DL (ref 0–1.2)
BUN SERPL-MCNC: 10 MG/DL (ref 8–23)
BUN/CREAT SERPL: 12.7 (ref 7–25)
CALCIUM SPEC-SCNC: 9.5 MG/DL (ref 8.6–10.5)
CHLORIDE SERPL-SCNC: 103 MMOL/L (ref 98–107)
CO2 SERPL-SCNC: 25.7 MMOL/L (ref 22–29)
CREAT SERPL-MCNC: 0.79 MG/DL (ref 0.57–1)
CRP SERPL-MCNC: <0.3 MG/DL (ref 0–0.5)
DEPRECATED RDW RBC AUTO: 39 FL (ref 37–54)
EGFRCR SERPLBLD CKD-EPI 2021: 78.6 ML/MIN/1.73
EOSINOPHIL # BLD AUTO: 0.02 10*3/MM3 (ref 0–0.4)
EOSINOPHIL NFR BLD AUTO: 0.4 % (ref 0.3–6.2)
ERYTHROCYTE [DISTWIDTH] IN BLOOD BY AUTOMATED COUNT: 12.3 % (ref 12.3–15.4)
ERYTHROCYTE [SEDIMENTATION RATE] IN BLOOD: 17 MM/HR (ref 0–30)
GLOBULIN UR ELPH-MCNC: 3.2 GM/DL
GLUCOSE SERPL-MCNC: 110 MG/DL (ref 65–99)
HCT VFR BLD AUTO: 37.4 % (ref 34–46.6)
HGB BLD-MCNC: 12.5 G/DL (ref 12–15.9)
IMM GRANULOCYTES # BLD AUTO: 0.02 10*3/MM3 (ref 0–0.05)
IMM GRANULOCYTES NFR BLD AUTO: 0.4 % (ref 0–0.5)
LYMPHOCYTES # BLD AUTO: 1.68 10*3/MM3 (ref 0.7–3.1)
LYMPHOCYTES NFR BLD AUTO: 29.5 % (ref 19.6–45.3)
MCH RBC QN AUTO: 29.1 PG (ref 26.6–33)
MCHC RBC AUTO-ENTMCNC: 33.4 G/DL (ref 31.5–35.7)
MCV RBC AUTO: 87.2 FL (ref 79–97)
MONOCYTES # BLD AUTO: 0.29 10*3/MM3 (ref 0.1–0.9)
MONOCYTES NFR BLD AUTO: 5.1 % (ref 5–12)
NEUTROPHILS NFR BLD AUTO: 3.65 10*3/MM3 (ref 1.7–7)
NEUTROPHILS NFR BLD AUTO: 63.9 % (ref 42.7–76)
NRBC BLD AUTO-RTO: 0 /100 WBC (ref 0–0.2)
PLATELET # BLD AUTO: 244 10*3/MM3 (ref 140–450)
PMV BLD AUTO: 10.5 FL (ref 6–12)
POTASSIUM SERPL-SCNC: 3.8 MMOL/L (ref 3.5–5.2)
PROT SERPL-MCNC: 7.8 G/DL (ref 6–8.5)
RBC # BLD AUTO: 4.29 10*6/MM3 (ref 3.77–5.28)
SODIUM SERPL-SCNC: 141 MMOL/L (ref 136–145)
WBC NRBC COR # BLD AUTO: 5.7 10*3/MM3 (ref 3.4–10.8)

## 2024-06-03 PROCEDURE — 1126F AMNT PAIN NOTED NONE PRSNT: CPT | Performed by: INTERNAL MEDICINE

## 2024-06-03 PROCEDURE — 3074F SYST BP LT 130 MM HG: CPT | Performed by: INTERNAL MEDICINE

## 2024-06-03 PROCEDURE — 3078F DIAST BP <80 MM HG: CPT | Performed by: INTERNAL MEDICINE

## 2024-06-03 PROCEDURE — 1160F RVW MEDS BY RX/DR IN RCRD: CPT | Performed by: INTERNAL MEDICINE

## 2024-06-03 PROCEDURE — 80053 COMPREHEN METABOLIC PANEL: CPT | Performed by: INTERNAL MEDICINE

## 2024-06-03 PROCEDURE — 99213 OFFICE O/P EST LOW 20 MIN: CPT | Performed by: INTERNAL MEDICINE

## 2024-06-03 PROCEDURE — 85652 RBC SED RATE AUTOMATED: CPT | Performed by: INTERNAL MEDICINE

## 2024-06-03 PROCEDURE — 86140 C-REACTIVE PROTEIN: CPT | Performed by: INTERNAL MEDICINE

## 2024-06-03 PROCEDURE — 36415 COLL VENOUS BLD VENIPUNCTURE: CPT | Performed by: INTERNAL MEDICINE

## 2024-06-03 PROCEDURE — 85025 COMPLETE CBC W/AUTO DIFF WBC: CPT | Performed by: INTERNAL MEDICINE

## 2024-06-03 PROCEDURE — 1159F MED LIST DOCD IN RCRD: CPT | Performed by: INTERNAL MEDICINE

## 2024-06-03 NOTE — PROGRESS NOTES
"Chief Complaint  Cough (Started about 1 week ago ), Back Pain (Was seen at urgent care on 05/31/024), and Leg Pain (Was seen at urgent care on 05/31/024)    Subjective      Yuliana Da Silva is a 73 y.o. female who presents to [facility]     Presenting for follow up of back and leg pain. Was seen in  on 5/31 and was started on prednisone. She feels her lupus is flaring up and causing her pain. Would like blood work today.     Objective   Vital Signs:   Vitals:    06/03/24 1139   BP: 126/68   BP Location: Left arm   Patient Position: Sitting   Cuff Size: Adult   Pulse: 79   Resp: 16   Temp: 97.8 °F (36.6 °C)   TempSrc: Temporal   SpO2: 99%   Weight: 84.4 kg (186 lb 2 oz)   Height: 165.1 cm (65\")     Body mass index is 30.97 kg/m².    Wt Readings from Last 3 Encounters:   06/03/24 84.4 kg (186 lb 2 oz)   05/31/24 84.8 kg (186 lb 14.4 oz)   05/07/24 84.8 kg (187 lb)     BP Readings from Last 3 Encounters:   06/03/24 126/68   05/31/24 131/70   05/07/24 134/52       Health Maintenance   Topic Date Due    TDAP/TD VACCINES (1 - Tdap) Never done    DIABETIC FOOT EXAM  Never done    COVID-19 Vaccine (2 - 2023-24 season) 09/01/2023    URINE MICROALBUMIN  05/01/2024    BMI FOLLOWUP  05/01/2024    HEMOGLOBIN A1C  06/19/2024    Pneumococcal Vaccine 65+ (3 of 3 - PPSV23 or PCV20) 06/03/2024 (Originally 10/3/2020)    INFLUENZA VACCINE  08/01/2024    ANNUAL WELLNESS VISIT  11/16/2024    LIPID PANEL  12/19/2024    DIABETIC EYE EXAM  01/19/2025    DXA SCAN  02/13/2026    COLORECTAL CANCER SCREENING  03/01/2026    MAMMOGRAM  04/03/2026    HEPATITIS C SCREENING  Completed    RSV Vaccine - Adults  Completed    ZOSTER VACCINE  Completed       Physical Exam  Vitals reviewed.   Constitutional:       Appearance: Normal appearance. She is well-developed.   HENT:      Head: Normocephalic and atraumatic.      Mouth/Throat:      Pharynx: No oropharyngeal exudate.   Eyes:      " Conjunctiva/sclera: Conjunctivae normal.      Pupils: Pupils are equal, round, and reactive to light.   Neck:      Thyroid: No thyromegaly or thyroid tenderness.   Cardiovascular:      Rate and Rhythm: Normal rate and regular rhythm.      Heart sounds: No murmur heard.     No friction rub. No gallop.   Pulmonary:      Effort: Pulmonary effort is normal.      Breath sounds: Normal breath sounds. No wheezing or rhonchi.   Lymphadenopathy:      Cervical: No cervical adenopathy.   Skin:     General: Skin is warm and dry.   Neurological:      Mental Status: She is alert and oriented to person, place, and time.   Psychiatric:         Mood and Affect: Affect normal.          Result Review :  The following data was reviewed by: Elicia Betts MD on 06/03/2024:  CMP          10/30/2023    11:04   CMP   Glucose 102    BUN 10    Creatinine 0.86    EGFR 71.0    Sodium 142    Potassium 4.3    Chloride 105    Calcium 9.3    Total Protein 7.3    Albumin 4.7    Globulin 2.6    Total Bilirubin 0.3    Alkaline Phosphatase 64    AST (SGOT) 31    ALT (SGPT) 23    Albumin/Globulin Ratio 1.8    BUN/Creatinine Ratio 11.6    Anion Gap 10.4        Lipid Panel          10/30/2023    11:04   Lipid Panel   Total Cholesterol 147    Triglycerides 62    HDL Cholesterol 85    VLDL Cholesterol 13    LDL Cholesterol  49    LDL/HDL Ratio 0.58             Procedures          Assessment & Plan  Myalgia  Checking labs today  Continue steroid course as prescribed.   Orders Placed This Encounter   Procedures    Sedimentation Rate    C-reactive Protein    Comprehensive Metabolic Panel    CBC Auto Differential    CBC & Differential                      FOLLOW UP  Return for Keep previously scheduled follow up appointment.  Patient was given instructions and counseling regarding her condition or for health maintenance advice. Please see specific information pulled into the AVS if appropriate.       Elicia Betts MD  06/03/24  13:34  EDT    CURRENT & DISCONTINUED MEDICATIONS  Current Outpatient Medications   Medication Instructions    acetaminophen (TYLENOL) 1,000 mg, Oral, 3 Times Daily PRN    amLODIPine (NORVASC) 5 mg, Oral, Daily    budesonide-formoterol (Symbicort) 160-4.5 MCG/ACT inhaler 2 puffs, Inhalation, As Needed    cetirizine (ZYRTEC) 10 mg, Oral, Daily    Flovent  MCG/ACT inhaler 2 puffs, Inhalation, 2 Times Daily - RT    fluticasone (FLONASE) 50 MCG/ACT nasal spray 2 sprays, Nasal, 2 Times Daily    hydroCHLOROthiazide 25 mg, Oral, Daily    hydroxychloroquine (PLAQUENIL) 200 MG tablet 1 tablet, Oral, Every 24 Hours    Lubricating Plus Eye Drops 0.5 % solution 1 drop, Both Eyes, Daily    metFORMIN ER (GLUCOPHAGE-XR) 500 mg, Oral, 2 Times Daily    metoprolol succinate XL (TOPROL-XL) 25 mg, Oral, Daily    mometasone (NASONEX) 50 MCG/ACT nasal spray 2 sprays, Nasal, As Needed    montelukast (SINGULAIR) 10 mg, Oral, Daily    montelukast (SINGULAIR) 10 mg, Oral, Nightly PRN    Omega 3-6-9 Fatty Acids (OMEGA 3-6-9 PO) 1 capsule, Oral, Daily    omeprazole (PRILOSEC) 20 mg, Oral, 2 Times Daily    pravastatin (PRAVACHOL) 20 MG tablet     predniSONE (DELTASONE) 5 mg, Oral, Daily    ProAir  (90 Base) MCG/ACT inhaler 2 puffs, Inhalation, As Needed    Spiriva Respimat 1.25 MCG/ACT aerosol solution inhaler     Vitamin D 2,000 Units, Oral, Daily    Xiidra 5 % ophthalmic solution 1 drop, Both Eyes, 2 Times Daily       There are no discontinued medications.

## 2024-06-18 ENCOUNTER — TELEPHONE (OUTPATIENT)
Dept: INTERNAL MEDICINE | Facility: CLINIC | Age: 74
End: 2024-06-18
Payer: MEDICARE

## 2024-06-18 NOTE — TELEPHONE ENCOUNTER
Caller: Yuliana Da Silva    Relationship: Self    Best call back number: [phone]     What medication are you requesting: YEAST INFECTION TREATMENT     What are your current symptoms: CURRENTLY ON ANTIBIOTICS FOR EAR PAIN    Have you had these symptoms before:    [x] Yes  [] No    Have you been treated for these symptoms before:   [x] Yes  [] No    If a prescription is needed, what is your preferred pharmacy and phone number: [facility] - [address] [phone] [facility] [phone] FX     Additional notes:  REQUESTING TWO DOSES DUE TO THE STRENGTH OF HER ANTIBIOTICS.   PLEASE CALL WHEN SENT IN

## 2024-06-19 RX ORDER — FLUCONAZOLE 150 MG/1
150 TABLET ORAL
Qty: 2 TABLET | Refills: 0 | Status: SHIPPED | OUTPATIENT
Start: 2024-06-19 | End: 2024-06-23

## 2024-07-29 ENCOUNTER — OFFICE VISIT (OUTPATIENT)
Dept: OTOLARYNGOLOGY | Facility: CLINIC | Age: 74
End: 2024-07-29
Payer: MEDICARE

## 2024-07-29 ENCOUNTER — PROCEDURE VISIT (OUTPATIENT)
Dept: OTOLARYNGOLOGY | Facility: CLINIC | Age: 74
End: 2024-07-29
Payer: MEDICARE

## 2024-07-29 VITALS — WEIGHT: 184.8 LBS | BODY MASS INDEX: 30.79 KG/M2 | TEMPERATURE: 97 F | HEIGHT: 65 IN

## 2024-07-29 DIAGNOSIS — H90.12 CONDUCTIVE HEARING LOSS, UNILATERAL, LEFT EAR, WITH UNRESTRICTED HEARING ON THE CONTRALATERAL SIDE: ICD-10-CM

## 2024-07-29 DIAGNOSIS — H93.8X2 EAR FULLNESS, LEFT: Primary | ICD-10-CM

## 2024-07-29 DIAGNOSIS — M26.629 TMJ SYNDROME: ICD-10-CM

## 2024-07-29 PROCEDURE — 92557 COMPREHENSIVE HEARING TEST: CPT | Performed by: AUDIOLOGIST

## 2024-07-29 PROCEDURE — 99214 OFFICE O/P EST MOD 30 MIN: CPT | Performed by: OTOLARYNGOLOGY

## 2024-07-29 PROCEDURE — 92567 TYMPANOMETRY: CPT | Performed by: AUDIOLOGIST

## 2024-07-29 RX ORDER — PREDNISOLONE ACETATE 10 MG/ML
SUSPENSION/ DROPS OPHTHALMIC
COMMUNITY
Start: 2024-07-01

## 2024-07-29 NOTE — PATIENT INSTRUCTIONS
1.  At 1 time patient had resolution of the left ear fullness when she was last seen by Dr. Guerrero.  However now patient redeveloped the left ear fullness.  Audiogram was obtained today showing SRT right ear with 15 and 10 on the left ear.  Discrimination scores 100% bilaterally.  Tympanograms are also type a bilaterally as well.  Pure-tone show right ear mild conductive hearing loss at higher frequency but left ear is completely normal.  2.  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  Patient apparently has asymmetrical opening of the mouth and has definite TMJ asymmetry as well.  She does not have any tenderness or pain at this time.  But I think fullness is certainly a cause from this.  She already knows that she has bruxism as well and I recommend getting a dental guard made.  But also to avoid chewing on 1 side and try to avoid chewing gum, taffy, jerky, etc.  Patient is to try to catch herself from clenching as well.  3.  I will see patient as needed.

## 2024-07-29 NOTE — PROGRESS NOTES
AUDIOMETRIC EVALUATION      Name:  Yuliana Da Silva  :  1950  Age:  73 y.o.  Date of Evaluation:  2024       History:  Mrs. Da Silva is seen today for a hearing evaluation due to aural fullness each ear.    Audiologic Information:  Concerns for Hearing: No  PETs: None  Other otologic surgical history: No  Aural Pressure/Fullness: Yes each ear  Otalgia: No  Otorrhea: No  Tinnitus: No  Dizziness: No  Noise Exposure: No  Family history of hearing loss: None  Head trauma requiring hospital stay: No  Chemotherapy: No  Other significant history: None    EVALUATION:    See audiogram    RESULTS:    Otoscopic Evaluation:        NOTE: Testing completed after ears were examined by Dr. Guerrero.    Tympanometry (226 Hz):  Right: Type A with a mild negative pressure  Left: Type A    IMPRESSIONS:  Pure tone thresholds for the right ear shows a mild conductive hearing loss at 4K hertz.  A mild loss 8K hertz.  Pure tone thresholds for the left ear shows hearing within normal limits.  Patient was counseled with regard to the findings.    RECOMMENDATIONS/PLAN:  Follow-up recommendations of Dr. Guerrero.  Discussed results and recommendations with patient. Questions were addressed and the patient was encouraged to contact our department should concerns arise.          Mert Ferris M.S, [facility]A  Licensed Audiologist

## 2024-07-29 NOTE — PROGRESS NOTES
Patient Name: Yuliana Da Silva   Visit Date: 07/29/2024   Patient ID: [id number]  Provider: Tony Guerrero MD    Sex: female  Location: [facility]   YOB: 1950  Location Address: [address] [place] [address], [address] [place] [address],?[id number]    Primary Care Provider Elicia Betts MD  Location Phone: [phone]    Referring Provider: No ref. provider found        Chief Complaint  6 MONTH FOLLOW UP EARS    History of Present Illness  Yuliana Da Silva is a 73 y.o. female who presents to [facility] for 6 MONTH FOLLOW UP EARS.  Patient was last seen by Dr. Guerrero at [facility] clinic on 8/23/2023.  Patient has history of allergies and Moreman on beta-blocker but also has been seen in the past for left ear fullness and workup in 2019 was negative.  Patient does have history of low-frequency sensorineural hearing loss.  On patient's last visit, left ear fullness had resolved.  Patient's audiogram obtained on 11/23/2020 showed bilateral normal hearing.  Patient reports that her fullness in her left ear is back and sometimes she hears herself echoing in the left ear.    Past Medical History:   Diagnosis Date    Allergic rhinitis 06/04/2013    Anemia     Asthma     W/ ACUTE EXACERBATION    Atypical chest pain     Cataract     Chronic bronchitis     Cough variant asthma 06/09/2014    Dry eye     Fibromyalgia     Gastroesophageal reflux disease     Headache     Hiatal hernia     HTN (hypertension)     Impaired fasting glucose     Insomnia     Leukocytopenia     Lupus     Palpitations     Pancreatitis     W/ STENT PLACEMENT    Screening for breast cancer 11/2018    WNL    SOB (shortness of breath)        Past Surgical History:   Procedure Laterality Date    COLONOSCOPY  2008    COLONOSCOPY N/A 3/1/2023    Procedure: COLONOSCOPY WITH POLYPECTOMY;  Surgeon: Risa Ocampo MD;  Location: [facility] ENDOSCOPY;  Service: Gastroenterology;   Laterality: N/A;  COLON POLYP    ENDOSCOPY  2004    HYSTERECTOMY  1998    FOR FIBROIDS. BOTH ovaries removed          Current Outpatient Medications:     acetaminophen (TYLENOL) 500 MG tablet, Take 2 tablets by mouth 3 (Three) Times a Day As Needed for Headache., Disp: 50 tablet, Rfl: 0    amLODIPine (NORVASC) 5 MG tablet, Take 1 tablet by mouth Daily., Disp: 90 tablet, Rfl: 1    budesonide-formoterol (Symbicort) 160-4.5 MCG/ACT inhaler, Inhale 2 puffs As Needed (Cough)., Disp: 1 each, Rfl: 12    cetirizine (zyrTEC) 10 MG tablet, Take 1 tablet by mouth Daily., Disp: 90 tablet, Rfl: 1    Cholecalciferol (Vitamin D) 50 MCG (2000 UT) tablet, Take 1 tablet by mouth Daily., Disp: , Rfl:     fexofenadine (ALLEGRA) 60 MG tablet, , Disp: , Rfl:     Flovent  MCG/ACT inhaler, Inhale 2 puffs 2 (Two) Times a Day., Disp: , Rfl:     fluticasone (FLONASE) 50 MCG/ACT nasal spray, 2 sprays into the nostril(s) as directed by provider 2 (Two) Times a Day., Disp: , Rfl:     hydroCHLOROthiazide 25 MG tablet, Take 1 tablet by mouth Daily., Disp: 90 tablet, Rfl: 3    hydroxychloroquine (PLAQUENIL) 200 MG tablet, Take 1 tablet by mouth Daily., Disp: , Rfl:     Lubricating Plus Eye Drops 0.5 % solution, Administer 1 drop to both eyes Daily., Disp: , Rfl:     metFORMIN ER (GLUCOPHAGE-XR) 500 MG 24 hr tablet, Take 1 tablet by mouth 2 (Two) Times a Day., Disp: 180 tablet, Rfl: 1    metoprolol succinate XL (TOPROL-XL) 25 MG 24 hr tablet, Take 1 tablet by mouth Daily., Disp: , Rfl:     mometasone (NASONEX) 50 MCG/ACT nasal spray, 2 sprays into the nostril(s) as directed by provider As Needed., Disp: , Rfl:     montelukast (SINGULAIR) 10 MG tablet, Take 1 tablet by mouth At Night As Needed (allergies)., Disp: 30 tablet, Rfl: 0    Omega 3-6-9 Fatty Acids (OMEGA 3-6-9 PO), Take 1 capsule by mouth Daily., Disp: , Rfl:     pravastatin (PRAVACHOL) 20 MG tablet, , Disp: , Rfl:     prednisoLONE acetate (PRED FORTE) 1 % ophthalmic suspension, ,  "Disp: , Rfl:     ProAir  (90 Base) MCG/ACT inhaler, Inhale 2 puffs As Needed., Disp: , Rfl:     Spiriva Respimat 1.25 MCG/ACT aerosol solution inhaler, , Disp: , Rfl:     Trelegy Ellipta 200-62.5-25 MCG/ACT inhaler, , Disp: , Rfl:     Xiidra 5 % ophthalmic solution, Administer 1 drop to both eyes 2 (Two) Times a Day., Disp: , Rfl:     omeprazole (priLOSEC) 20 MG capsule, Take 1 capsule by mouth 2 (Two) Times a Day. (Patient not taking: Reported on 7/29/2024), Disp: 180 capsule, Rfl: 1     Allergies   Allergen Reactions    Codeine Hives    Levofloxacin Hives, Diarrhea and Unknown - High Severity    Sulfamethoxazole-Trimethoprim Diarrhea and Unknown - High Severity    Tetracycline Hives and Unknown - High Severity    Tetracyclines & Related GI Intolerance    Sulfa Antibiotics GI Intolerance       Social History     Tobacco Use    Smoking status: Never    Smokeless tobacco: Never    Tobacco comments:     Grew up exposed to second hand smoke   Vaping Use    Vaping status: Never Used   Substance Use Topics    Alcohol use: Never    Drug use: Never        Objective     Vital Signs:   Vitals:    07/29/24 1018   Temp: 97 °F (36.1 °C)   Weight: 83.8 kg (184 lb 12.8 oz)   Height: 165.1 cm (65\")       Tobacco Use: Low Risk  (7/29/2024)    Patient History     Smoking Tobacco Use: Never     Smokeless Tobacco Use: Never     Passive Exposure: Not on file         Physical Exam    Constitutional   Appearance  well developed, well-nourished, alert and in no acute distress, voice clear and strong    Head   Inspection  no deformities or lesions      Face   Inspection  no facial lesions; House-Brackmann I/VI bilaterally   Palpation  no TMJ crepitus nor  muscle tenderness bilaterally but definitely there is asymmetry in opening and closing.    Eyes/Vision   Visual Fields  extraocular movements are intact, no spontaneous or gaze-induced nystagmus  Conjunctivae  clear   Sclerae  clear   Pupils and Irises  pupils equal, " round, and reactive to light.   Nystagmus  not present     Ears, Nose, Mouth and Throat  Ears  External Ears  appearance within normal limits, no lesions present   Otoscopic Examination  tympanic membrane appearance within normal limits bilaterally without perforations, well-aerated middle ears   Hearing  intact to conversational voice both ears   Tunning fork testing    Rinne:  Chew:    Nose  External Nose  appearance normal   Intranasal Exam  mucosa within normal limits, vestibules normal, no intranasal lesions present, septum midline, sinuses non tender to percussion   Modified Cherokee Test:    Oral Cavity  Oral Mucosa  oral mucosa normal without pallor or cyanosis   Lips  lip appearance normal   Teeth  normal dentition for age   Gums  gums pink, non-swollen, no bleeding present   Tongue  tongue appearance normal; normal mobility   Palate  hard palate normal, soft palate appearance normal with symmetric mobility     Throat  Oropharynx  no inflammation or lesions present, tonsils within normal limits   Hypopharynx  appearance within normal limits   Larynx  voice normal     Neck  Inspection/Palpation  normal appearance, no masses or tenderness, trachea midline; thyroid size normal, nontender, no nodules or masses present on palpation     Lymphatic  Neck  no lymphadenopathy present   Supraclavicular Nodes  no lymphadenopathy present   Preauricular Nodes  no lymphadenopathy present     Respiratory  Respiratory Effort  breathing unlabored   Inspection of Chest  normal appearance, no retractions     Musculoskeletal   Cervical back: Normal range of motion and neck supple.      Skin and Subcutaneous Tissue  General Inspection  Regarding face and neck - there are no rashes present, no lesions present, and no areas of discoloration     Neurologic  Cranial Nerves  cranial nerves II-XII are grossly intact bilaterally   Gait and Station  normal gait, able to stand without diffculty    Psychiatric  Judgement and  "Insight  judgment and insight intact   Mood and Affect  mood normal, affect appropriate       RESULTS REVIEWED    I have reviewed the following information:   [x]  Previous Internal Note  []  Previous External Note:   [x]  Ordered Tests & Results:      Pathology:   Lab Results   Component Value Date    CASEREPORT  03/01/2023     Surgical Pathology Report                         Case: [id number]                                  Authorizing Provider:  Risa Ocampo MD Collected:           03/01/2023 08:14 AM          Ordering Location:     [facility] Received:            03/01/2023 09:36 AM                                 SUITES                                                                       Pathologist:           Celestina Estes MD                                                     Specimen:    Large Intestine, Sigmoid Colon, SIGMOID COLON POLYP BX                                     CLININFO  03/01/2023     Colon cancer screening      FINALDX  03/01/2023     Sigmoid colon polyp, biopsy:    - Fragments of tubular adenoma        GROSSDES  03/01/2023     1. Large Intestine, Sigmoid Colon.  Received in formalin and labeled \" sigmoid colon polyp\" are two fragments of tan soft tissue measuring 0.2-0.3 cm in greatest dimension. The specimen is entirely submitted in one cassette. YOLY        MICRO  03/01/2023      Comment:      Microscopic examination performed.         TSH   Date Value Ref Range Status   05/01/2023 1.640 0.270 - 4.200 uIU/mL Final     Free T4   Date Value Ref Range Status   04/26/2019 1.2 0.9 - 1.8 ng/dL Final     Calcium   Date Value Ref Range Status   06/03/2024 9.5 8.6 - 10.5 mg/dL Final     25 Hydroxy, Vitamin D   Date Value Ref Range Status   03/19/2021 59.1 30.0 - 100.0 ng/mL Final     Comment:     Vitamin D Status          Range  ---------------------------------------  Deficiency                <10 ng/mL  Insufficiency             10-30 ng/mL  Sufficiency       "          ng/mL  Toxicity                  >100 ng/mL         XR Chest 2 View    Result Date: 6/17/2024  Impression: No acute process. Electronically Signed: Aleah Peacock MD  6/17/2024 12:15 PM EDT  Workstation ID: [id number]    Mammo Screening Digital Tomosynthesis Bilateral With CAD    Result Date: 4/3/2024  No mammographic signs of malignancy. Recommend annual screening mammography  TISSUE DENSITY: There are scattered areas of fibroglandular density.  BI-RADS ASSESSMENT: BI-RADS 1. Negative.   Note: It has been reported that there is approximately a 15% false negative in mammography. Therefore, management of a palpable abnormality should not be deferred because of a negative mammogram.           Electronically Signed By-KIN JORGENSEN MD On:4/3/2024 4:06 PM        I have discussed the interpretation of the above results with the patient.    Procedures          Assessment and Plan   Diagnoses and all orders for this visit:    1. Ear fullness, left (Primary)  -     Comprehensive Hearing Test; Future    2. Conductive hearing loss, unilateral, left ear, with unrestricted hearing on the contralateral side  -     Comprehensive Hearing Test; Future    3. TMJ syndrome        (H93.8X2) Ear fullness, left - Plan: Comprehensive Hearing Test    (H90.12) Conductive hearing loss, unilateral, left ear, with unrestricted hearing on the contralateral side - Plan: Comprehensive Hearing Test    (M26.629) TMJ syndrome     Yuliana Da Silva  reports that she has never smoked. She has never used smokeless tobacco.       Plan:  Patient Instructions   1.  At 1 time patient had resolution of the left ear fullness when she was last seen by Dr. Guerrero.  However now patient redeveloped the left ear fullness.  Audiogram was obtained today showing SRT right ear with 15 and 10 on the left ear.  Discrimination scores 100% bilaterally.  Tympanograms are also type a bilaterally as well.  Pure-tone show right ear mild conductive hearing  loss at higher frequency but left ear is completely normal.  2.  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  Patient apparently has asymmetrical opening of the mouth and has definite TMJ asymmetry as well.  She does not have any tenderness or pain at this time.  But I think fullness is certainly a cause from this.  She already knows that she has bruxism as well and I recommend getting a dental guard made.  But also to avoid chewing on 1 side and try to avoid chewing gum, taffy, jerky, etc.  Patient is to try to catch herself from clenching as well.  3.  I will see patient as needed.      Follow Up   Return if symptoms worsen or fail to improve.  Patient was given instructions and counseling regarding her condition or for health maintenance advice. Please see specific information pulled into the AVS if appropriate.

## 2024-10-24 ENCOUNTER — OFFICE VISIT (OUTPATIENT)
Dept: INTERNAL MEDICINE | Facility: CLINIC | Age: 74
End: 2024-10-24
Payer: MEDICARE

## 2024-10-24 VITALS
DIASTOLIC BLOOD PRESSURE: 74 MMHG | SYSTOLIC BLOOD PRESSURE: 122 MMHG | HEIGHT: 65 IN | WEIGHT: 185.4 LBS | TEMPERATURE: 96.8 F | OXYGEN SATURATION: 95 % | HEART RATE: 78 BPM | BODY MASS INDEX: 30.89 KG/M2

## 2024-10-24 DIAGNOSIS — R42 DIZZINESS: ICD-10-CM

## 2024-10-24 DIAGNOSIS — R73.03 PREDIABETES: Primary | ICD-10-CM

## 2024-10-24 DIAGNOSIS — T50.905A MEDICATION SIDE EFFECT, INITIAL ENCOUNTER: ICD-10-CM

## 2024-10-24 DIAGNOSIS — E86.0 DEHYDRATION: ICD-10-CM

## 2024-10-24 DIAGNOSIS — R19.7 DIARRHEA, UNSPECIFIED TYPE: ICD-10-CM

## 2024-10-24 PROCEDURE — G2211 COMPLEX E/M VISIT ADD ON: HCPCS | Performed by: NURSE PRACTITIONER

## 2024-10-24 PROCEDURE — 3078F DIAST BP <80 MM HG: CPT | Performed by: NURSE PRACTITIONER

## 2024-10-24 PROCEDURE — 99214 OFFICE O/P EST MOD 30 MIN: CPT | Performed by: NURSE PRACTITIONER

## 2024-10-24 PROCEDURE — 1126F AMNT PAIN NOTED NONE PRSNT: CPT | Performed by: NURSE PRACTITIONER

## 2024-10-24 PROCEDURE — 3074F SYST BP LT 130 MM HG: CPT | Performed by: NURSE PRACTITIONER

## 2024-10-24 NOTE — PROGRESS NOTES
Chief Complaint  Dehydration (Feels dizzy and faint sometimes ) and Diarrhea (Believes it is from the METFORMIN )    Subjective        Yuliana Da Silva presents to [facility] for concerns regarding diarrhea, dizziness, questionable diabetes.    Patient comes in with concerns, primarily concerned regarding whether or not she has type 2 diabetes.  Patient reports that one of her specialty providers advised her that she was a diabetic.  On review, it does look like patient has had impaired fasting glucose/prediabetes for the last several years.  She has never had an A1c of 6.5 or greater.  Her highest A1c was 6.3 a couple of years ago.  She was maintaining well without any medication, simply lifestyle was maintaining.  However, patient was getting concerned regarding her weight last year.  She reached out to her PCP regarding weight loss options, and ultimately the decision to start low-dose metformin was made.  She would not be a good candidate for GLP-1's due to her history of pancreatitis.  She did start metformin, initially worked her way up to taking 500 mg twice daily.  She was having significant side effects based on her recollection with GI, causing frequent diarrhea.  Under consultation of her provider, she did decrease this dose to 500 mg once daily.  Despite this change, she continues to have regular diarrhea.  She does try to stay well-hydrated, she tries to drink Powerade 0 when she is feeling this way.  Dizziness is a common thing for her, and she feels like it is just simply from the frequency of her bowel movements and hydration status.  She has been to [facility] regarding her eyes, she did have some blurred vision as well, they have reported that her eyes are good, they have not had any concerns.  There have been no other significant changes to her medication regimens, she does see cardiology, hematology/oncology.    Objective   Vital Signs:   /74   Pulse  "78   Temp 96.8 °F (36 °C)   Ht 165.1 cm (65\")   Wt 84.1 kg (185 lb 6.4 oz)   SpO2 95%   BMI 30.85 kg/m²     Physical Exam  Vitals and nursing note reviewed.   Constitutional:       Appearance: Normal appearance.   HENT:      Head: Normocephalic and atraumatic.   Cardiovascular:      Rate and Rhythm: Normal rate.   Pulmonary:      Effort: Pulmonary effort is normal.   Neurological:      Mental Status: She is alert.   Psychiatric:         Mood and Affect: Mood normal.         Thought Content: Thought content normal.        Result Review :  {The following data was reviewed by ABI Elizondo on 10/24/24                Diagnoses and all orders for this visit:    1. Prediabetes (Primary)    2. Diarrhea, unspecified type    3. Medication side effect, initial encounter    4. Dizziness    5. Dehydration    After thorough review of her medical records, A1c is consistent with prediabetes or impaired fasting glucose, we did review this in detail today.  I did also discuss with her ideal treatment regimens for this condition, which would include lifestyle modifications and metformin.  Also reviewed the GLP-1 class, which I do agree that this would not be a good choice for her due to her history of pancreatitis.  Unfortunately, the metformin has not been working well with her, she has had frequent diarrhea since starting the medication, and has been almost 1 year since starting.  She has tried to decrease the dose, which has not been helpful.  Advised her today just to stop the use of metformin, we will need to monitor her closely in a few weeks and ensure her symptoms are improving.  She will be due for her annual checkup at that time anyway, so we will get her scheduled with her PCP.  Labs would be recommended at that time including CBC, CMP, A1c.  Encourage patient to maintain lifestyle modifications, decreasing sugar and carb intake, staying well-hydrated, and regular exercise as tolerated.  Would expect the symptoms " to improve after medication has been discontinued for a few weeks.  If she has any worsening or persistent symptoms, she can follow-up sooner if needed.      Follow Up   Return in about 4 weeks (around 11/21/2024) for Annual physical, Medicare Wellness, Recheck, With Dr. Betts.  Patient was given instructions and counseling regarding her condition or for health maintenance advice. Please see specific information pulled into the AVS if appropriate.     Justen De Jesus, ABI  10/24/2024  This note was electronically signed.

## 2024-10-26 PROCEDURE — 82948 REAGENT STRIP/BLOOD GLUCOSE: CPT

## 2024-11-01 ENCOUNTER — TRANSCRIBE ORDERS (OUTPATIENT)
Dept: ADMINISTRATIVE | Facility: HOSPITAL | Age: 74
End: 2024-11-01
Payer: MEDICARE

## 2024-11-01 DIAGNOSIS — R09.89 BRUIT: Primary | ICD-10-CM

## 2024-11-01 DIAGNOSIS — R07.9 CHEST PAIN, UNSPECIFIED TYPE: Primary | ICD-10-CM

## 2024-11-19 ENCOUNTER — OFFICE VISIT (OUTPATIENT)
Dept: CARDIOLOGY | Facility: CLINIC | Age: 74
End: 2024-11-19
Payer: MEDICARE

## 2024-11-19 VITALS
HEART RATE: 74 BPM | BODY MASS INDEX: 30.21 KG/M2 | SYSTOLIC BLOOD PRESSURE: 114 MMHG | HEIGHT: 65 IN | WEIGHT: 181.3 LBS | DIASTOLIC BLOOD PRESSURE: 57 MMHG

## 2024-11-19 DIAGNOSIS — I10 ESSENTIAL HYPERTENSION: Primary | ICD-10-CM

## 2024-11-19 NOTE — PROGRESS NOTES
Chief Complaint  Follow-up and Hypertension    Subjective            History of Present Illness  Yuliana Da Silva is a 74-year-old female patient who presents to the office today for follow up. She had stress test ordered on 11/1/24 by Dr Paige for complaint of chest pain. This is currently scheduled for 12/3/24. She has hypertension and reports compliance with medication. She denies any other new or worsening cardiac symptoms at this time.     PMH  Past Medical History:   Diagnosis Date    Allergic rhinitis 06/04/2013    Anemia     Asthma     W/ ACUTE EXACERBATION    Atypical chest pain     Cataract     Chronic bronchitis     Cough variant asthma 06/09/2014    Dry eye     Fibromyalgia     Gastroesophageal reflux disease     Headache     Hiatal hernia     HTN (hypertension)     Impaired fasting glucose     Insomnia     Leukocytopenia     Lupus     Palpitations     Pancreatitis     W/ STENT PLACEMENT    Screening for breast cancer 11/2018    WNL    SOB (shortness of breath)          ALLERGY  Allergies   Allergen Reactions    Codeine Hives    Levofloxacin Hives, Diarrhea and Unknown - High Severity    Sulfamethoxazole-Trimethoprim Diarrhea and Unknown - High Severity    Tetracycline Hives and Unknown - High Severity    Tetracyclines & Related GI Intolerance    Sulfa Antibiotics GI Intolerance          SURGICALHX  Past Surgical History:   Procedure Laterality Date    COLONOSCOPY  2008    COLONOSCOPY N/A 3/1/2023    Procedure: COLONOSCOPY WITH POLYPECTOMY;  Surgeon: Risa Ocampo MD;  Location: [facility] ENDOSCOPY;  Service: Gastroenterology;  Laterality: N/A;  COLON POLYP    ENDOSCOPY  2004    HYSTERECTOMY  1998    FOR FIBROIDS. BOTH ovaries removed           SOC  Social History     Socioeconomic History    Marital status:    Tobacco Use    Smoking status: Never    Smokeless tobacco: Never    Tobacco comments:     Grew up exposed to second hand smoke   Vaping Use    Vaping status: Never Used    Substance and Sexual Activity    Alcohol use: Never    Drug use: Never    Sexual activity: Defer         FAMHX  Family History   Problem Relation Age of Onset    Hypertension Mother     Diabetes Father     Lung cancer Brother     Malig Hyperthermia Neg Hx     Colon cancer Neg Hx           MEDSIGONLY  Current Outpatient Medications on File Prior to Visit   Medication Sig    acetaminophen (TYLENOL) 500 MG tablet Take 2 tablets by mouth 3 (Three) Times a Day As Needed for Headache.    amLODIPine (NORVASC) 5 MG tablet Take 1 tablet by mouth Daily.    budesonide-formoterol (Symbicort) 160-4.5 MCG/ACT inhaler Inhale 2 puffs As Needed (Cough).    cetirizine (zyrTEC) 10 MG tablet Take 1 tablet by mouth Daily.    Cholecalciferol (Vitamin D) 50 MCG (2000 UT) tablet Take 1 tablet by mouth Daily.    fexofenadine (ALLEGRA) 60 MG tablet     Flovent  MCG/ACT inhaler Inhale 2 puffs 2 (Two) Times a Day.    fluticasone (FLONASE) 50 MCG/ACT nasal spray Administer 2 sprays into the nostril(s) as directed by provider 2 (Two) Times a Day.    hydroCHLOROthiazide (MICROZIDE) 12.5 MG capsule Daily.    hydroxychloroquine (Plaquenil) 200 MG tablet Take 1 tablet by mouth Daily.    hyoscyamine (ANASPAZ,LEVSIN) 0.125 MG tablet Every 4 (Four) Hours.    Lubricating Plus Eye Drops 0.5 % solution Administer 1 drop to both eyes Daily.    metoprolol succinate XL (TOPROL-XL) 25 MG 24 hr tablet Take 1 tablet by mouth Daily.    mometasone (NASONEX) 50 MCG/ACT nasal spray Administer 2 sprays into the nostril(s) as directed by provider As Needed.    omeprazole OTC (PrilOSEC OTC) 20 MG EC tablet Take 1 tablet by mouth Daily.    pravastatin (PRAVACHOL) 20 MG tablet     ProAir  (90 Base) MCG/ACT inhaler Inhale 2 puffs As Needed.    Spiriva Respimat 1.25 MCG/ACT aerosol solution inhaler     [DISCONTINUED] Omega 3-6-9 Fatty Acids (OMEGA 3-6-9 PO) Take 1 capsule by mouth Daily. (Patient not taking: Reported on 11/19/2024)    [DISCONTINUED]  "Ivanchris Ellipta 200-62.5-25 MCG/ACT inhaler  (Patient not taking: Reported on 11/19/2024)     No current facility-administered medications on file prior to visit.         Objective   /57   Pulse 74   Ht 165.1 cm (65\")   Wt 82.2 kg (181 lb 4.8 oz)   BMI 30.17 kg/m²       Physical Exam  HENT:      Head: Normocephalic.   Neck:      Vascular: No carotid bruit.   Cardiovascular:      Rate and Rhythm: Normal rate and regular rhythm.      Pulses: Normal pulses.      Heart sounds: Normal heart sounds. No murmur heard.  Pulmonary:      Effort: Pulmonary effort is normal.      Breath sounds: Normal breath sounds.   Musculoskeletal:      Cervical back: Neck supple.      Right lower leg: No edema.      Left lower leg: No edema.   Skin:     General: Skin is dry.   Neurological:      Mental Status: She is alert and oriented to person, place, and time.   Psychiatric:         Behavior: Behavior normal.           Result Review :   The following data was reviewed by: ABI Alvarado on 11/19/2024:  No results found for: \"PROBNP\"  CMP          6/3/2024    12:10   CMP   Glucose 110    BUN 10    Creatinine 0.79    EGFR 78.6    Sodium 141    Potassium 3.8    Chloride 103    Calcium 9.5    Total Protein 7.8    Albumin 4.6    Globulin 3.2    Total Bilirubin 0.4    Alkaline Phosphatase 57    AST (SGOT) 26    ALT (SGPT) 23    Albumin/Globulin Ratio 1.4    BUN/Creatinine Ratio 12.7    Anion Gap 12.3      CBC w/diff          6/3/2024    12:10   CBC w/Diff   WBC 5.70    RBC 4.29    Hemoglobin 12.5    Hematocrit 37.4    MCV 87.2    MCH 29.1    MCHC 33.4    RDW 12.3    Platelets 244    Neutrophil Rel % 63.9    Immature Granulocyte Rel % 0.4    Lymphocyte Rel % 29.5    Monocyte Rel % 5.1    Eosinophil Rel % 0.4    Basophil Rel % 0.7       Lab Results   Component Value Date    TSH 1.640 05/01/2023      Lab Results   Component Value Date    FREET4 1.2 04/26/2019      No results found for: \"DDIMERQUANT\"  Magnesium   Date Value Ref " "Range Status   03/09/2021 1.94 1.60 - 2.30 mg/dL Final      No results found for: \"DIGOXIN\"   No results found for: \"TROPONINT\"             Assessment and Plan    Diagnoses and all orders for this visit:    1. Essential hypertension (Primary)  Currently controlled and without adverse effects from medication, continue amlodipine 5 mg daily, hydrochlorothiazide 12.5 mg daily, and metoprolol 25 mg daily. Low sodium diet discussed.   We will review stress test once completed.         Follow Up   Return in about 1 year (around 11/19/2025) for Follow up with Dr Lin.    Patient was given instructions and counseling regarding her condition or for health maintenance advice. Please see specific information pulled into the AVS if appropriate.     Yuliana Da Silva  reports that she has never smoked. She has never used smokeless tobacco.          Haven Garcia, APRN  11/19/24  08:39 EST    Dictated Utilizing Dragon Dictation    "

## 2024-11-21 ENCOUNTER — OFFICE VISIT (OUTPATIENT)
Dept: INTERNAL MEDICINE | Facility: CLINIC | Age: 74
End: 2024-11-21
Payer: MEDICARE

## 2024-11-21 ENCOUNTER — HOSPITAL ENCOUNTER (OUTPATIENT)
Dept: GENERAL RADIOLOGY | Facility: HOSPITAL | Age: 74
Discharge: HOME | End: 2024-11-21
Admitting: INTERNAL MEDICINE
Payer: MEDICARE

## 2024-11-21 VITALS
TEMPERATURE: 98.1 F | BODY MASS INDEX: 31.16 KG/M2 | WEIGHT: 187 LBS | HEART RATE: 78 BPM | OXYGEN SATURATION: 98 % | DIASTOLIC BLOOD PRESSURE: 68 MMHG | SYSTOLIC BLOOD PRESSURE: 122 MMHG | RESPIRATION RATE: 18 BRPM | HEIGHT: 65 IN

## 2024-11-21 DIAGNOSIS — Z00.00 ENCOUNTER FOR SUBSEQUENT ANNUAL WELLNESS VISIT (AWV) IN MEDICARE PATIENT: Primary | ICD-10-CM

## 2024-11-21 DIAGNOSIS — R05.2 SUBACUTE COUGH: ICD-10-CM

## 2024-11-21 PROCEDURE — 1126F AMNT PAIN NOTED NONE PRSNT: CPT | Performed by: INTERNAL MEDICINE

## 2024-11-21 PROCEDURE — G0439 PPPS, SUBSEQ VISIT: HCPCS | Performed by: INTERNAL MEDICINE

## 2024-11-21 PROCEDURE — 71046 X-RAY EXAM CHEST 2 VIEWS: CPT

## 2024-11-21 PROCEDURE — 3078F DIAST BP <80 MM HG: CPT | Performed by: INTERNAL MEDICINE

## 2024-11-21 PROCEDURE — 3074F SYST BP LT 130 MM HG: CPT | Performed by: INTERNAL MEDICINE

## 2024-11-21 PROCEDURE — 1170F FXNL STATUS ASSESSED: CPT | Performed by: INTERNAL MEDICINE

## 2024-11-21 NOTE — PROGRESS NOTES
Subjective   The ABCs of the Annual Wellness Visit  Medicare Wellness Visit      Yuliana Da Silva is a 74 y.o. patient who presents for a Medicare Wellness Visit.    The following portions of the patient's history were reviewed and   updated as appropriate: allergies, current medications, past family history, past medical history, past social history, past surgical history, and problem list.    Compared to one year ago, the patient's physical   health is the same.  Compared to one year ago, the patient's mental   health is the same.    Recent Hospitalizations:  She was not admitted to the hospital during the last year.     Current Medical Providers:  Patient Care Team:  Elicia Betts MD as PCP - General (Pediatrics)    Outpatient Medications Prior to Visit   Medication Sig Dispense Refill    acetaminophen (TYLENOL) 500 MG tablet Take 2 tablets by mouth 3 (Three) Times a Day As Needed for Headache. 50 tablet 0    amLODIPine (NORVASC) 5 MG tablet Take 1 tablet by mouth Daily.      budesonide-formoterol (Symbicort) 160-4.5 MCG/ACT inhaler Inhale 2 puffs As Needed (Cough). 1 each 12    cetirizine (zyrTEC) 10 MG tablet Take 1 tablet by mouth Daily. 90 tablet 1    Cholecalciferol (Vitamin D) 50 MCG (2000 UT) tablet Take 1 tablet by mouth Daily. Vitamin D3      fexofenadine (ALLEGRA) 60 MG tablet       Flovent  MCG/ACT inhaler Inhale 2 puffs 2 (Two) Times a Day.      fluticasone (FLONASE) 50 MCG/ACT nasal spray Administer 2 sprays into the nostril(s) as directed by provider 2 (Two) Times a Day.      hydroCHLOROthiazide (MICROZIDE) 12.5 MG capsule Daily.      hydroxychloroquine (Plaquenil) 200 MG tablet Take 1 tablet by mouth Daily.      hyoscyamine (ANASPAZ,LEVSIN) 0.125 MG tablet Every 4 (Four) Hours.      Lubricating Plus Eye Drops 0.5 % solution Administer 1 drop to both eyes Daily.      metoprolol succinate XL (TOPROL-XL) 25 MG 24 hr tablet Take 1 tablet by mouth Daily.      mometasone  "(NASONEX) 50 MCG/ACT nasal spray Administer 2 sprays into the nostril(s) as directed by provider As Needed.      omeprazole OTC (PrilOSEC OTC) 20 MG EC tablet Take 1 tablet by mouth Daily.      pravastatin (PRAVACHOL) 20 MG tablet       ProAir  (90 Base) MCG/ACT inhaler Inhale 2 puffs As Needed.      Spiriva Respimat 1.25 MCG/ACT aerosol solution inhaler        No facility-administered medications prior to visit.     No opioid medication identified on active medication list. I have reviewed chart for other potential  high risk medication/s and harmful drug interactions in the elderly.      Aspirin is not on active medication list.  Aspirin use is not indicated based on review of current medical condition/s. Risk of harm outweighs potential benefits.  .    Patient Active Problem List   Diagnosis    Essential hypertension    Impaired fasting glucose    Weight gain    Pre-diabetes    Allergic rhinitis    Abdominal hernia    Anemia    Cataract    Asthma    Cough variant asthma    Esophageal reflux    Leukocytopenia    Systemic lupus erythematosus    Screening for breast cancer    Insomnia    Bronchitis    Fibromyalgia     Advance Care Planning Advance Directive is on file.  ACP discussion was held with the patient during this visit. Patient has an advance directive in EMR which is still valid.             Objective   Vitals:    11/21/24 1119   BP: 122/68   BP Location: Left arm   Patient Position: Sitting   Cuff Size: Adult   Pulse: 78   Resp: 18   Temp: 98.1 °F (36.7 °C)   TempSrc: Temporal   SpO2: 98%   Weight: 84.8 kg (187 lb)   Height: 165.1 cm (65\")       Estimated body mass index is 31.12 kg/m² as calculated from the following:    Height as of this encounter: 165.1 cm (65\").    Weight as of this encounter: 84.8 kg (187 lb).            Does the patient have evidence of cognitive impairment? No                                                                                               Health  Risk " Assessment    Smoking Status:  Social History     Tobacco Use   Smoking Status Never   Smokeless Tobacco Never   Tobacco Comments    Grew up exposed to second hand smoke     Alcohol Consumption:  Social History     Substance and Sexual Activity   Alcohol Use Never       Fall Risk Screen  STEADI Fall Risk Assessment was completed, and patient is at LOW risk for falls.Assessment completed on:2024    Depression Screening   Little interest or pleasure in doing things? Not at all   Feeling down, depressed, or hopeless? Not at all   PHQ-2 Total Score 0      Health Habits and Functional and Cognitive Screenin/21/2024    11:26 AM   Functional & Cognitive Status   Do you have difficulty preparing food and eating? No   Do you have difficulty bathing yourself, getting dressed or grooming yourself? No   Do you have difficulty using the toilet? No   Do you have difficulty moving around from place to place? No   Do you have trouble with steps or getting out of a bed or a chair? No   Current Diet Well Balanced Diet   Dental Exam Up to date   Eye Exam Up to date   Exercise (times per week) 0 times per week   Current Exercises Include Dancing;No Regular Exercise;House Cleaning   Do you need help using the phone?  No   Are you deaf or do you have serious difficulty hearing?  No   Do you need help to go to places out of walking distance? No   Do you need help shopping? No   Do you need help preparing meals?  No   Do you need help with housework?  No   Do you need help with laundry? No   Do you need help taking your medications? No   Do you need help managing money? No   Do you ever drive or ride in a car without wearing a seat belt? No   Have you felt unusual stress, anger or loneliness in the last month? No   Who do you live with? Spouse   If you need help, do you have trouble finding someone available to you? No   Do you have difficulty concentrating, remembering or making decisions? No           Age-appropriate  Screening Schedule:  Refer to the list below for future screening recommendations based on patient's age, sex and/or medical conditions. Orders for these recommended tests are listed in the plan section. The patient has been provided with a written plan.    Health Maintenance List  Health Maintenance   Topic Date Due    TDAP/TD VACCINES (1 - Tdap) Never done    Pneumococcal Vaccine 65+ (3 of 3 - PPSV23 or PCV20) 10/03/2020    COVID-19 Vaccine (2 - 2024-25 season) 09/01/2024    LIPID PANEL  12/19/2024    BMI FOLLOWUP  06/03/2025    ANNUAL WELLNESS VISIT  11/21/2025    DXA SCAN  02/13/2026    COLORECTAL CANCER SCREENING  03/01/2026    HEPATITIS C SCREENING  Completed    RSV Vaccine - Adults  Completed    INFLUENZA VACCINE  Completed    ZOSTER VACCINE  Completed    MAMMOGRAM  Discontinued    URINE MICROALBUMIN  Discontinued                                                                                                                                                CMS Preventative Services Quick Reference  Risk Factors Identified During Encounter  None Identified    The above risks/problems have been discussed with the patient.  Pertinent information has been shared with the patient in the After Visit Summary.  An After Visit Summary and PPPS were made available to the patient.    Follow Up:   Next Medicare Wellness visit to be scheduled in 1 year.     Assessment & Plan  Encounter for subsequent annual wellness visit (AWV) in Medicare patient  Screening labs reviewed/ordered  Counseling provided regarding age appropriate screenings and immunizations, healthy diet and exercise.        Subacute cough    Orders:    XR Chest PA & Lateral; Future         Follow Up:   No follow-ups on file.

## 2024-11-22 ENCOUNTER — TELEPHONE (OUTPATIENT)
Dept: INTERNAL MEDICINE | Facility: CLINIC | Age: 74
End: 2024-11-22
Payer: MEDICARE

## 2024-11-22 NOTE — TELEPHONE ENCOUNTER
Caller: Yuliana Da Silva    Relationship: Self    Best call back number: [phone]    What test was performed: XRAY     When was the test performed: 11/21/2024    Where was the test performed: [facility]     Additional notes: PATIENT IS CHECKING ON XRAY RESULTS.

## 2024-11-22 NOTE — TELEPHONE ENCOUNTER
Name: Yuliana Da Silva      Relationship: Self      Best Callback Number: [phone]       [facility] PROVIDED THE RELAY MESSAGE FROM THE OFFICE      PATIENT: VOICED UNDERSTANDING AND HAS NO FURTHER QUESTIONS AT THIS TIME    ADDITIONAL INFORMATION:

## 2024-11-22 NOTE — TELEPHONE ENCOUNTER
"Call pt lv to call us back    Relay     \"Xray results are not back yet, we will call her once provider review them\"                  "

## 2024-11-25 ENCOUNTER — TELEPHONE (OUTPATIENT)
Dept: INTERNAL MEDICINE | Facility: CLINIC | Age: 74
End: 2024-11-25
Payer: MEDICARE

## 2024-11-25 NOTE — TELEPHONE ENCOUNTER
[facility] staff attempted to follow warm transfer process and was unsuccessful     Caller: Yuliana Da Silva    Relationship to patient: Self    Best call back number: [phone]     Patient is needing: CALLER STATED: RETURNING CALL AND REQUESTING A CALL BACK

## 2024-11-29 ENCOUNTER — APPOINTMENT (OUTPATIENT)
Dept: CT IMAGING | Facility: HOSPITAL | Age: 74
End: 2024-11-29
Payer: MEDICARE

## 2024-11-29 ENCOUNTER — HOSPITAL ENCOUNTER (EMERGENCY)
Facility: HOSPITAL | Age: 74
Discharge: HOME | End: 2024-11-29
Attending: EMERGENCY MEDICINE
Payer: MEDICARE

## 2024-11-29 VITALS
HEIGHT: 65 IN | SYSTOLIC BLOOD PRESSURE: 146 MMHG | TEMPERATURE: 98 F | DIASTOLIC BLOOD PRESSURE: 100 MMHG | OXYGEN SATURATION: 99 % | WEIGHT: 187.39 LBS | BODY MASS INDEX: 31.22 KG/M2 | RESPIRATION RATE: 14 BRPM | HEART RATE: 76 BPM

## 2024-11-29 DIAGNOSIS — L03.90 CELLULITIS, UNSPECIFIED CELLULITIS SITE: Primary | ICD-10-CM

## 2024-11-29 DIAGNOSIS — K08.89 PAIN, DENTAL: ICD-10-CM

## 2024-11-29 DIAGNOSIS — R22.0 FACIAL SWELLING: ICD-10-CM

## 2024-11-29 LAB
ALBUMIN SERPL-MCNC: 4.6 G/DL (ref 3.5–5.2)
ALBUMIN/GLOB SERPL: 1.2 G/DL
ALP SERPL-CCNC: 83 U/L (ref 39–117)
ALT SERPL W P-5'-P-CCNC: 19 U/L (ref 1–33)
ANION GAP SERPL CALCULATED.3IONS-SCNC: 14.4 MMOL/L (ref 5–15)
AST SERPL-CCNC: 26 U/L (ref 1–32)
BASOPHILS # BLD AUTO: 0.05 10*3/MM3 (ref 0–0.2)
BASOPHILS NFR BLD AUTO: 0.7 % (ref 0–1.5)
BILIRUB SERPL-MCNC: 0.5 MG/DL (ref 0–1.2)
BUN SERPL-MCNC: 16 MG/DL (ref 8–23)
BUN/CREAT SERPL: 18.2 (ref 7–25)
CALCIUM SPEC-SCNC: 9.8 MG/DL (ref 8.6–10.5)
CHLORIDE SERPL-SCNC: 101 MMOL/L (ref 98–107)
CO2 SERPL-SCNC: 25.6 MMOL/L (ref 22–29)
CREAT SERPL-MCNC: 0.88 MG/DL (ref 0.57–1)
DEPRECATED RDW RBC AUTO: 43.4 FL (ref 37–54)
EGFRCR SERPLBLD CKD-EPI 2021: 68.6 ML/MIN/1.73
EOSINOPHIL # BLD AUTO: 0.17 10*3/MM3 (ref 0–0.4)
EOSINOPHIL NFR BLD AUTO: 2.3 % (ref 0.3–6.2)
ERYTHROCYTE [DISTWIDTH] IN BLOOD BY AUTOMATED COUNT: 13.3 % (ref 12.3–15.4)
GLOBULIN UR ELPH-MCNC: 3.7 GM/DL
GLUCOSE SERPL-MCNC: 114 MG/DL (ref 65–99)
HCT VFR BLD AUTO: 42.8 % (ref 34–46.6)
HGB BLD-MCNC: 13.8 G/DL (ref 12–15.9)
HOLD SPECIMEN: NORMAL
HOLD SPECIMEN: NORMAL
IMM GRANULOCYTES # BLD AUTO: 0.02 10*3/MM3 (ref 0–0.05)
IMM GRANULOCYTES NFR BLD AUTO: 0.3 % (ref 0–0.5)
LYMPHOCYTES # BLD AUTO: 2.57 10*3/MM3 (ref 0.7–3.1)
LYMPHOCYTES NFR BLD AUTO: 34.1 % (ref 19.6–45.3)
MCH RBC QN AUTO: 28.6 PG (ref 26.6–33)
MCHC RBC AUTO-ENTMCNC: 32.2 G/DL (ref 31.5–35.7)
MCV RBC AUTO: 88.6 FL (ref 79–97)
MONOCYTES # BLD AUTO: 0.6 10*3/MM3 (ref 0.1–0.9)
MONOCYTES NFR BLD AUTO: 8 % (ref 5–12)
NEUTROPHILS NFR BLD AUTO: 4.13 10*3/MM3 (ref 1.7–7)
NEUTROPHILS NFR BLD AUTO: 54.6 % (ref 42.7–76)
NRBC BLD AUTO-RTO: 0 /100 WBC (ref 0–0.2)
PLATELET # BLD AUTO: 141 10*3/MM3 (ref 140–450)
PMV BLD AUTO: 10.4 FL (ref 6–12)
POTASSIUM SERPL-SCNC: 3.5 MMOL/L (ref 3.5–5.2)
PROT SERPL-MCNC: 8.3 G/DL (ref 6–8.5)
RBC # BLD AUTO: 4.83 10*6/MM3 (ref 3.77–5.28)
SODIUM SERPL-SCNC: 141 MMOL/L (ref 136–145)
WBC NRBC COR # BLD AUTO: 7.54 10*3/MM3 (ref 3.4–10.8)
WHOLE BLOOD HOLD COAG: NORMAL
WHOLE BLOOD HOLD SPECIMEN: NORMAL

## 2024-11-29 PROCEDURE — 70487 CT MAXILLOFACIAL W/DYE: CPT

## 2024-11-29 PROCEDURE — 80053 COMPREHEN METABOLIC PANEL: CPT

## 2024-11-29 PROCEDURE — 85025 COMPLETE CBC W/AUTO DIFF WBC: CPT

## 2024-11-29 PROCEDURE — 99285 EMERGENCY DEPT VISIT HI MDM: CPT

## 2024-11-29 PROCEDURE — 25510000001 IOPAMIDOL PER 1 ML: Performed by: EMERGENCY MEDICINE

## 2024-11-29 PROCEDURE — 36415 COLL VENOUS BLD VENIPUNCTURE: CPT

## 2024-11-29 RX ORDER — IOPAMIDOL 755 MG/ML
100 INJECTION, SOLUTION INTRAVASCULAR
Status: COMPLETED | OUTPATIENT
Start: 2024-11-29 | End: 2024-11-29

## 2024-11-29 RX ADMIN — BENZOCAINE: 200 GEL DENTAL; ORAL; PERIODONTAL at 04:53

## 2024-11-29 RX ADMIN — IOPAMIDOL 100 ML: 755 INJECTION, SOLUTION INTRAVENOUS at 05:55

## 2024-11-29 NOTE — DISCHARGE INSTRUCTIONS
Your CT imaging shows that there is possible infection around your left cheek. This is most likely coming from your dental implant. You are being discharged home with Augmentin. Take this medication as prescribed.    Follow up with your PCP in 3-5 days.     Follow up with your Dentist in 3-5 days.    Return to the Emergency Department if you develop any uncontrollable fever, intractable pain, nausea, vomiting.

## 2024-11-29 NOTE — ED PROVIDER NOTES
Time: 4:22 AM EST  Date of encounter:  11/29/2024  Independent Historian/Clinical History and Information was obtained by:   Patient    History is limited by: N/A    Chief Complaint: Left-sided facial swelling      History of Present Illness:  Patient is a 74 y.o. year old female who presents to the emergency department for evaluation of left-sided facial swelling.  Patient states that she has been having some dental pain for about a week.  She got up tonight to do her mouthwash when she noticed worsening pain on her left side and facial swelling.  States that she has been doing a hydrogen peroxide and salt water wash.  She does go to the dentist every 6 months.  She has had some chills but denies any fever, nausea, vomiting.      Patient Care Team  Primary Care Provider: Elicia Betts MD    Past Medical History:     Allergies   Allergen Reactions    Codeine Hives    Levofloxacin Hives, Diarrhea and Unknown - High Severity    Sulfamethoxazole-Trimethoprim Diarrhea and Unknown - High Severity    Tetracycline Hives and Unknown - High Severity    Tetracyclines & Related GI Intolerance    Sulfa Antibiotics GI Intolerance     Past Medical History:   Diagnosis Date    Allergic rhinitis 06/04/2013    Anemia     Asthma     W/ ACUTE EXACERBATION    Atypical chest pain     Cataract     Chronic bronchitis     Cough variant asthma 06/09/2014    Dry eye     Fibromyalgia     Gastroesophageal reflux disease     Headache     Hiatal hernia     HTN (hypertension)     Impaired fasting glucose     Insomnia     Leukocytopenia     Lupus     Palpitations     Pancreatitis     W/ STENT PLACEMENT    Screening for breast cancer 11/2018    WNL    SOB (shortness of breath)      Past Surgical History:   Procedure Laterality Date    COLONOSCOPY  2008    COLONOSCOPY N/A 3/1/2023    Procedure: COLONOSCOPY WITH POLYPECTOMY;  Surgeon: Risa Ocampo MD;  Location: [facility] END[facility];  Service: Gastroenterology;  Laterality: N/A;   COLON POLYP    ENDOSCOPY  2004    HYSTERECTOMY  1998    FOR FIBROIDS. BOTH ovaries removed      Family History   Problem Relation Age of Onset    Hypertension Mother     Diabetes Father     Lung cancer Brother     [place] Hyperthermia Neg Hx     Colon cancer Neg Hx        Home Medications:  Prior to Admission medications    Medication Sig Start Date End Date Taking? Authorizing Provider   acetaminophen (TYLENOL) 500 MG tablet Take 2 tablets by mouth 3 (Three) Times a Day As Needed for Headache. 5/31/24   Kulwant Nava MD   amLODIPine (NORVASC) 5 MG tablet Take 1 tablet by mouth Daily.    Alex Solorzano MD   budesonide-formoterol (Symbicort) 160-4.5 MCG/ACT inhaler Inhale 2 puffs As Needed (Cough). 5/31/22   Casimiro Benavidez MD   cetirizine (zyrTEC) 10 MG tablet Take 1 tablet by mouth Daily. 9/19/23   Elicia Betts MD   Cholecalciferol (Vitamin D) 50 MCG (2000 UT) tablet Take 1 tablet by mouth Daily. Vitamin D3    Alex Solorzano MD   fexofenadine (ALLEGRA) 60 MG tablet  6/6/24   Alex Solorzano MD   Flovent  MCG/ACT inhaler Inhale 2 puffs 2 (Two) Times a Day. 12/1/22   Alex Solorzano MD   fluticasone (FLONASE) 50 MCG/ACT nasal spray Administer 2 sprays into the nostril(s) as directed by provider 2 (Two) Times a Day. 3/28/22   Alex Solorzano MD   hydroCHLOROthiazide (MICROZIDE) 12.5 MG capsule Daily.    Alex Solorzano MD   hydroxychloroquine (Plaquenil) 200 MG tablet Take 1 tablet by mouth Daily. 7/30/24   Alex Solorzano MD   hyoscyamine (ANASPAZ,LEVSIN) 0.125 MG tablet Every 4 (Four) Hours.    Alex Solorzano MD   Lubricating Plus Eye Drops 0.5 % solution Administer 1 drop to both eyes Daily. 7/28/21   Alex Solorzano MD   metoprolol succinate XL (TOPROL-XL) 25 MG 24 hr tablet Take 1 tablet by mouth Daily.    Alex Solorzano MD   mometasone (NASONEX) 50 MCG/ACT nasal spray Administer 2 sprays into the nostril(s) as directed by  "provider As Needed.    Alex Solorzano MD   omeprazole OTC (PrilOSEC OTC) 20 MG EC tablet Take 1 tablet by mouth Daily.    Alex Solorzano MD   pravastatin (PRAVACHOL) 20 MG tablet  7/28/23   Alex Solorzano MD   ProAir  (90 Base) MCG/ACT inhaler Inhale 2 puffs As Needed. 12/29/22   Alex Solorzano MD   SITagliptin (Januvia) 50 MG tablet Take 1 tablet by mouth Daily. 11/21/24   Elicia Betts MD   Spiriva Respimat 1.25 MCG/ACT aerosol solution inhaler  2/8/24   Alex Solorzano MD        Social History:   Social History     Tobacco Use    Smoking status: Never    Smokeless tobacco: Never    Tobacco comments:     Grew up exposed to second hand smoke   Vaping Use    Vaping status: Never Used   Substance Use Topics    Alcohol use: Never    Drug use: Never         Review of Systems:  Review of Systems   Constitutional:  Negative for chills and fever.   HENT:  Positive for dental problem and facial swelling. Negative for ear pain.    Eyes:  Negative for pain.   Respiratory:  Negative for cough and shortness of breath.    Cardiovascular:  Negative for chest pain.   Gastrointestinal:  Negative for abdominal pain, diarrhea, nausea and vomiting.   Genitourinary:  Negative for dysuria.   Musculoskeletal:  Negative for arthralgias.   Skin:  Negative for rash.   Neurological:  Negative for headaches.        Physical Exam:  /100 (Patient Position: Sitting)   Pulse 76   Temp 98 °F (36.7 °C) (Oral)   Resp 14   Ht 165.1 cm (65\")   Wt 85 kg (187 lb 6.3 oz)   LMP  (LMP Unknown)   SpO2 99%   BMI 31.18 kg/m²     Physical Exam  Vitals and nursing note reviewed.   Constitutional:       Appearance: Normal appearance.   HENT:      Head: Normocephalic and atraumatic.        Nose: Nose normal.   Eyes:      Extraocular Movements: Extraocular movements intact.      Conjunctiva/sclera: Conjunctivae normal.      Pupils: Pupils are equal, round, and reactive to light.   Cardiovascular: "      Rate and Rhythm: Normal rate and regular rhythm.      Heart sounds: Normal heart sounds.   Pulmonary:      Effort: Pulmonary effort is normal.      Breath sounds: Normal breath sounds.   Abdominal:      General: Abdomen is flat.      Palpations: Abdomen is soft.   Musculoskeletal:         General: Normal range of motion.      Cervical back: Normal range of motion and neck supple.   Skin:     General: Skin is warm and dry.   Neurological:      General: No focal deficit present.      Mental Status: She is alert and oriented to person, place, and time.   Psychiatric:         Mood and Affect: Mood normal.         Behavior: Behavior normal.                  Procedures:  Procedures      Medical Decision Making:      Comorbidities that affect care:    Anemia, hiatal hernia, hypertension    External Notes reviewed:    None      The following orders were placed and all results were independently analyzed by me:  Orders Placed This Encounter   Procedures    CT Facial Bones With Contrast    Comprehensive Metabolic Panel    [place] Draw    CBC Auto Differential    CBC & Differential    Green Top (Gel)    Lavender Top    Gold Top - SST    Light Blue Top       Medications Given in the Emergency Department:  Medications   benzocaine (HURRICAINE/ORAJEL) 20 % jelly ( Mouth/Throat Given 11/29/24 0453)   iopamidol (ISOVUE-370) 76 % injection 100 mL (100 mL Intravenous Given 11/29/24 0555)        ED Course:         Labs:    Lab Results (last 24 hours)       Procedure Component Value Units Date/Time    CBC & Differential [[id number]]  (Normal) Collected: 11/29/24 0449    Specimen: Blood from Arm, Right Updated: 11/29/24 0457    Narrative:      The following orders were created for panel order CBC & Differential.  Procedure                               Abnormality         Status                     ---------                               -----------         ------                     CBC Auto Differential[[id number]]        Normal               Final result                 Please view results for these tests on the individual orders.    Comprehensive Metabolic Panel [[id number]]  (Abnormal) Collected: 11/29/24 0449    Specimen: Blood from Arm, Right Updated: 11/29/24 0523     Glucose 114 mg/dL      BUN 16 mg/dL      Creatinine 0.88 mg/dL      Sodium 141 mmol/L      Potassium 3.5 mmol/L      Comment: Slight hemolysis detected by analyzer. Result may be falsely elevated.        Chloride 101 mmol/L      CO2 25.6 mmol/L      Calcium 9.8 mg/dL      Total Protein 8.3 g/dL      Albumin 4.6 g/dL      ALT (SGPT) 19 U/L      AST (SGOT) 26 U/L      Alkaline Phosphatase 83 U/L      Total Bilirubin 0.5 mg/dL      Globulin 3.7 gm/dL      A/G Ratio 1.2 g/dL      BUN/Creatinine Ratio 18.2     Anion Gap 14.4 mmol/L      eGFR 68.6 mL/min/1.73     Narrative:      GFR Normal >60  Chronic Kidney Disease <60  Kidney Failure <15    The GFR formula is only valid for adults with stable renal function between ages 18 and 70.    CBC Auto Differential [[id number]]  (Normal) Collected: 11/29/24 0449    Specimen: Blood from Arm, Right Updated: 11/29/24 0457     WBC 7.54 10*3/mm3      RBC 4.83 10*6/mm3      Hemoglobin 13.8 g/dL      Hematocrit 42.8 %      MCV 88.6 fL      MCH 28.6 pg      MCHC 32.2 g/dL      RDW 13.3 %      RDW-SD 43.4 fl      MPV 10.4 fL      Platelets 141 10*3/mm3      Neutrophil % 54.6 %      Lymphocyte % 34.1 %      Monocyte % 8.0 %      Eosinophil % 2.3 %      Basophil % 0.7 %      Immature Grans % 0.3 %      Neutrophils, Absolute 4.13 10*3/mm3      Lymphocytes, Absolute 2.57 10*3/mm3      Monocytes, Absolute 0.60 10*3/mm3      Eosinophils, Absolute 0.17 10*3/mm3      Basophils, Absolute 0.05 10*3/mm3      Immature Grans, Absolute 0.02 10*3/mm3      nRBC 0.0 /100 WBC              Imaging:    CT Facial Bones With Contrast    Result Date: 11/29/2024  CT FACIAL BONES W CONTRAST Date of Exam: 11/29/2024 5:40 AM EST Indication: left facial swelling. Comparison:  12/7/2017 Technique: Axial CT images were obtained from the inferior aspect of the mandible through the frontal sinuses after the uneventful intravenous administration of iodinated contrast.  Reconstructed coronal and sagittal images were also obtained. Automated exposure control and iterative construction methods were used. Findings: Temporomandibular joints demonstrate normal alignment. No acute facial bone fractures are identified. No evidence of acute sinus disease. There is some minimal chronic mucosal thickening in the floor of the left maxillary sinus. The middle ear cavities and mastoid air cells are clear. Nasal septum is midline. The globes and orbits appear within normal limits. Salivary glands are normal. There is some subcutaneous fat stranding involving the left cheek. At the same level, there is extensive streak artifact from dental hardware. No definite abscess is identified. There is no soft tissue gas. Floor of the mouth is normal. Larynx is normal. Epiglottis is normal.     1.There is some subcutaneous fat stranding involving the left cheek. At the same level, there is extensive streak artifact from dental hardware. No definite abscess is identified. There is no soft tissue gas. 2.No acute facial bone fractures. 3.Minimal chronic mucosal thickening in the floor of the left maxillary sinus. No evidence of acute sinus disease. Electronically Signed: Ross Ha MD  11/29/2024 6:03 AM EST  Workstation ID: [id number]       Differential Diagnosis and Discussion:    Facial Pain/Swelling: Differential diagnosis includes but is not limited to temporal arteritis, intracranial tumors, neuralgias, dental disease, ocular disease, TMJ syndrome, salivary gland disorders, sinusitis, cluster headaches, migraines, and psychogenic.    All labs were reviewed and interpreted by me.  CT scan radiology impression was interpreted by me.    MDM     Amount and/or Complexity of Data Reviewed  Clinical lab tests:  reviewed  Tests in the radiology section of CPT®: reviewed    Risk of Complications, Morbidity, and/or Mortality  Presenting problems: moderate  Diagnostic procedures: moderate  Management options: low    Patient Progress  Patient progress: stable    Patient presents to the emergency department for evaluation of left-sided facial swelling.  Patient states that she has been having some dental pain for about a week.  She got up tonight to do her mouthwash when she noticed worsening pain on her left side and facial swelling.  States that she has been doing a hydrogen peroxide and salt water wash.  She does go to the dentist every 6 months.  She has had some chills but denies any fever, nausea, vomiting.    On exam  Indurated mass, tender to palpation.  No erythema.  Positive swelling.  No dental abscess appreciated.    The patient´s CBC that was reviewed and interpreted by me shows no abnormalities of critical concern. Of note, there is no anemia requiring a blood transfusion and the platelet count is acceptable.    The patient´s CMP that was reviewed and interpretted by me shows no abnormalities of critical concern. Of note, the patient´s sodium and potassium are acceptable. The patient´s liver enzymes are unremarkable. The patient´s renal function (creatinine) is preserved. The patient has a normal anion gap.    CT Facial Bones With Contrast   Final Result   1.There is some subcutaneous fat stranding involving the left cheek. At the same level, there is extensive streak artifact from dental hardware. No definite abscess is identified. There is no soft tissue gas.   2.No acute facial bone fractures.   3.Minimal chronic mucosal thickening in the floor of the left maxillary sinus. No evidence of acute sinus disease.            Electronically Signed: Ross Ha MD     11/29/2024 6:03 AM EST     Workstation ID: [id number]        Discussed the imaging findings with the patient.  Will discharge the patient with  Augmentin.  Patient will continue to take Tylenol for pain.  Follow-up with your PCP in 3 to 5 days especially if symptoms worsen.              Patient Care Considerations:    SEPSIS was considered but is NOT present in the emergency department as SIRS criteria is not present.      Consultants/Shared Management Plan:    None    Social Determinants of Health:    Patient is independent, reliable, and has access to care.       Disposition and Care Coordination:    Discharged: The patient is suitable and stable for discharge with no need for consideration of admission.    I have explained the patient´s condition, diagnoses and treatment plan based on the information available to me at this time. I have answered questions and addressed any concerns. The patient has a good  understanding of the patient´s diagnosis, condition, and treatment plan as can be expected at this point. The vital signs have been stable. The patient´s condition is stable and appropriate for discharge from the emergency department.      The patient will pursue further outpatient evaluation with the primary care physician or other designated or consulting physician as outlined in the discharge instructions. They are agreeable to this plan of care and follow-up instructions have been explained in detail. The patient has received these instructions in written format and has expressed an understanding of the discharge instructions. The patient is aware that any significant change in condition or worsening of symptoms should prompt an immediate return to this or the closest emergency department or call to 911.  I have explained discharge medications and the need for follow up with the patient/caretakers. This was also printed in the discharge instructions. Patient was discharged with the following medications and follow up:      Medication List        New Prescriptions      amoxicillin-clavulanate 875-125 MG per tablet  Commonly known as: AUGMENTIN  Take  1 tablet by mouth 2 (Two) Times a Day for 7 days.               Where to Get Your Medications        These medications were sent to [facility]/pharmacy #[id number] - [place], [place] - [address] - [phone] [facility] [phone] FX  [address] [place] [address]      Hours: 24-hours Phone: [phone]   amoxicillin-clavulanate 875-125 MG per tablet      No follow-up provider specified.     Final diagnoses:   Cellulitis, unspecified cellulitis site   Facial swelling   Pain, dental        ED Disposition       ED Disposition   Discharge    Condition   Stable    Comment   --               This medical record created using voice recognition software.             Richmond Silvestre PA  11/29/24 0614

## 2024-12-03 ENCOUNTER — TELEPHONE (OUTPATIENT)
Dept: INTERNAL MEDICINE | Facility: CLINIC | Age: 74
End: 2024-12-03
Payer: MEDICARE

## 2024-12-03 NOTE — TELEPHONE ENCOUNTER
Caller: Yuliaan Da Silva    Relationship: Self    Best call back number:     [phone] (Mobile)       What medication are you requesting: DIFLUCAN    What are your current symptoms: YEAST INFECTION    How long have you been experiencing symptoms: SINCE 11/29/2024    Have you had these symptoms before:    [x] Yes  [] No    Have you been treated for these symptoms before:   [x] Yes  [] No    If a prescription is needed, what is your preferred pharmacy and phone number: [facility] [place], [place] - [address] [address] - [phone] [facility] [phone]      Additional notes:  PATIENT WAS GIVEN ANTIBIOTICS IN EMERGENCY ROOM ON 11/29/2024 AND IS HAVING YEAST INFECTION SYMPTOMS

## 2024-12-04 RX ORDER — FLUCONAZOLE 150 MG/1
150 TABLET ORAL
Qty: 2 TABLET | Refills: 0 | Status: SHIPPED | OUTPATIENT
Start: 2024-12-04 | End: 2024-12-08

## 2024-12-19 ENCOUNTER — HOSPITAL ENCOUNTER (OUTPATIENT)
Dept: CARDIOLOGY | Facility: HOSPITAL | Age: 74
Discharge: HOME | End: 2024-12-19
Admitting: SPECIALIST
Payer: MEDICARE

## 2024-12-19 DIAGNOSIS — R09.89 BRUIT: ICD-10-CM

## 2024-12-19 LAB
BH CV XLRA MEAS LEFT CAROTID BULB EDV: 12.6 CM/SEC
BH CV XLRA MEAS LEFT CAROTID BULB PSV: 32.4 CM/SEC
BH CV XLRA MEAS LEFT DIST CCA EDV: 14.8 CM/SEC
BH CV XLRA MEAS LEFT DIST CCA PSV: 61.5 CM/SEC
BH CV XLRA MEAS LEFT DIST ICA EDV: 41.7 CM/SEC
BH CV XLRA MEAS LEFT DIST ICA PSV: 111.5 CM/SEC
BH CV XLRA MEAS LEFT ICA/CCA RATIO: 1
BH CV XLRA MEAS LEFT MID ICA EDV: 32.4 CM/SEC
BH CV XLRA MEAS LEFT MID ICA PSV: 82.9 CM/SEC
BH CV XLRA MEAS LEFT PROX CCA EDV: 18.1 CM/SEC
BH CV XLRA MEAS LEFT PROX CCA PSV: 73.1 CM/SEC
BH CV XLRA MEAS LEFT PROX ECA EDV: 11 CM/SEC
BH CV XLRA MEAS LEFT PROX ECA PSV: 59.3 CM/SEC
BH CV XLRA MEAS LEFT PROX ICA EDV: 24.2 CM/SEC
BH CV XLRA MEAS LEFT PROX ICA PSV: 62.6 CM/SEC
BH CV XLRA MEAS LEFT VERTEBRAL A EDV: 19.8 CM/SEC
BH CV XLRA MEAS LEFT VERTEBRAL A PSV: 57.1 CM/SEC
BH CV XLRA MEAS RIGHT CAROTID BULB EDV: 8.9 CM/SEC
BH CV XLRA MEAS RIGHT CAROTID BULB PSV: 32 CM/SEC
BH CV XLRA MEAS RIGHT DIST CCA EDV: 16.3 CM/SEC
BH CV XLRA MEAS RIGHT DIST CCA PSV: 74 CM/SEC
BH CV XLRA MEAS RIGHT DIST ICA EDV: 29.7 CM/SEC
BH CV XLRA MEAS RIGHT DIST ICA PSV: 103.8 CM/SEC
BH CV XLRA MEAS RIGHT ICA/CCA RATIO: 1
BH CV XLRA MEAS RIGHT MID ICA EDV: 41.2 CM/SEC
BH CV XLRA MEAS RIGHT MID ICA PSV: 118.1 CM/SEC
BH CV XLRA MEAS RIGHT PROX CCA EDV: 16.4 CM/SEC
BH CV XLRA MEAS RIGHT PROX CCA PSV: 77.8 CM/SEC
BH CV XLRA MEAS RIGHT PROX ECA EDV: 18.1 CM/SEC
BH CV XLRA MEAS RIGHT PROX ECA PSV: 92.8 CM/SEC
BH CV XLRA MEAS RIGHT PROX ICA EDV: 26.7 CM/SEC
BH CV XLRA MEAS RIGHT PROX ICA PSV: 73.8 CM/SEC
BH CV XLRA MEAS RIGHT VERTEBRAL A EDV: 9.9 CM/SEC
BH CV XLRA MEAS RIGHT VERTEBRAL A PSV: 31.3 CM/SEC
LEFT ARM BP: NORMAL MMHG
RIGHT ARM BP: NORMAL MMHG

## 2024-12-19 PROCEDURE — 93880 EXTRACRANIAL BILAT STUDY: CPT

## 2024-12-27 ENCOUNTER — HOSPITAL ENCOUNTER (OUTPATIENT)
Dept: NUCLEAR MEDICINE | Facility: HOSPITAL | Age: 74
Discharge: HOME | End: 2024-12-27
Payer: MEDICARE

## 2024-12-27 DIAGNOSIS — R07.9 CHEST PAIN, UNSPECIFIED TYPE: ICD-10-CM

## 2024-12-27 PROCEDURE — 93017 CV STRESS TEST TRACING ONLY: CPT

## 2024-12-27 PROCEDURE — 25010000002 REGADENOSON 0.4 MG/5ML SOLUTION: Performed by: SPECIALIST

## 2024-12-27 PROCEDURE — A9502 TC99M TETROFOSMIN: HCPCS | Performed by: SPECIALIST

## 2024-12-27 PROCEDURE — 34310000005 TECHNETIUM TETROFOSMIN KIT: Performed by: SPECIALIST

## 2024-12-27 PROCEDURE — 78452 HT MUSCLE IMAGE SPECT MULT: CPT

## 2024-12-27 RX ORDER — REGADENOSON 0.08 MG/ML
0.4 INJECTION, SOLUTION INTRAVENOUS
Status: COMPLETED | OUTPATIENT
Start: 2024-12-27 | End: 2024-12-27

## 2024-12-27 RX ADMIN — TETROFOSMIN 1 DOSE: 1.38 INJECTION, POWDER, LYOPHILIZED, FOR SOLUTION INTRAVENOUS at 08:05

## 2024-12-27 RX ADMIN — TETROFOSMIN 1 DOSE: 1.38 INJECTION, POWDER, LYOPHILIZED, FOR SOLUTION INTRAVENOUS at 07:00

## 2024-12-27 RX ADMIN — REGADENOSON 0.4 MG: 0.08 INJECTION, SOLUTION INTRAVENOUS at 08:05

## 2024-12-30 LAB
BH CV IMMEDIATE POST RECOVERY TECH DATA SYMPTOMS: NORMAL
BH CV IMMEDIATE POST TECH DATA BLOOD PRESSURE: NORMAL MMHG
BH CV IMMEDIATE POST TECH DATA HEART RATE: 98 BPM
BH CV IMMEDIATE POST TECH DATA OXYGEN SATS: 98 %
BH CV REST NUCLEAR ISOTOPE DOSE: 9.2 MCI
BH CV SIX MINUTE RECOVERY TECH DATA BLOOD PRESSURE: NORMAL
BH CV SIX MINUTE RECOVERY TECH DATA HEART RATE: 83 BPM
BH CV SIX MINUTE RECOVERY TECH DATA OXYGEN SATURATION: 99 %
BH CV STRESS BP STAGE 1: NORMAL
BH CV STRESS COMMENTS STAGE 1: NORMAL
BH CV STRESS DOSE REGADENOSON STAGE 1: 0.4
BH CV STRESS DURATION MIN STAGE 1: 0
BH CV STRESS DURATION SEC STAGE 1: 10
BH CV STRESS HR STAGE 1: 71
BH CV STRESS NUCLEAR ISOTOPE DOSE: 36.3 MCI
BH CV STRESS O2 STAGE 1: 99
BH CV STRESS PROTOCOL 1: NORMAL
BH CV STRESS RECOVERY BP: NORMAL MMHG
BH CV STRESS RECOVERY HR: 83 BPM
BH CV STRESS RECOVERY O2: 99 %
BH CV STRESS STAGE 1: 1
BH CV THREE MINUTE POST TECH DATA BLOOD PRESSURE: NORMAL MMHG
BH CV THREE MINUTE POST TECH DATA HEART RATE: 87 BPM
BH CV THREE MINUTE POST TECH DATA OXYGEN SATURATION: 98 %
MAXIMAL PREDICTED HEART RATE: 145 BPM
PERCENT MAX PREDICTED HR: 67.59 %
SPECT HRT GATED+EF W RNC IV: 74 %
STRESS BASELINE BP: NORMAL MMHG
STRESS BASELINE HR: 67 BPM
STRESS O2 SAT REST: 98 %
STRESS PERCENT HR: 80 %
STRESS POST O2 SAT PEAK: 99 %
STRESS POST PEAK BP: NORMAL MMHG
STRESS POST PEAK HR: 98 BPM
STRESS TARGET HR: 123 BPM

## 2025-01-14 ENCOUNTER — OFFICE VISIT (OUTPATIENT)
Dept: INTERNAL MEDICINE | Facility: CLINIC | Age: 75
End: 2025-01-14
Payer: MEDICARE

## 2025-01-14 VITALS
TEMPERATURE: 95.5 F | DIASTOLIC BLOOD PRESSURE: 80 MMHG | SYSTOLIC BLOOD PRESSURE: 130 MMHG | HEIGHT: 65 IN | HEART RATE: 88 BPM | BODY MASS INDEX: 30.99 KG/M2 | RESPIRATION RATE: 14 BRPM | WEIGHT: 186 LBS | OXYGEN SATURATION: 98 %

## 2025-01-14 DIAGNOSIS — M25.532 LEFT WRIST PAIN: ICD-10-CM

## 2025-01-14 DIAGNOSIS — M25.512 ACUTE PAIN OF LEFT SHOULDER: ICD-10-CM

## 2025-01-14 DIAGNOSIS — Z91.81 HISTORY OF RECENT FALL: Primary | ICD-10-CM

## 2025-01-14 RX ORDER — DICLOFENAC SODIUM 75 MG/1
75 TABLET, DELAYED RELEASE ORAL 2 TIMES DAILY
Qty: 30 TABLET | Refills: 0 | Status: SHIPPED | OUTPATIENT
Start: 2025-01-14

## 2025-01-14 NOTE — PROGRESS NOTES
"Chief Complaint  Follow-up (Follow-up visit for urgent care )    Subjective        Yuliana Da Silva presents to [facility] for urgent care follow-up.  Patient reports that on 1/10/2025 she was shopping, when she left the store, due to ice and snow buildup, she had a fall when exiting the store.  She fell after her left foot slipped forward out from underneath of her, she fell to her left side, using her left hand to try to brace her fall.  She feels like when hitting the ground, her hand hit first, then her left hip, and left shoulder.  She had a bystander assisted her up, she was able to get in her car with her  without any extreme difficulty.  She did feel discomfort immediately following the fall.  They went home, she decided that the pain was significant enough that she should be seen, so she did go to the urgent care.  There, images were performed, she had an x-ray of her left shoulder, left hand.  There was suspicion for possible left wrist fracture, although indeterminate.  She was placed in a left wrist brace/immobilizer.  The x-ray of the left shoulder was unremarkable.  She continues to have pain in her left shoulder, she does have a sling that she has been using, just not at the very moment due to difficulty with getting a coat on.  She has some difficulty with range of motion.  She does use Tylenol, she avoids Motrin due to GI side effects.  She is on omeprazole.    Objective   Vital Signs:   /80 (BP Location: Left arm, Patient Position: Sitting, Cuff Size: Adult)   Pulse 88   Temp 95.5 °F (35.3 °C) (Temporal)   Resp 14   Ht 165.1 cm (65\")   Wt 84.4 kg (186 lb)   SpO2 98%   BMI 30.95 kg/m²     Physical Exam  Vitals and nursing note reviewed.   Constitutional:       Appearance: Normal appearance.   Pulmonary:      Effort: Pulmonary effort is normal.   Musculoskeletal:      Left shoulder: Tenderness present. No swelling. Decreased range of motion.      " Left wrist: Swelling and tenderness present.   Neurological:      Mental Status: She is alert.        Result Review :  {The following data was reviewed by ABI Elizondo on 01/14/25                Diagnoses and all orders for this visit:    1. History of recent fall (Primary)  -     Ambulatory Referral to Orthopedic Surgery  -     diclofenac (VOLTAREN) 75 MG EC tablet; Take 1 tablet by mouth 2 (Two) Times a Day.  Dispense: 30 tablet; Refill: 0    2. Left wrist pain  -     Ambulatory Referral to Orthopedic Surgery  -     diclofenac (VOLTAREN) 75 MG EC tablet; Take 1 tablet by mouth 2 (Two) Times a Day.  Dispense: 30 tablet; Refill: 0    3. Acute pain of left shoulder  -     Ambulatory Referral to Orthopedic Surgery  -     diclofenac (VOLTAREN) 75 MG EC tablet; Take 1 tablet by mouth 2 (Two) Times a Day.  Dispense: 30 tablet; Refill: 0    Reviewed the urgent care notes, we did review her x-rays today, the x-ray of her left wrist is indeterminate for possible tiny avulsion fracture, there is some findings consistent with degenerative changes.  Otherwise unremarkable left wrist.  Left shoulder is unremarkable outside of degenerative changes.  We discussed pain management, which we will allow her 2 weeks of diclofenac, she is on omeprazole, we did discuss risk, benefits, side effects.  Ultimately, I think patient would benefit from orthopedic referral to assess her left wrist, if fracture is present, she would likely need more substantial immobilization.  I am concerned about her left shoulder, she may have hyperextended, she may have caused some internal derangement of that shoulder.  I will defer any imaging at this point in time and let her follow-up with orthopedic team to see if imaging would be recommended versus more conservative measures initially.  She is able to follow-up with me as needed.      Follow Up   No follow-ups on file.  Patient was given instructions and counseling regarding her condition or for  health maintenance advice. Please see specific information pulled into the AVS if appropriate.     Justen De Jesus, APRN  1/14/2025  This note was electronically signed.

## 2025-02-03 ENCOUNTER — OFFICE VISIT (OUTPATIENT)
Dept: ORTHOPEDIC SURGERY | Facility: CLINIC | Age: 75
End: 2025-02-03
Payer: MEDICARE

## 2025-02-03 VITALS
OXYGEN SATURATION: 98 % | HEIGHT: 65 IN | HEART RATE: 95 BPM | BODY MASS INDEX: 30.99 KG/M2 | WEIGHT: 186 LBS | DIASTOLIC BLOOD PRESSURE: 79 MMHG | SYSTOLIC BLOOD PRESSURE: 137 MMHG

## 2025-02-03 DIAGNOSIS — S62.102A CLOSED FRACTURE OF LEFT WRIST, INITIAL ENCOUNTER: ICD-10-CM

## 2025-02-03 DIAGNOSIS — M25.532 LEFT WRIST PAIN: ICD-10-CM

## 2025-02-03 DIAGNOSIS — M25.512 LEFT SHOULDER PAIN, UNSPECIFIED CHRONICITY: Primary | ICD-10-CM

## 2025-02-03 NOTE — PROGRESS NOTES
"Chief Complaint  Initial Evaluation of the Left Shoulder and Initial Evaluation of the Left Wrist     Subjective      Yuliana Da Silva presents to [facility] for initial evaluation of the left shoulder and the left wrist.  She went to  on 1/10/25.  She fell on ice in front of a store today. Fell onto left hand and left arm. She is in a sling and a wrist brace. She has lupus.  She wears a brace and is in a sling.  She is taking care of her grand children that are 18 months, 3 and 5 after her 6 year old grandson was shot.  She is having increase use of the left side changes diapers, making bottles and .      Allergies   Allergen Reactions    Codeine Hives    Levofloxacin Hives, Diarrhea and Unknown - High Severity    Sulfamethoxazole-Trimethoprim Diarrhea and Unknown - High Severity    Tetracycline Hives and Unknown - High Severity    Tetracyclines & Related GI Intolerance    Sulfa Antibiotics GI Intolerance        Social History     Socioeconomic History    Marital status:    Tobacco Use    Smoking status: Never    Smokeless tobacco: Never    Tobacco comments:     Grew up exposed to second hand smoke   Vaping Use    Vaping status: Never Used   Substance and Sexual Activity    Alcohol use: Never    Drug use: Never    Sexual activity: Defer        I reviewed the patient's chief complaint, history of present illness, review of systems, past medical history, surgical history, family history, social history, medications, and allergy list.     Review of Systems     Constitutional: Denies fevers, chills, weight loss  Cardiovascular: Denies chest pain, shortness of breath  Skin: Denies rashes, acute skin changes  Neurologic: Denies headache, loss of consciousness      Vital Signs:   /79   Pulse 95   Ht 165.1 cm (65\")   Wt 84.4 kg (186 lb)   SpO2 98%   BMI 30.95 kg/m²          Physical Exam  General: Alert. No acute distress    Ortho Exam        LEFT SHOULDER " Mildly full ROM of the shoulder with pain at end ROM  Positive Cross body adduction. Supraspinatus strength 4+/5. Infraspinatus Strength 5/5. Infrared subscap 5/5.. Sensation intact to light touch, median, radial, ulnar nerve. Positive AIN, PIN, ulnar nerve motor. Positive pulses.  Impingement signs.  Tender to palpation to the shoulder and down the arm.      LEFT WRIST Mild swelling and bruising.   Sensation grossly intact to light touch, median, radial and ulnar nerve. Positive AIN, PIN and ulnar nerve motor function intact. Axillary nerve intact. Positive pulses.  Mildly Full , thumb opposition, MCP flexors, DIP flexors and PIP flexors.       Procedures        Imaging Results (Most Recent)       None             Result Review :         XR Shoulder 2+ View Left    Result Date: 1/10/2025  Narrative: XR SHOULDER 2+ VW LEFT Date of Exam: 1/10/2025 3:17 PM EST Indication: Trauma left shoulder after fall on ice trauma to left shoulder after fall on ice Comparison: 11/21/2022 Findings: The left glenohumeral and AC joints are intact. No fractures, dislocations or acute osseous abnormalities are identified. Mild degenerative changes are present.     Impression: Impression: Mild degenerative change. No acute osseous abnormalities are identified in the left shoulder. Electronically Signed: Jose Carlos West MD  1/10/2025 3:52 PM EST  Workstation ID: [id number]    XR Hand 3+ View Left    Result Date: 1/10/2025  Narrative: XR HAND 3+ VW LEFT Date of Exam: 1/10/2025 3:17 PM EST Indication: Injury to left thumb after fall on ice today Comparison: None available. Findings: There is a tiny ossification along the dorsal aspect of the wrist on the lateral view. Degenerative changes are present in the IP joints and first carpal metacarpal joint.     Impression: Impression: Tiny ossification along the dorsal aspect of the wrist on the lateral view is likely degenerative in nature. A tiny avulsion fracture is possible if the patient has  pain in this region. Electronically Signed: Jose Carlos West MD  1/10/2025 3:48 PM EST  Workstation ID: [id number]              Assessment and Plan     Diagnoses and all orders for this visit:    1. Left shoulder pain, unspecified chronicity (Primary)    2. Left wrist pain    3. Closed fracture of left wrist, initial encounter        Discussed the treatment plan with the patient. I reviewed the X-rays that were obtained 1/10/25 with the patient.     Come out of the sling.  Work on gentle ROM of the shoulder.     Wear wrist brace as needed for support and stability.      Will X ray the left wrist at the next visit.      Call or return if worsening symptoms.    Follow Up     4-6 weeks.  Assess if full ROM of the wrist and if therapy is needed.        Patient was given instructions and counseling regarding her condition or for health maintenance advice. Please see specific information pulled into the AVS if appropriate.     Scribed for Derrick Cano MD by Orin Parham MA.  02/03/25   14:37 EST      I have personally performed the services described in this document as scribed by the above individual and it is both accurate and complete. Derrick Cano MD 02/03/25

## 2025-02-13 ENCOUNTER — CLINICAL SUPPORT (OUTPATIENT)
Dept: INTERNAL MEDICINE | Facility: CLINIC | Age: 75
End: 2025-02-13
Payer: MEDICARE

## 2025-02-13 ENCOUNTER — TRANSCRIBE ORDERS (OUTPATIENT)
Dept: ADMINISTRATIVE | Facility: HOSPITAL | Age: 75
End: 2025-02-13
Payer: MEDICARE

## 2025-02-13 ENCOUNTER — LAB (OUTPATIENT)
Facility: HOSPITAL | Age: 75
End: 2025-02-13
Payer: MEDICARE

## 2025-02-13 VITALS — DIASTOLIC BLOOD PRESSURE: 72 MMHG | SYSTOLIC BLOOD PRESSURE: 132 MMHG

## 2025-02-13 DIAGNOSIS — E78.5 HYPERLIPIDEMIA, UNSPECIFIED HYPERLIPIDEMIA TYPE: Primary | ICD-10-CM

## 2025-02-13 DIAGNOSIS — I10 ESSENTIAL HYPERTENSION, MALIGNANT: ICD-10-CM

## 2025-02-13 DIAGNOSIS — I10 ESSENTIAL HYPERTENSION: Primary | ICD-10-CM

## 2025-02-13 DIAGNOSIS — E78.5 HYPERLIPIDEMIA, UNSPECIFIED HYPERLIPIDEMIA TYPE: ICD-10-CM

## 2025-02-13 DIAGNOSIS — E11.9 DIABETES MELLITUS WITHOUT COMPLICATION: ICD-10-CM

## 2025-02-13 LAB
ALBUMIN SERPL-MCNC: 4.5 G/DL (ref 3.5–5.2)
ALBUMIN/GLOB SERPL: 1.2 G/DL
ALP SERPL-CCNC: 71 U/L (ref 39–117)
ALT SERPL W P-5'-P-CCNC: 26 U/L (ref 1–33)
ANION GAP SERPL CALCULATED.3IONS-SCNC: 11.8 MMOL/L (ref 5–15)
AST SERPL-CCNC: 28 U/L (ref 1–32)
BILIRUB SERPL-MCNC: 0.4 MG/DL (ref 0–1.2)
BUN SERPL-MCNC: 14 MG/DL (ref 8–23)
BUN/CREAT SERPL: 16.3 (ref 7–25)
CALCIUM SPEC-SCNC: 9.7 MG/DL (ref 8.6–10.5)
CHLORIDE SERPL-SCNC: 103 MMOL/L (ref 98–107)
CHOLEST SERPL-MCNC: 188 MG/DL (ref 0–200)
CO2 SERPL-SCNC: 25.2 MMOL/L (ref 22–29)
CREAT SERPL-MCNC: 0.86 MG/DL (ref 0.57–1)
EGFRCR SERPLBLD CKD-EPI 2021: 70.6 ML/MIN/1.73
GLOBULIN UR ELPH-MCNC: 3.7 GM/DL
GLUCOSE SERPL-MCNC: 97 MG/DL (ref 65–99)
HBA1C MFR BLD: 6.3 % (ref 4.8–5.6)
HDLC SERPL-MCNC: 90 MG/DL (ref 40–60)
LDLC SERPL CALC-MCNC: 83 MG/DL (ref 0–100)
LDLC/HDLC SERPL: 0.9 {RATIO}
POTASSIUM SERPL-SCNC: 3.9 MMOL/L (ref 3.5–5.2)
PROT SERPL-MCNC: 8.2 G/DL (ref 6–8.5)
SODIUM SERPL-SCNC: 140 MMOL/L (ref 136–145)
TRIGL SERPL-MCNC: 84 MG/DL (ref 0–150)
VLDLC SERPL-MCNC: 15 MG/DL (ref 5–40)

## 2025-02-13 PROCEDURE — 80061 LIPID PANEL: CPT

## 2025-02-13 PROCEDURE — 36415 COLL VENOUS BLD VENIPUNCTURE: CPT

## 2025-02-13 PROCEDURE — 83036 HEMOGLOBIN GLYCOSYLATED A1C: CPT

## 2025-02-13 PROCEDURE — 80053 COMPREHEN METABOLIC PANEL: CPT

## 2025-02-13 NOTE — PROGRESS NOTES
"  Blood Pressure Check     Yuliana Da Silva, 1950, presents to the clinic for a blood pressure check    Reason for walk in check: Patient requested BP to be checked; had labs drawn in [place] for Dr. Paige this morning and was late taking her medication today.  Patient confirmed she is getting a new home BP machine; education provided to get one with arm cuff for accuracy.  Patient said her BP this morning was \"76/131.\"  Inquired if she possibly got the numbers mixed up and she said yes, maybe.        Current Outpatient Medications:     acetaminophen (TYLENOL) 500 MG tablet, Take 2 tablets by mouth 3 (Three) Times a Day As Needed (pain)., Disp: 50 tablet, Rfl: 0    albuterol (PROVENTIL) (2.5 MG/3ML) 0.083% nebulizer solution, , Disp: , Rfl:     amLODIPine (NORVASC) 5 MG tablet, Take 1 tablet by mouth Daily., Disp: , Rfl:     amoxicillin (AMOXIL) 875 MG tablet, Take 1 tablet by mouth 2 (Two) Times a Day for 10 days., Disp: 20 tablet, Rfl: 0    benzonatate (TESSALON) 100 MG capsule, Take 1 capsule by mouth 3 (Three) Times a Day As Needed for Cough for up to 10 days., Disp: 30 capsule, Rfl: 0    Blood Glucose Monitoring Suppl (FreeStyle Freedom Lite) w/Device kit, , Disp: , Rfl:     budesonide-formoterol (Symbicort) 160-4.5 MCG/ACT inhaler, Inhale 2 puffs As Needed (Cough)., Disp: 1 each, Rfl: 12    cetirizine (zyrTEC) 10 MG tablet, Take 1 tablet by mouth Daily., Disp: 90 tablet, Rfl: 1    Cholecalciferol (Vitamin D) 50 MCG (2000 UT) tablet, Take 1 tablet by mouth Daily. Vitamin D3, Disp: , Rfl:     cycloSPORINE (RESTASIS) 0.05 % ophthalmic emulsion, , Disp: , Rfl:     diclofenac (VOLTAREN) 75 MG EC tablet, Take 1 tablet by mouth 2 (Two) Times a Day., Disp: 30 tablet, Rfl: 0    fexofenadine (ALLEGRA) 60 MG tablet, , Disp: , Rfl:     Flovent  MCG/ACT inhaler, Inhale 2 puffs 2 (Two) Times a Day., Disp: , Rfl:     fluticasone (FLONASE) 50 MCG/ACT nasal spray, Administer 2 sprays into the nostril(s) " as directed by provider 2 (Two) Times a Day., Disp: , Rfl:     guaifenesin-dextromethorphan 600-30 mg (MUCINEX DM)  MG tablet sustained-release 12 hour, Take 2 tablets by mouth 2 (Two) Times a Day As Needed (chest congestion) for up to 10 days., Disp: 20 tablet, Rfl: 0    hydroCHLOROthiazide 25 MG tablet, , Disp: , Rfl:     hydroxychloroquine (Plaquenil) 200 MG tablet, Take 1 tablet by mouth Daily., Disp: , Rfl:     hyoscyamine (ANASPAZ,LEVSIN) 0.125 MG tablet, Every 4 (Four) Hours., Disp: , Rfl:     Lancets (freestyle) lancets, , Disp: , Rfl:     Lubricating Plus Eye Drops 0.5 % solution, Administer 1 drop to both eyes Daily., Disp: , Rfl:     metoprolol succinate XL (TOPROL-XL) 25 MG 24 hr tablet, Take 1 tablet by mouth Daily., Disp: , Rfl:     mometasone (NASONEX) 50 MCG/ACT nasal spray, Administer 2 sprays into the nostril(s) as directed by provider As Needed., Disp: , Rfl:     omeprazole OTC (PrilOSEC OTC) 20 MG EC tablet, Take 1 tablet by mouth Daily., Disp: , Rfl:     pravastatin (PRAVACHOL) 20 MG tablet, , Disp: , Rfl:     ProAir  (90 Base) MCG/ACT inhaler, Inhale 2 puffs As Needed., Disp: , Rfl:     Spiriva Respimat 1.25 MCG/ACT aerosol solution inhaler, , Disp: , Rfl:       Blood Pressure Reading Today: Left arm; 132/72; sitting; manual     Symptoms: None    Previous Readings:   BP Readings from Last 3 Encounters:   02/13/25 132/72   02/04/25 117/69   02/03/25 137/79       Provider Consulted: Dr. Betts    Follow-Up Plan: RTC for PRN BP check as needed.  Continue taking medication as ordered per provider.  Offered for patient to schedule an appointment with provider in the next day or so as patient indicated a traumatic death recently in the family.  Patient confirmed she and her  now have temporary custody of 3 x grand-children and were trying to grieve the loss of the fourth.  She confirmed that it would be difficult for her to make an appointment right now as someone has to be with  the children at all times and they have ongoing custody / court issues to resolve.  Gave patient her BP reading for today and offered further assistance as needed.  Updated provider.       Wanda Bloom RN  02/13/25, 15:19 EST

## 2025-03-02 NOTE — PROGRESS NOTES
"Chief Complaint  Pain and Follow-up of the Left Shoulder and Pain and Follow-up of the Left Wrist     Subjective      Yuliana Da Silva is a 74 y.o. female who presents to [facility] for follow up on left shoulder and wrist. Patient had a fall on ice in front of store and went to  on 1/10/2025 for x rays. Shoulder x rays showed mild degenerative changes. Wrist x rays showed tiny avulsion fracture on dorsal aspect of wrist on lateral view. She had initial eval on 2/3/2025 with recommendation to come out of sling for gentle shoulder ROM and wear wrist brace as needed for support/stability. Of note, she cares for her grandchildren ages 18 mo, 3 and 5 year old after her 7 y/o grandson was shot. Therefore, she reported having to use left side for diaper changes, bottles, , ect.    She is here today for follow up and consider PT. does feel that she would benefit from PT today.  She states the wrist is \"giving me fits\", but the shoulder is causing more pain.  She states that she bumps the wrist a lot when doing activities such as laundry and is about to go buy a brace.  She cannot reach the back of her head or the upper back to do her hair or clip her bra.  He reports pain along the anterior and on the lateral aspect of the shoulder with any forward or overhead reaching.  She states that she has been eating extra strength Tylenol like candy due to the discomfort.  She continues to care for her grandchildren as noted above.  She has tried to avoid any heavy lifting but at times it is difficult and she does it without realizing it until she has recurrent pain.    Allergies   Allergen Reactions    Codeine Hives    Levofloxacin Hives, Diarrhea and Unknown - High Severity    Sulfamethoxazole-Trimethoprim Diarrhea and Unknown - High Severity    Tetracycline Hives and Unknown - High Severity    Tetracyclines & Related GI Intolerance    Sulfa Antibiotics GI Intolerance        Social " "History     Socioeconomic History    Marital status:    Tobacco Use    Smoking status: Never    Smokeless tobacco: Never    Tobacco comments:     Grew up exposed to second hand smoke   Vaping Use    Vaping status: Never Used   Substance and Sexual Activity    Alcohol use: Never    Drug use: Never    Sexual activity: Defer        Tobacco Use: Low Risk  (3/3/2025)    Patient History     Smoking Tobacco Use: Never     Smokeless Tobacco Use: Never     Passive Exposure: Not on file     Patient reports that they are a nonsmoker; cessation education not applicable.     I reviewed the patient's chief complaint, history of present illness, review of systems, past medical history, surgical history, family history, social history, medications, and allergy list.     Review of Systems     Constitutional: Denies fevers, chills, weight loss  Cardiovascular: Denies chest pain, shortness of breath  Skin: Denies rashes, acute skin changes  Neurologic: Denies headache, loss of consciousness    Vital Signs:   /76   Pulse 80   Ht 165.1 cm (65\")   Wt 83.9 kg (184 lb 15.5 oz)   SpO2 99%   BMI 30.78 kg/m²          Physical Exam  General: Alert. No acute distress    Ortho Exam      Left upper extremity:  Patient demonstrates active flexion 130 degrees. Abduction 120 degrees. External rotation 60 degrees. IR to lower lumbar.  Demonstrates intact active elbow flexion and extension.  No gross bony abnormality of wrist on inspection or palpation of wrist.  Nontender over the distal radius, distal ulna, hand and fingers.  Most of her tenderness is along the base of the thumb.  Mild swelling of wrist and/or digits.  Wrist flexion and extension mild to moderately limited.  100% pronation and supination.  Demonstrates active finger flexion and extension without pain except with the thumb.  Thumb to finger opposition intact with discomfort at the thumb.  Patient is able to make a fist. Less than 2-second capillary refill in " fingertips.  Palpable radial pulse.  Sensation intact in median, radial, ulnar nerve distributions.  Intact motor function.     Imaging Results (Most Recent)       Procedure Component Value Units Date/Time    XR Wrist 2 View Left [[id number]] Resulted: 03/03/25 1620     Updated: 03/03/25 1620    Narrative:      X-Ray Report:  Study: X-rays ordered, taken in the office, and reviewed today  Indication: Left wrist fracture/wrist pain  View: AP/Lateral view(s)  Findings: Degenerative changes noted at IP joints and first   carpometacarpal joint but previously noted tiny ossification along dorsal   aspect of the wrist on lateral view no longer present.  Prior studies available for comparison: yes               Result Review :       XR Wrist 2 View Left    Result Date: 3/3/2025  Narrative: X-Ray Report: Study: X-rays ordered, taken in the office, and reviewed today Indication: Left wrist fracture/wrist pain View: AP/Lateral view(s) Findings: Degenerative changes noted at IP joints and first carpometacarpal joint but previously noted tiny ossification along dorsal aspect of the wrist on lateral view no longer present. Prior studies available for comparison: yes             Assessment and Plan     Diagnoses and all orders for this visit:    1. Closed fracture of left wrist, initial encounter (Primary)  -     Ambulatory Referral to Physical Therapy for Evaluation & Treatment    2. Left wrist pain  -     XR Wrist 2 View Left  -     Ambulatory Referral to Physical Therapy for Evaluation & Treatment    3. Left shoulder pain, unspecified chronicity  -     Ambulatory Referral to Physical Therapy for Evaluation & Treatment        X-rays obtained of left wrist and reviewed with patient.      Wrist brace provided today with discussion of not wearing it too much so that she does not become reliant on it and/or become more stiff from the brace.    Continue gentle wrist and shoulder range of motion's.  Physical therapy order placed today  for her to begin PT.    We did discuss shoulder injection versus MRI.  Patient states that she would like to try physical therapy purse.  She is not at a point to where she could be compliant with recovery time if surgical intervention was pursued as she is still watching the grandchildren.     Continue with ice and elevation as needed for pain/swelling.    Try to avoid any heavy lifting over 5 pounds with the left upper extremity until progressed beyond that with PT.    Follow up 4-6 weeks with repeat x-rays.   Call or return if worsening symptoms.      Follow Up   There are no Patient Instructions on file for this visit.        Patient was given instructions and counseling regarding her condition or for health maintenance advice. Please see specific information pulled into the AVS if appropriate.     Raeann Mosley PA-C   03/03/2025  14:54 EST    Dictated Utilizing Dragon Dictation. Please note that portions of this note were completed with a voice recognition program. Part of this note may be an electronic transcription/translation of spoken language to printed text using the Dragon Dictation System.

## 2025-03-03 ENCOUNTER — OFFICE VISIT (OUTPATIENT)
Dept: ORTHOPEDIC SURGERY | Facility: CLINIC | Age: 75
End: 2025-03-03
Payer: MEDICARE

## 2025-03-03 VITALS
SYSTOLIC BLOOD PRESSURE: 131 MMHG | OXYGEN SATURATION: 99 % | DIASTOLIC BLOOD PRESSURE: 76 MMHG | HEIGHT: 65 IN | WEIGHT: 184.97 LBS | BODY MASS INDEX: 30.82 KG/M2 | HEART RATE: 80 BPM

## 2025-03-03 DIAGNOSIS — M25.512 LEFT SHOULDER PAIN, UNSPECIFIED CHRONICITY: ICD-10-CM

## 2025-03-03 DIAGNOSIS — M25.532 LEFT WRIST PAIN: ICD-10-CM

## 2025-03-03 DIAGNOSIS — S62.102A CLOSED FRACTURE OF LEFT WRIST, INITIAL ENCOUNTER: Primary | ICD-10-CM

## 2025-03-03 PROCEDURE — 1159F MED LIST DOCD IN RCRD: CPT | Performed by: PHYSICIAN ASSISTANT

## 2025-03-03 PROCEDURE — 3075F SYST BP GE 130 - 139MM HG: CPT | Performed by: PHYSICIAN ASSISTANT

## 2025-03-03 PROCEDURE — 3078F DIAST BP <80 MM HG: CPT | Performed by: PHYSICIAN ASSISTANT

## 2025-03-03 PROCEDURE — 1160F RVW MEDS BY RX/DR IN RCRD: CPT | Performed by: PHYSICIAN ASSISTANT

## 2025-03-03 PROCEDURE — 99213 OFFICE O/P EST LOW 20 MIN: CPT | Performed by: PHYSICIAN ASSISTANT

## 2025-03-04 DIAGNOSIS — S62.102A CLOSED FRACTURE OF LEFT WRIST, INITIAL ENCOUNTER: Primary | ICD-10-CM

## 2025-03-12 ENCOUNTER — TREATMENT (OUTPATIENT)
Dept: PHYSICAL THERAPY | Facility: CLINIC | Age: 75
End: 2025-03-12
Payer: MEDICARE

## 2025-03-12 DIAGNOSIS — M25.532 LEFT WRIST PAIN: Primary | ICD-10-CM

## 2025-03-12 DIAGNOSIS — M25.612 DECREASED ROM OF LEFT SHOULDER: ICD-10-CM

## 2025-03-12 DIAGNOSIS — M25.512 ACUTE PAIN OF LEFT SHOULDER: ICD-10-CM

## 2025-03-12 NOTE — PROGRESS NOTES
Occupational Therapy Initial Evaluation and Plan of Care  Zachary  OT: [address] [place] [address]    Patient: Yuliana Da Silva   : 1950  Diagnosis/ICD-10 Code:  Left wrist pain [M25.532]  Referring practitioner: XIOMY Sadler  Date of Initial Visit: 3/12/2025  Today's Date: 3/12/2025  Patient seen for 1 sessions           Subjective Questionnaire: QuickDASH: 38/55      Subjective Evaluation    History of Present Illness  Mechanism of injury: Pt is a RHD 75 y/o female who presents to therapy with c/o L wrist pain, numbness and tingling in thenar eminence, L shoulder pain and decreased ROM. Pt reports in January she slipped and fell. She reports she attempted to catch herself with her left arm. Pt reports pain to the lateral L shoulder and at the base of the thumb. Xrays of the shoulder revealed: Mild degenerative change. No acute osseous abnormalities are identified in the left shoulder. Initial Xrays of the wrist revealed: Tiny ossification along the dorsal aspect of the wrist on the lateral view is likely degenerative in nature. A tiny avulsion fracture is possible if the patient has pain in this region. The follow up Xray of the wrist revealed:  Degenerative changes noted at IP joints and first carpometacarpal joint but previously noted tiny ossification along dorsal aspect of the wrist on lateral view no longer present. Pt wears a wrist brace during the day and reports this helps. Pt is retired.  PMHx: arthritis, asthma, lupus, fibromyalgia, prediabetes.      Pain  Current pain ratin (3/10 L wrist)  At best pain ratin (1/10 L wrist)  At worst pain ratin (6/10 L wrist)  Location: L shoulder  Quality: sharp (ache, throbbing L wrist)  Relieving factors: medications and heat (wrist brace)  Aggravating factors: sleeping, lifting, movement and overhead activity  Progression: improved    Social Support  Lives with: spouse (3 great grandchildren and granddaughter)    Hand  "dominance: right    Diagnostic Tests  X-ray: abnormal    Treatments  No previous or current treatments  Patient Goals  Patient goals for therapy: decreased pain, increased motion, increased strength, independence with ADLs/IADLs and return to sport/leisure activities  Patient goal: \"To get better as I was before I fell. To be able to do the things I did like putting on my necklace and go fishing\"           Objective          Tenderness     Left Shoulder   Tenderness in the AC joint, biceps tendon (proximal), clavicle, infraspinatus tendon and supraspinatus tendon. No tenderness in the lateral scapula, medial scapula, scapular spine and SC joint.     Left Wrist/Hand   Tenderness in the carpometacarpal joint.     Additional Tenderness Details  Moderate  Thenar eminence - moderate    Cervical/Thoracic Screen   Cervical range of motion within normal limits with the following exceptions: Limited R lateral flexion due to tightness in L side of neck.    Neurological Testing     Additional Neurological Details  Pt scored WNL on monofilament testing but reports numbness/tingling in thenar eminence of L hand.    Active Range of Motion   Left Shoulder   Flexion: 115 degrees with pain  Abduction: 125 degrees with pain  External rotation BTH: C6 with pain  Internal rotation BTB: lumbar with pain    Left Elbow   Normal active range of motion    Left Wrist   Normal active range of motion  Radial deviation: with pain    Left Thumb   Opposition: Pt able to oppose to small finger.    Additional Active Range of Motion Details  Radial and palmar abd are equal to R thumb. Pt able to make composite fist. CMC, MP, and IP flx are equal to R thumb but with mild pain reported.    Passive Range of Motion   Left Shoulder   Flexion: 105 degrees with pain  Abduction: 90 degrees with pain  External rotation 0°: 15 degrees with pain    Strength/Myotome Testing     Right Wrist/Hand      (2nd hand position)     Trial 1: 42 lbs    Trial 2: 45 " lbs    Trial 3: 52 lbs    Average: 46.33 lbs    Additional Strength Details  Deferred secondary to pain.  Strength testing in L deferred secondary to pain.    Tests     Left Shoulder   Positive belly press, empty can, Hawkin's and Speed's.           Assessment & Plan       Assessment  Impairments: abnormal or restricted ROM, activity intolerance, impaired physical strength, lacks appropriate home exercise program and pain with function   Functional limitations: carrying objects, lifting, sleeping, pulling, pushing, uncomfortable because of pain, moving in bed, reaching behind back and reaching overhead   Assessment details: Pt will benefit from skilled OT secondary to decreased strength/ROM and increased pain that limits pt ability to complete ADLs.  Prognosis: good    Goals  Plan Goals: SHOULDER  PROBLEMS:     1. The patient has limited ROM of the L shoulder.    LTG 1: 12 weeks:  The patient will demonstrate 140 degrees of active shoulder flexion, 140 degrees of shoulder abduction, external rotation to behind head, and internal rotation to lumbar spine to allow the patient to reach into upper kitchen cabinets and manipulate clothing behind the back with greater ease.    STATUS:  New   STG 1a: 8 weeks:  The patient will demonstrate 120 degrees of active shoulder flexion.    STATUS:  New   TREATMENT: Manual therapy, therapeutic exercise, home exercise instruction, and modalities as needed to include: electrical stimulation, ultrasound, moist heat, and ice.    2. The patient has limited strength of the L shoulder.   LTG 2: 12 weeks:  The patient will demonstrate 5 /5 strength for L shoulder flexion, abduction, external rotation, and internal rotation in order to demonstrate improved shoulder stability.    STATUS:  New   STG 2a: 8 weeks:  The patient will demonstrate ability to tolerate MMT for L shoulder flexion, abduction, external rotation, and internal rotation.    STATUS:  New   STG2b:  8 weeks:  The patient will  be independent with home exercises.     STATUS:  New   TREATMENT: Manual therapy, therapeutic exercise, home exercise instruction, and modalities as needed to include: electrical stimulation, ultrasound, moist heat, and ice.     3. The patient complains of pain to the L shoulder and wrist.   LTG 3: 12 weeks:  The patient will report a pain rating of 1 /10 or better in order to improve sleep quality and tolerance to performance of activities of daily living.    STATUS:  New   STG 3a: 8 weeks:  The patient will report a pain rating of 3 /10 or better.     STATUS:  New   TREATMENT: Manual therapy, therapeutic exercise, home exercise instruction, and modalities as needed to include: electrical stimulation, ultrasound, moist heat, and ice.    4. Carrying, Moving, and Handling Objects Functional Limitation     LTG 4: 12 weeks:  The patient will demonstrate 1-19 % limitation by achieving a score of 19 on the QuickDASH.    STATUS:  New   STG 4a: 8 weeks:  The patient will demonstrate 20-39 % limitation by achieving a score of 27 on the QuickDASH.      STATUS:  New   TREATMENT:  Manual therapy, therapeutic exercise, home exercise instruction, and modalities as needed to include: moist heat, electrical stimulation, and ultrasound.     5.  The patient has limited strength of the L wrist.   LTG 5  12 weeks:  The patient will demonstrate 5/5 MMT of L wrist and 40 lbs of  strength in order to grasp and manipulate objects.    Status: New   STG 5  8 weeks:  The patient will demonstrate ability to tolerate  strength testing and MMT of L hand/wrist.    Status:  New      Plan  Planned modality interventions: cryotherapy and thermotherapy (hydrocollator packs)  Planned therapy interventions: body mechanics training, fine motor coordination training, flexibility, functional ROM exercises, home exercise program, joint mobilization, manual therapy, neuromuscular re-education, postural training, soft tissue mobilization,  strengthening, stretching and therapeutic activities  Frequency: 2x week  Duration in weeks: 12  Treatment plan discussed with: patient      Pt is indicated for skilled occupational therapy services.  Timed:           Therapeutic Exercise:    8     mins  [id number];       Un-Timed:  Low Eval     50     Mins  25771    Timed Treatment:   8   mins   Total Treatment:     58   mins    Start time: 1032  End time: 1130    OT SIGNATURE: Alan Toro OT   DATE TREATMENT INITIATED: 3/12/2025  [place] License: [id number]    Initial Certification  Certification Period: 6/9/2025  I certify that the therapy services are furnished while this patient is under my care.  The services outlined above are required by this patient, and will be reviewed every 90 days.     PHYSICIAN: Raeann Mosley PA-C      DATE:   MD NPI: [id number]  Please sign and return via fax to   [phone].. Thank you, [facility].

## 2025-03-17 ENCOUNTER — TREATMENT (OUTPATIENT)
Dept: PHYSICAL THERAPY | Facility: CLINIC | Age: 75
End: 2025-03-17
Payer: MEDICARE

## 2025-03-17 DIAGNOSIS — M25.532 LEFT WRIST PAIN: Primary | ICD-10-CM

## 2025-03-17 DIAGNOSIS — M25.512 ACUTE PAIN OF LEFT SHOULDER: ICD-10-CM

## 2025-03-17 DIAGNOSIS — M25.612 DECREASED ROM OF LEFT SHOULDER: ICD-10-CM

## 2025-03-17 NOTE — PROGRESS NOTES
Occupational Therapy Daily Treatment Note  Zachary OT: [address] [place] [address]      Patient: Yuliana Da Silva   : 1950  Diagnosis/ICD-10 Code:  Left wrist pain [M25.532]  Referring practitioner: XIOMY Sadler  Date of Initial Visit: Type: THERAPY  Noted: 3/12/2025  Today's Date: 3/17/2025  Patient seen for 2 sessions           Subjective   Yuliana Da Silva reports: 6/10 L shoulder and wrist pain. Pt reports stretching pain in posterior forearm with thumb opposition exercise.    Objective     See Exercise, Manual, and Modality Logs for complete treatment.       Assessment/Plan  Pt reports pain with opposition to ring finger. OT encouraged pt to complete only in comfortable range. Pt reports improved tolerance at midrange. Pt reports pain in wrist with wall ladder activity (ended). Pt reports pain in shoulder with no monies (ended). Good tolerance to remainder of treatment. Pt continues with decreased fxl strength/ROM and increased pain that limits ability to complete ADLs/IADLs.  Progress per Plan of Care           Timed:  Therapeutic Exercise:    15     mins  13813;      Therapeutic Activity:     23     mins  49443;       Timed Treatment:   38   mins   Total Treatment:     38   mins    Start time: 1529  End time: 1607    Alan Toro OT  Occupational Therapist  [place] License: [id number]  NPI: [id number]

## 2025-03-24 ENCOUNTER — TREATMENT (OUTPATIENT)
Dept: PHYSICAL THERAPY | Facility: CLINIC | Age: 75
End: 2025-03-24
Payer: MEDICARE

## 2025-03-24 DIAGNOSIS — M25.532 LEFT WRIST PAIN: Primary | ICD-10-CM

## 2025-03-24 DIAGNOSIS — M25.512 ACUTE PAIN OF LEFT SHOULDER: ICD-10-CM

## 2025-03-24 DIAGNOSIS — M25.612 DECREASED ROM OF LEFT SHOULDER: ICD-10-CM

## 2025-03-24 PROCEDURE — 97530 THERAPEUTIC ACTIVITIES: CPT | Performed by: OCCUPATIONAL THERAPIST

## 2025-03-24 PROCEDURE — 97110 THERAPEUTIC EXERCISES: CPT | Performed by: OCCUPATIONAL THERAPIST

## 2025-03-24 NOTE — PROGRESS NOTES
Occupational Therapy Daily Treatment Note  Zachary OT: [address] [place] [address]      Patient: Yuliana Da Sliva   : 1950  Diagnosis/ICD-10 Code:  Left wrist pain [M25.532]  Referring practitioner: XIOMY Sadler  Date of Initial Visit: Type: THERAPY  Noted: 3/12/2025  Today's Date: 3/24/2025  Patient seen for 3 sessions           Subjective   Yuliana Da Silva reports: The shoulder is doing better but reports her wrist has been throbbing all night. /10 pain L shoulder. 7/10 pain L wrist.    Objective     See Exercise, Manual, and Modality Logs for complete treatment.       Assessment/Plan  OT graded up duration on UBE and reps with no monies. OT provided scap squeeze and no monies HEP. Teachback successful. Fair tolerance to treatment. Pt continues with decreased fxl strength/ROM and increased pain that limits ability to complete ADLs/IADLs.  Progress per Plan of Care           Timed:  Therapeutic Exercise:    23     mins  04023;      Therapeutic Activity:     15     mins  67853;       Timed Treatment:   38   mins   Total Treatment:     38   mins    Start time: 1052  End time: 1130    Alan Toro OT  Occupational Therapist  [place] License: [id number]  NPI: [id number]

## 2025-03-26 ENCOUNTER — TREATMENT (OUTPATIENT)
Dept: PHYSICAL THERAPY | Facility: CLINIC | Age: 75
End: 2025-03-26
Payer: MEDICARE

## 2025-03-26 DIAGNOSIS — M25.512 ACUTE PAIN OF LEFT SHOULDER: ICD-10-CM

## 2025-03-26 DIAGNOSIS — M25.532 LEFT WRIST PAIN: Primary | ICD-10-CM

## 2025-03-26 DIAGNOSIS — M25.612 DECREASED ROM OF LEFT SHOULDER: ICD-10-CM

## 2025-03-26 NOTE — PROGRESS NOTES
Occupational Therapy Daily Treatment Note  Zachary OT: [address] [place] [address]      Patient: Yuliana Da Silva   : 1950  Diagnosis/ICD-10 Code:  Left wrist pain [M25.532]  Referring practitioner: XIOMY Sadler  Date of Initial Visit: Type: THERAPY  Noted: 3/12/2025  Today's Date: 3/26/2025  Patient seen for 4 sessions           Subjective   Yuliana Da Silva reports: The shoulder is loosening up some. 5/10 pain L shoulder. 6/10 pain L wrist.    Objective     See Exercise, Manual, and Modality Logs for complete treatment.       Assessment/Plan  OT graded up duration on UBE. Pt reports pushing to far with semi supine AA flexion causing pain in shoulder. OT again encouraged pt to not push into pain. Pt verbalizes understanding. Pt decreased AAROM in flexion and reports improved tolerance. Fair tolerance to hand/thumb exercises. Good tolerance to remainder of treatment. Pt continues with decreased fxl strength/ROM and increased pain that limits ability to complete ADLs/IADLs.  Progress per Plan of Care           Timed:  Therapeutic Exercise:    30     mins  21062;      Therapeutic Activity:     10     mins  66360;       Timed Treatment:   40   mins   Total Treatment:     40   mins    Start time: 1100  End time: 1140    Alan Toro OT  Occupational Therapist  [place] License: [id number]  NPI: [id number]

## 2025-03-27 ENCOUNTER — TELEPHONE (OUTPATIENT)
Dept: ORTHOPEDIC SURGERY | Facility: CLINIC | Age: 75
End: 2025-03-27

## 2025-03-27 DIAGNOSIS — M25.512 LEFT SHOULDER PAIN, UNSPECIFIED CHRONICITY: Primary | ICD-10-CM

## 2025-03-27 DIAGNOSIS — S49.92XA INJURY OF LEFT SHOULDER, INITIAL ENCOUNTER: ICD-10-CM

## 2025-03-27 NOTE — TELEPHONE ENCOUNTER
Caller: Yuliana Da Silva    Relationship: Self    Best call back number: [phone]    What orders are you requesting (i.e. lab or imaging): LEFT SHOULDER MRI    In what timeframe would the patient need to come in: ASAP    Where will you receive your lab/imaging services: OPEN MRI ON [address]

## 2025-03-28 ENCOUNTER — TELEPHONE (OUTPATIENT)
Dept: ORTHOPEDIC SURGERY | Facility: CLINIC | Age: 75
End: 2025-03-28
Payer: MEDICARE

## 2025-03-28 NOTE — TELEPHONE ENCOUNTER
LVM FOR PT THAT MRI ORDER HAS BEEN FAXED TO [facility] - [facility] DOES NOT HAVE A TRUE OPEN MRI BUT A WIDEBOARD MACHINE  WILL HAVE TO BE SENT TO [place] OR [place] IF PATIENT CAN ONLY HAVE MRI IN TRUE OPEN MACHINE .PLEASE CALL US TO SCHEDULE FOLLOW UP ONCE APPOINTMENT IS SCHEDULED .

## 2025-03-31 ENCOUNTER — TREATMENT (OUTPATIENT)
Dept: PHYSICAL THERAPY | Facility: CLINIC | Age: 75
End: 2025-03-31
Payer: MEDICARE

## 2025-03-31 DIAGNOSIS — M25.532 LEFT WRIST PAIN: Primary | ICD-10-CM

## 2025-03-31 DIAGNOSIS — M25.512 ACUTE PAIN OF LEFT SHOULDER: ICD-10-CM

## 2025-03-31 DIAGNOSIS — M25.612 DECREASED ROM OF LEFT SHOULDER: ICD-10-CM

## 2025-03-31 PROCEDURE — 97110 THERAPEUTIC EXERCISES: CPT | Performed by: OCCUPATIONAL THERAPIST

## 2025-03-31 PROCEDURE — 97530 THERAPEUTIC ACTIVITIES: CPT | Performed by: OCCUPATIONAL THERAPIST

## 2025-03-31 NOTE — PROGRESS NOTES
Occupational Therapy Daily Treatment Note  Zachary OT: [address] [place] [address]      Patient: Yuliana Da Silva   : 1950  Diagnosis/ICD-10 Code:  Left wrist pain [M25.532]  Referring practitioner: XIOMY Sadler  Date of Initial Visit: Type: THERAPY  Noted: 3/12/2025  Today's Date: 3/31/2025  Patient seen for 5 sessions           Subjective   Yuliana Da Silva reports: Her shoulder woke her up last night. Pt reports throbbing. 6/10 pain L shoulder. No current pain in wrist.    Objective     See Exercise, Manual, and Modality Logs for complete treatment.       Assessment/Plan  Fair tolerance to treatment. Pt to undergo MRI and will call about future appts. Pt continues with decreased fxl strength/ROM and increased pain that limits ability to complete ADLs/IADLs.  Progress per Plan of Care           Timed:  Therapeutic Exercise:    15     mins  25357;     Therapeutic Activity:     23     mins  49462;       Timed Treatment:   38   mins   Total Treatment:     39   mins    Start time: 1100  End time: 1139    Alan Toro OT  Occupational Therapist  [place] License: [id number]  NPI: [id number]

## 2025-04-05 NOTE — PROGRESS NOTES
"Chief Complaint  Follow-up of the Left Wrist     Subjective      Yuliana Da Silva is a 74 y.o. female who presents to [facility] for follow up on left shoulder and wrist. Patient had a fall on ice and went to  on 1/10/2025 for x rays. Shoulder x rays showed mild degenerative changes. Wrist x rays showed tiny avulsion fracture on dorsal aspect of wrist on lateral view. She had initial eval on 2/3/2025 with recommendation to come out of sling for gentle shoulder ROM and wear wrist brace as needed for support/stability. Of note, she cares for her grandchildren ages 18 mo, 3 and 5 year old after her 5 y/o grandson was shot. Therefore, she reported having to use left side for diaper changes, bottles, , ect.    At last visit reported the wrist was \"giving me fits\", but shoulder causing more pain. Inquired about wrist brace. Reported taking \"Tylenol XS like candy\". She has tried to avoid any heavy lifting but at times it is difficult and she does it without realizing it until she has recurrent pain. Patient was given a wrist brace and rx PT, deferred MRI as she could not be compliant with postop expectations while caring for her young grandchildren.    History of Present Illness  The patient presents for evaluation of shoulder pain and wrist, but complains of thumb locking.    She continues to experience significant pain in her shoulder, which was previously identified as the primary source of discomfort. She has been undergoing physical therapy for both her hand and shoulder. While therapy has improved her shoulder mobility, it has not alleviated her pain. An MRI of her shoulder is scheduled for tomorrow at 1:00 PM. She is seeking a mild sedative to manage her claustrophobia during the procedure.    She reports persistent issues with her hand, specifically her thumb, which she is unable to fully control. Certain movements, such as dishwashing, trigger pain in her hand. " "Frequent use of her hand results in her thumb locking in place, requiring manual manipulation to release. She expresses surprise at the lack of improvement in her condition. She suspects the onset of arthritis, given her history of lupus. She is currently caring for her grandchildren as noted above, which necessitates frequent lifting of 2 year old. This activity exacerbates her symptoms. She is reluctant to consider steroid injections due to her prolonged use of steroids for lupus management.      Allergies   Allergen Reactions    Codeine Hives    Levofloxacin Hives, Diarrhea and Unknown - High Severity    Sulfamethoxazole-Trimethoprim Diarrhea and Unknown - High Severity    Tetracycline Hives and Unknown - High Severity    Tetracyclines & Related GI Intolerance    Sulfa Antibiotics GI Intolerance        Social History     Socioeconomic History    Marital status:    Tobacco Use    Smoking status: Never    Smokeless tobacco: Never    Tobacco comments:     Grew up exposed to second hand smoke   Vaping Use    Vaping status: Never Used   Substance and Sexual Activity    Alcohol use: Never    Drug use: Never    Sexual activity: Defer        Tobacco Use: Low Risk  (4/7/2025)    Patient History     Smoking Tobacco Use: Never     Smokeless Tobacco Use: Never     Passive Exposure: Not on file     Patient reports that they are a nonsmoker; cessation education not applicable.     I reviewed the patient's chief complaint, history of present illness, review of systems, past medical history, surgical history, family history, social history, medications, and allergy list.     Review of Systems     Constitutional: Denies fevers, chills, weight loss  Cardiovascular: Denies chest pain, shortness of breath  Skin: Denies rashes, acute skin changes  Neurologic: Denies headache, loss of consciousness    Vital Signs:   /77   Pulse 91   Ht 165.1 cm (65\")   Wt 83.5 kg (184 lb)   SpO2 97%   BMI 30.62 kg/m²      "     Physical Exam  General: Alert. No acute distress    Ortho Exam    Left upper extremity:  Patient demonstrates active flexion 150 degrees. Abduction 140 degrees. External rotation 50 degrees. IR to lower lumbar.  Demonstrates intact active elbow flexion and extension.      Left wrist: No gross bony abnormality of wrist on inspection or palpation. Nontender over the distal radius, distal ulna, hand and fingers.  No swelling of wrist and/or digits.  Wrist flexion and extension approximately 100% compared to the Entre lateral wrist.  Full pronation and supination.  Demonstrates active finger flexion and extension with pain at thumb base. Thumb tender to palpation.  No active triggering.  He is able to make a fist.  Less than 2-second capillary refill in fingertips.  Palpable radial pulse.  Sensation intact in median, radial, ulnar nerve distributions.  Intact motor function.  Demonstrates intact active wrist and finger range of motion.  Thumb opposition intact.  Palmar abduction of thumb intact.  Sensation intact to axillary, median, radial, and ulnar nerve distributions.  Palpable radial pulse.    Physical Exam        Imaging Results (Most Recent)       Procedure Component Value Units Date/Time    XR Wrist 2 View Left [[id number]] Resulted: 04/07/25 1348     Updated: 04/07/25 1348    Narrative:      X-Ray Report:  Study: X-rays ordered, taken in the office, and reviewed today  Indication: Left wrist pain/questionable distal radius fracture follow-up  View: AP/Lateral view(s)  Findings: Tiny avulsion fracture noticed on lateral views previous films   is resolved.  Prior studies available for comparison: yes                   Assessment and Plan     Diagnoses and all orders for this visit:    1. Left wrist pain (Primary)  -     XR Wrist 2 View Left      LEFT WRIST: X-rays obtained and reviewed of the wrist.  Interval healing of distal radius fracture.  Avulsion fracture previously noted on lateral films no longer  visible.  Wean out of brace.     LEFT THUMB: Describes triggering at times.  She wonders if she is developing a trigger finger.  She would like to see Dr. Cano for this.  Again she is apprehensive of steroid injections.  I did discuss with her that this is a more localized steroid effect at the site of injection.  She states she will consider this when she sees him.    LEFT SHOULDER: Scheduled for MRI tomorrow.  Valium 5 mg prescribed.  Advised not to drive while on this.  Given that she is following with Dr. Cano for the thumb, she will review MRI results at that visit as well.  Again, caring for her grandchildren has raised concerns of difficulties in any postoperative recovery.  This will have to be discussed further pending what the MRI results show.  Hold off on any further therapy with left shoulder until after MRI results.  Pending with home exercises/mobility of shoulder.  Assessment & Plan        Follow Up   There are no Patient Instructions on file for this visit.        Patient was given instructions and counseling regarding her condition or for health maintenance advice. Please see specific information pulled into the AVS if appropriate.     Raeann Mosley PA-C   04/07/2025  14:41 EDT    Patient or patient representative verbalized consent for the use of Ambient Listening during the visit with  Raeann Mosley PA-C for chart documentation. 4/7/2025  14:19 EDT    Dictated Utilizing Dragon Dictation. Please note that portions of this note were completed with a voice recognition program. Part of this note may be an electronic transcription/translation of spoken language to printed text using the Dragon Dictation System.

## 2025-04-07 ENCOUNTER — OFFICE VISIT (OUTPATIENT)
Dept: ORTHOPEDIC SURGERY | Facility: CLINIC | Age: 75
End: 2025-04-07
Payer: MEDICARE

## 2025-04-07 VITALS
SYSTOLIC BLOOD PRESSURE: 143 MMHG | DIASTOLIC BLOOD PRESSURE: 77 MMHG | OXYGEN SATURATION: 97 % | HEIGHT: 65 IN | BODY MASS INDEX: 30.66 KG/M2 | HEART RATE: 91 BPM | WEIGHT: 184 LBS

## 2025-04-07 DIAGNOSIS — M25.532 LEFT WRIST PAIN: Primary | ICD-10-CM

## 2025-04-07 PROCEDURE — 99214 OFFICE O/P EST MOD 30 MIN: CPT | Performed by: PHYSICIAN ASSISTANT

## 2025-04-07 PROCEDURE — 3077F SYST BP >= 140 MM HG: CPT | Performed by: PHYSICIAN ASSISTANT

## 2025-04-07 PROCEDURE — 1159F MED LIST DOCD IN RCRD: CPT | Performed by: PHYSICIAN ASSISTANT

## 2025-04-07 PROCEDURE — 1160F RVW MEDS BY RX/DR IN RCRD: CPT | Performed by: PHYSICIAN ASSISTANT

## 2025-04-07 PROCEDURE — 3078F DIAST BP <80 MM HG: CPT | Performed by: PHYSICIAN ASSISTANT

## 2025-04-07 RX ORDER — DIAZEPAM 5 MG/1
TABLET ORAL
Qty: 1 TABLET | Refills: 0 | Status: SHIPPED | OUTPATIENT
Start: 2025-04-07

## 2025-04-09 ENCOUNTER — TELEPHONE (OUTPATIENT)
Dept: PHYSICAL THERAPY | Facility: CLINIC | Age: 75
End: 2025-04-09

## 2025-04-09 NOTE — TELEPHONE ENCOUNTER
Caller: Yuliana Da Silva    Relationship: Self    What was the call regarding: PATIENT IS WANTING TO BE DISCHARGED.

## 2025-04-10 ENCOUNTER — TELEPHONE (OUTPATIENT)
Dept: ORTHOPEDIC SURGERY | Facility: CLINIC | Age: 75
End: 2025-04-10
Payer: MEDICARE

## 2025-04-10 NOTE — TELEPHONE ENCOUNTER
Provider:  CELSO HERNANDEZ PA-C    Caller: SOILA MAN    Relationship to Patient: SELF    Pharmacy: [facility] [phone]    Phone Number: [phone]    Reason for Call:  PATIENT TRIED TO DO LEFT SHOULDER MRI AT [facility] BUT EVEN WITH SEDATION PATIENT WAS NOT ABLE TO GO IN THE MACHINE. PATIENT IS ASKING IF NEW ORDER COULD BE SENT TO [facility] ON [address]. THEY HAVE A LARGER MACHINE. PATIENT WILL ALSO NEED MORE SEDATION RX.    When was the patient last seen: 4/7/25

## 2025-04-11 DIAGNOSIS — M25.512 LEFT SHOULDER PAIN, UNSPECIFIED CHRONICITY: Primary | ICD-10-CM

## 2025-04-11 DIAGNOSIS — S49.92XA INJURY OF LEFT SHOULDER, INITIAL ENCOUNTER: ICD-10-CM

## 2025-04-21 ENCOUNTER — OFFICE VISIT (OUTPATIENT)
Dept: ORTHOPEDIC SURGERY | Facility: CLINIC | Age: 75
End: 2025-04-21
Payer: MEDICARE

## 2025-04-21 VITALS
OXYGEN SATURATION: 99 % | HEIGHT: 65 IN | WEIGHT: 184 LBS | SYSTOLIC BLOOD PRESSURE: 127 MMHG | HEART RATE: 91 BPM | BODY MASS INDEX: 30.66 KG/M2 | DIASTOLIC BLOOD PRESSURE: 81 MMHG

## 2025-04-21 DIAGNOSIS — M65.312 TRIGGER THUMB OF LEFT HAND: Primary | ICD-10-CM

## 2025-04-21 RX ADMIN — TRIAMCINOLONE ACETONIDE 40 MG: 40 INJECTION, SUSPENSION INTRA-ARTICULAR; INTRAMUSCULAR at 14:30

## 2025-04-21 RX ADMIN — LIDOCAINE HYDROCHLORIDE 1 ML: 10 INJECTION, SOLUTION EPIDURAL; INFILTRATION; INTRACAUDAL; PERINEURAL at 14:30

## 2025-04-21 NOTE — PROGRESS NOTES
"Chief Complaint  Follow-up of the Left Wrist     Subjective      Yuliana Da Silva presents to [facility] for follow up of the left wrist.  She notes triggering of the thumb. She has noticed symptoms of locking the last couple of weeks.  She has difficulty with FMC and  strength.      Allergies   Allergen Reactions    Codeine Hives    Levofloxacin Hives, Diarrhea and Unknown - High Severity    Sulfamethoxazole-Trimethoprim Diarrhea and Unknown - High Severity    Tetracycline Hives and Unknown - High Severity    Tetracyclines & Related GI Intolerance    Sulfa Antibiotics GI Intolerance        Social History     Socioeconomic History    Marital status:    Tobacco Use    Smoking status: Never    Smokeless tobacco: Never    Tobacco comments:     Grew up exposed to second hand smoke   Vaping Use    Vaping status: Never Used   Substance and Sexual Activity    Alcohol use: Never    Drug use: Never    Sexual activity: Defer        I reviewed the patient's chief complaint, history of present illness, review of systems, past medical history, surgical history, family history, social history, medications, and allergy list.     Review of Systems     Constitutional: Denies fevers, chills, weight loss  Cardiovascular: Denies chest pain, shortness of breath  Skin: Denies rashes, acute skin changes  Neurologic: Denies headache, loss of consciousness      Vital Signs:   /81   Pulse 91   Ht 165.1 cm (65\")   Wt 83.5 kg (184 lb)   SpO2 99%   BMI 30.62 kg/m²          Physical Exam  General: Alert. No acute distress    Ortho Exam        LEFT HAND Negative Compression testing/ Negative Tinels. NegativeFinkelsteins. Negative Carrillo's testing. Negative CMC grind testing. Negative Phalens. Full ROM of the hand, fingers, elbow and wrist. Positive Triggering of the digit. Sensation grossly intact to light touch, median, radial and ulnar nerve. Positive AIN, PIN and ulnar nerve motor " function intact. Axillary nerve intact. Positive pulses.        Small Joint Left Thumb Trigger Finger  Consent given by: patient  Site marked: site marked  Timeout: Immediately prior to procedure a time out was called to verify the correct patient, procedure, equipment, support staff and site/side marked as required   Procedure Details  Location: thumb - Thumb joint: Left Thumb Trigger Finger.  Preparation: Patient was prepped and draped in the usual sterile fashion  Needle gauge: 23G.  Medications administered: 1 mL lidocaine PF 1% 1 %; 40 mg triamcinolone acetonide 40 MG/ML  Patient tolerance: patient tolerated the procedure well with no immediate complications        This injection documentation was Scribed for Derrick Cano MD by ARACELI Borja.  04/21/25   15:37 EDT      Imaging Results (Most Recent)       None             Result Review :         XR Wrist 2 View Left  Result Date: 4/7/2025  Narrative: X-Ray Report: Study: X-rays ordered, taken in the office, and reviewed today Indication: Left wrist pain/questionable distal radius fracture follow-up View: AP/Lateral view(s) Findings: Tiny avulsion fracture noticed on lateral views previous films is resolved. Prior studies available for comparison: yes              Assessment and Plan     Diagnoses and all orders for this visit:    1. Trigger thumb of left hand (Primary)    Other orders  -     Small Joint Left Thumb Trigger Finger        Discussed the treatment plan with the patient.     Discussed the risks and benefits of conservative measures.  The patient expressed understanding and wished to proceed with a left trigger finger steroid injection.  She tolerated the injection well.      Discussed with the patient that due to the steroid injection given today in the office they may see an increase in blood sugar for a few days. Advised patient to monitor sugar after receiving the injection.     Discussed possibility of a reaction from the injection.   Discussed the possibility that the injection may not completely improve or remove the pain.  Discussed the risk of infection.        Call or return if worsening symptoms.    Follow Up     PRN      Patient was given instructions and counseling regarding her condition or for health maintenance advice. Please see specific information pulled into the AVS if appropriate.     Scribed for Derrick Cano MD by Orin Parham MA.  04/21/25   14:54 EDT    I have personally performed the services described in this document as scribed by the above individual and it is both accurate and complete. Derrick Cano MD 04/22/25

## 2025-04-22 RX ORDER — LIDOCAINE HYDROCHLORIDE 10 MG/ML
1 INJECTION, SOLUTION EPIDURAL; INFILTRATION; INTRACAUDAL; PERINEURAL
Status: COMPLETED | OUTPATIENT
Start: 2025-04-21 | End: 2025-04-21

## 2025-04-22 RX ORDER — TRIAMCINOLONE ACETONIDE 40 MG/ML
40 INJECTION, SUSPENSION INTRA-ARTICULAR; INTRAMUSCULAR
Status: COMPLETED | OUTPATIENT
Start: 2025-04-21 | End: 2025-04-21

## 2025-04-24 ENCOUNTER — TRANSCRIBE ORDERS (OUTPATIENT)
Dept: ADMINISTRATIVE | Facility: HOSPITAL | Age: 75
End: 2025-04-24
Payer: MEDICARE

## 2025-04-24 DIAGNOSIS — Z12.31 VISIT FOR SCREENING MAMMOGRAM: Primary | ICD-10-CM

## 2025-05-22 RX ORDER — AMLODIPINE BESYLATE 5 MG/1
5 TABLET ORAL DAILY
Qty: 90 TABLET | Refills: 3 | Status: SHIPPED | OUTPATIENT
Start: 2025-05-22

## 2025-06-10 ENCOUNTER — OFFICE VISIT (OUTPATIENT)
Dept: INTERNAL MEDICINE | Facility: CLINIC | Age: 75
End: 2025-06-10
Payer: MEDICARE

## 2025-06-10 VITALS
HEIGHT: 65 IN | SYSTOLIC BLOOD PRESSURE: 110 MMHG | BODY MASS INDEX: 29.66 KG/M2 | OXYGEN SATURATION: 100 % | RESPIRATION RATE: 12 BRPM | HEART RATE: 84 BPM | TEMPERATURE: 96.8 F | DIASTOLIC BLOOD PRESSURE: 70 MMHG | WEIGHT: 178 LBS

## 2025-06-10 DIAGNOSIS — D64.9 ANEMIA, UNSPECIFIED TYPE: ICD-10-CM

## 2025-06-10 DIAGNOSIS — R73.03 PRE-DIABETES: ICD-10-CM

## 2025-06-10 DIAGNOSIS — I10 ESSENTIAL HYPERTENSION: Primary | ICD-10-CM

## 2025-06-10 DIAGNOSIS — R25.2 FOOT CRAMPS: ICD-10-CM

## 2025-06-10 DIAGNOSIS — F43.9 STRESS AT HOME: ICD-10-CM

## 2025-06-10 DIAGNOSIS — E78.2 MIXED HYPERLIPIDEMIA: ICD-10-CM

## 2025-06-10 LAB
ALBUMIN SERPL-MCNC: 4.8 G/DL (ref 3.5–5.2)
ALBUMIN/GLOB SERPL: 1.5 G/DL
ALP SERPL-CCNC: 76 U/L (ref 39–117)
ALT SERPL W P-5'-P-CCNC: 24 U/L (ref 1–33)
ANION GAP SERPL CALCULATED.3IONS-SCNC: 15 MMOL/L (ref 5–15)
AST SERPL-CCNC: 32 U/L (ref 1–32)
BASOPHILS # BLD AUTO: 0.06 10*3/MM3 (ref 0–0.2)
BASOPHILS NFR BLD AUTO: 1.1 % (ref 0–1.5)
BILIRUB SERPL-MCNC: 0.3 MG/DL (ref 0–1.2)
BUN SERPL-MCNC: 18 MG/DL (ref 8–23)
BUN/CREAT SERPL: 19.6 (ref 7–25)
CALCIUM SPEC-SCNC: 9.9 MG/DL (ref 8.6–10.5)
CHLORIDE SERPL-SCNC: 102 MMOL/L (ref 98–107)
CHOLEST SERPL-MCNC: 183 MG/DL (ref 0–200)
CO2 SERPL-SCNC: 26 MMOL/L (ref 22–29)
CREAT SERPL-MCNC: 0.92 MG/DL (ref 0.57–1)
DEPRECATED RDW RBC AUTO: 42.4 FL (ref 37–54)
EGFRCR SERPLBLD CKD-EPI 2021: 65.1 ML/MIN/1.73
EOSINOPHIL # BLD AUTO: 0.12 10*3/MM3 (ref 0–0.4)
EOSINOPHIL NFR BLD AUTO: 2.1 % (ref 0.3–6.2)
ERYTHROCYTE [DISTWIDTH] IN BLOOD BY AUTOMATED COUNT: 13.1 % (ref 12.3–15.4)
GLOBULIN UR ELPH-MCNC: 3.3 GM/DL
GLUCOSE SERPL-MCNC: 114 MG/DL (ref 65–99)
HBA1C MFR BLD: 6.1 % (ref 4.8–5.6)
HCT VFR BLD AUTO: 41.4 % (ref 34–46.6)
HDLC SERPL-MCNC: 96 MG/DL (ref 40–60)
HGB BLD-MCNC: 13.5 G/DL (ref 12–15.9)
IMM GRANULOCYTES # BLD AUTO: 0.01 10*3/MM3 (ref 0–0.05)
IMM GRANULOCYTES NFR BLD AUTO: 0.2 % (ref 0–0.5)
LDLC SERPL CALC-MCNC: 64 MG/DL (ref 0–100)
LDLC/HDLC SERPL: 0.62 {RATIO}
LYMPHOCYTES # BLD AUTO: 2.91 10*3/MM3 (ref 0.7–3.1)
LYMPHOCYTES NFR BLD AUTO: 51.1 % (ref 19.6–45.3)
MAGNESIUM SERPL-MCNC: 2.1 MG/DL (ref 1.6–2.4)
MCH RBC QN AUTO: 29.3 PG (ref 26.6–33)
MCHC RBC AUTO-ENTMCNC: 32.6 G/DL (ref 31.5–35.7)
MCV RBC AUTO: 90 FL (ref 79–97)
MONOCYTES # BLD AUTO: 0.39 10*3/MM3 (ref 0.1–0.9)
MONOCYTES NFR BLD AUTO: 6.9 % (ref 5–12)
NEUTROPHILS NFR BLD AUTO: 2.2 10*3/MM3 (ref 1.7–7)
NEUTROPHILS NFR BLD AUTO: 38.6 % (ref 42.7–76)
NRBC BLD AUTO-RTO: 0 /100 WBC (ref 0–0.2)
PLATELET # BLD AUTO: 263 10*3/MM3 (ref 140–450)
PMV BLD AUTO: 10.5 FL (ref 6–12)
POTASSIUM SERPL-SCNC: 3.7 MMOL/L (ref 3.5–5.2)
PROT SERPL-MCNC: 8.1 G/DL (ref 6–8.5)
RBC # BLD AUTO: 4.6 10*6/MM3 (ref 3.77–5.28)
SODIUM SERPL-SCNC: 143 MMOL/L (ref 136–145)
TRIGL SERPL-MCNC: 139 MG/DL (ref 0–150)
VLDLC SERPL-MCNC: 23 MG/DL (ref 5–40)
WBC NRBC COR # BLD AUTO: 5.69 10*3/MM3 (ref 3.4–10.8)

## 2025-06-10 PROCEDURE — 80053 COMPREHEN METABOLIC PANEL: CPT | Performed by: NURSE PRACTITIONER

## 2025-06-10 PROCEDURE — 83036 HEMOGLOBIN GLYCOSYLATED A1C: CPT | Performed by: NURSE PRACTITIONER

## 2025-06-10 PROCEDURE — 85025 COMPLETE CBC W/AUTO DIFF WBC: CPT | Performed by: NURSE PRACTITIONER

## 2025-06-10 PROCEDURE — 80061 LIPID PANEL: CPT | Performed by: NURSE PRACTITIONER

## 2025-06-10 PROCEDURE — 83735 ASSAY OF MAGNESIUM: CPT | Performed by: NURSE PRACTITIONER

## 2025-06-10 RX ORDER — METFORMIN HYDROCHLORIDE 500 MG/1
500 TABLET, EXTENDED RELEASE ORAL
Qty: 90 TABLET | Refills: 1 | Status: SHIPPED | OUTPATIENT
Start: 2025-06-10

## 2025-06-11 NOTE — PROGRESS NOTES
Chief Complaint  Spasms (Muscle spasms in both legs but right leg is worse)    Subjective        Yuliana Da Silva presents to [facility] for follow-up for chronic conditions and concerns regarding spasms in her feet.    Patient has not had any regular follow-up since November 2024 with our office.  She has recently transition providers, I have seen the patient once before today, which was in October 2024 for some acute symptoms.  She does follow-up with cardiology on a regular basis as well as hematology.  She had recent follow-up with cardiology in April.  No significant changes were recommended at the time based on her recollection.  She does have prediabetes and is interested in restarting metformin, but at a lower dose.  Her A1c has not been checked in a while.  Blood pressure has been doing well, she takes her medications with good compliance.  She has not had any lab work done in several months.  She is complaining of cramps in bilateral feet, she describes these as charley horses.  The foot will tense up, her toes will be added awkward directions, and it takes some time for these to work out, sometimes she has to stand or reposition herself.  She feels like this happens frequently, especially at night.  She does report hydrating decently, she has been trying to drink some Powerade, sugar-free, to try to replenish electrolytes as she was concerned that might be a problem.  She has been dealing with a significant amount of stress.  She had traumatic death of a great grandchild earlier this year, since then, she has been guardian of her 3 other great-grandchildren, and has her granddaughter, which is the parent living with her and her  in their home.  This is created a lot of stress for her, a lot of worry, and of course with the tragic loss, a lot of sadness.    Objective   Vital Signs:   /70 (BP Location: Left arm, Patient Position: Sitting, Cuff Size: Adult)    "Pulse 84   Temp 96.8 °F (36 °C) (Temporal)   Resp 12   Ht 165.1 cm (65\")   Wt 80.7 kg (178 lb)   SpO2 100%   BMI 29.62 kg/m²     Physical Exam  Vitals and nursing note reviewed.   Constitutional:       Appearance: Normal appearance.   HENT:      Head: Normocephalic and atraumatic.      Right Ear: External ear normal.      Left Ear: External ear normal.   Cardiovascular:      Rate and Rhythm: Normal rate and regular rhythm.   Pulmonary:      Effort: Pulmonary effort is normal.      Breath sounds: Normal breath sounds.   Neurological:      Mental Status: She is alert.   Psychiatric:         Mood and Affect: Mood normal.         Thought Content: Thought content normal.        Result Review :  {The following data was reviewed by ABI Elizondo on 06/11/25                Diagnoses and all orders for this visit:    1. Essential hypertension (Primary)  -     CBC & Differential  -     Comprehensive Metabolic Panel    2. Pre-diabetes  -     metFORMIN ER (GLUCOPHAGE-XR) 500 MG 24 hr tablet; Take 1 tablet by mouth Daily With Breakfast.  Dispense: 90 tablet; Refill: 1  -     CBC & Differential  -     Comprehensive Metabolic Panel  -     Hemoglobin A1c    3. Anemia, unspecified type  -     CBC & Differential    4. Foot cramps  -     CBC & Differential  -     Comprehensive Metabolic Panel  -     Magnesium    5. Mixed hyperlipidemia  -     Lipid Panel    6. Stress at home    We will check labs on patient today to monitor safety and efficacy of medications, as well as workup her complaints.  She has been trying to increase her water intake as well as electrolyte supplementation with Powerade.  Which I did recommend.  Avoid any excess glucose.  Patient verbalized understanding.  She was interested in restarting metformin, we will go for an extended release 500 mg once a day dose to see if she tolerates this better versus the immediate release twice daily.  We will follow-up accordingly based on lab results.      Follow Up "   No follow-ups on file.  Patient was given instructions and counseling regarding her condition or for health maintenance advice. Please see specific information pulled into the AVS if appropriate.     Justen De Jesus, APRN  6/11/2025  This note was electronically signed.

## 2025-06-23 ENCOUNTER — HOSPITAL ENCOUNTER (OUTPATIENT)
Dept: MAMMOGRAPHY | Facility: HOSPITAL | Age: 75
Discharge: HOME | End: 2025-06-23
Admitting: NURSE PRACTITIONER
Payer: MEDICARE

## 2025-06-23 DIAGNOSIS — Z12.31 VISIT FOR SCREENING MAMMOGRAM: ICD-10-CM

## 2025-06-23 PROCEDURE — 77063 BREAST TOMOSYNTHESIS BI: CPT

## 2025-06-23 PROCEDURE — 77067 SCR MAMMO BI INCL CAD: CPT

## 2025-07-09 ENCOUNTER — DOCUMENTATION (OUTPATIENT)
Dept: PHYSICAL THERAPY | Facility: CLINIC | Age: 75
End: 2025-07-09
Payer: MEDICARE

## 2025-07-09 NOTE — PROGRESS NOTES
Discharge Summary  Discharge Summary from Occupational Therapy Report  [facility]  OT: [address] [place] [address]    Dates  OT visit: 3/12/25 - 3/31/25  Number of Visits: 5     Discharge Status of Patient: See treatment Note dated 3/31/25    Goals: discontinued    Discharge Plan: Continue with current home exercise program as instructed    Comments Pt did not return to therapy.    Date of Discharge 3/31/25        Alan Toro OT  Occupational Therapist  [place] License:[id number]

## 2025-08-25 ENCOUNTER — OFFICE VISIT (OUTPATIENT)
Dept: ORTHOPEDIC SURGERY | Facility: CLINIC | Age: 75
End: 2025-08-25
Payer: MEDICARE

## 2025-08-25 VITALS
HEART RATE: 76 BPM | BODY MASS INDEX: 29.64 KG/M2 | OXYGEN SATURATION: 100 % | HEIGHT: 65 IN | SYSTOLIC BLOOD PRESSURE: 113 MMHG | WEIGHT: 177.91 LBS | DIASTOLIC BLOOD PRESSURE: 57 MMHG

## 2025-08-25 DIAGNOSIS — M18.12 ARTHRITIS OF CARPOMETACARPAL (CMC) JOINT OF LEFT THUMB: Primary | ICD-10-CM

## 2025-08-25 PROCEDURE — 3078F DIAST BP <80 MM HG: CPT | Performed by: PHYSICIAN ASSISTANT

## 2025-08-25 PROCEDURE — 1160F RVW MEDS BY RX/DR IN RCRD: CPT | Performed by: PHYSICIAN ASSISTANT

## 2025-08-25 PROCEDURE — 99213 OFFICE O/P EST LOW 20 MIN: CPT | Performed by: PHYSICIAN ASSISTANT

## 2025-08-25 PROCEDURE — 1159F MED LIST DOCD IN RCRD: CPT | Performed by: PHYSICIAN ASSISTANT

## 2025-08-25 PROCEDURE — 3074F SYST BP LT 130 MM HG: CPT | Performed by: PHYSICIAN ASSISTANT

## (undated) DEVICE — SOLIDIFIER LIQLOC PLS 1500CC BT

## (undated) DEVICE — SINGLE-USE BIOPSY FORCEPS: Brand: RADIAL JAW 4

## (undated) DEVICE — Device: Brand: DEFENDO AIR/WATER/SUCTION AND BIOPSY VALVE

## (undated) DEVICE — Device

## (undated) DEVICE — CONN JET HYDRA H20 AUXILIARY DISP

## (undated) DEVICE — SOL IRRG H2O PL/BG 1000ML STRL

## (undated) DEVICE — LINER SURG CANSTR SXN S/RIGD 1500CC